# Patient Record
Sex: FEMALE | Race: WHITE | NOT HISPANIC OR LATINO | Employment: FULL TIME | URBAN - METROPOLITAN AREA
[De-identification: names, ages, dates, MRNs, and addresses within clinical notes are randomized per-mention and may not be internally consistent; named-entity substitution may affect disease eponyms.]

---

## 2017-01-02 ENCOUNTER — OFFICE VISIT (OUTPATIENT)
Dept: URGENT CARE | Facility: CLINIC | Age: 32
End: 2017-01-02
Payer: COMMERCIAL

## 2017-01-02 VITALS
BODY MASS INDEX: 24.18 KG/M2 | TEMPERATURE: 97 F | DIASTOLIC BLOOD PRESSURE: 80 MMHG | HEART RATE: 82 BPM | WEIGHT: 128 LBS | OXYGEN SATURATION: 100 % | SYSTOLIC BLOOD PRESSURE: 110 MMHG

## 2017-01-02 DIAGNOSIS — T36.95XA ANTIBIOTIC-INDUCED YEAST INFECTION: ICD-10-CM

## 2017-01-02 DIAGNOSIS — N39.0 URINARY TRACT INFECTION, SITE UNSPECIFIED: Primary | ICD-10-CM

## 2017-01-02 DIAGNOSIS — B37.9 ANTIBIOTIC-INDUCED YEAST INFECTION: ICD-10-CM

## 2017-01-02 DIAGNOSIS — R30.0 DYSURIA: ICD-10-CM

## 2017-01-02 LAB
BILIRUB SERPL-MCNC: NEGATIVE MG/DL
BLOOD URINE, POC: NEGATIVE
COLOR, POC UA: YELLOW
GLUCOSE UR QL STRIP: NORMAL
KETONES UR QL STRIP: NEGATIVE
LEUKOCYTE ESTERASE URINE, POC: ABNORMAL
NITRITE, POC UA: NEGATIVE
PH, POC UA: 7
PROTEIN, POC: ABNORMAL
SPECIFIC GRAVITY, POC UA: 1.01
UROBILINOGEN, POC UA: NORMAL

## 2017-01-02 PROCEDURE — 1159F MED LIST DOCD IN RCRD: CPT | Mod: S$GLB,,, | Performed by: PHYSICIAN ASSISTANT

## 2017-01-02 PROCEDURE — 81002 URINALYSIS NONAUTO W/O SCOPE: CPT | Mod: S$GLB,,, | Performed by: PHYSICIAN ASSISTANT

## 2017-01-02 PROCEDURE — 99999 PR PBB SHADOW E&M-EST. PATIENT-LVL III: CPT | Mod: PBBFAC,,, | Performed by: PHYSICIAN ASSISTANT

## 2017-01-02 PROCEDURE — 99213 OFFICE O/P EST LOW 20 MIN: CPT | Mod: 25,S$GLB,, | Performed by: PHYSICIAN ASSISTANT

## 2017-01-02 RX ORDER — FLUCONAZOLE 150 MG/1
150 TABLET ORAL ONCE
Qty: 2 TABLET | Refills: 0 | Status: SHIPPED | OUTPATIENT
Start: 2017-01-02 | End: 2017-01-02

## 2017-01-02 RX ORDER — PHENAZOPYRIDINE HYDROCHLORIDE 200 MG/1
200 TABLET, FILM COATED ORAL 3 TIMES DAILY PRN
Qty: 6 TABLET | Refills: 0 | Status: SHIPPED | OUTPATIENT
Start: 2017-01-02 | End: 2017-01-31

## 2017-01-02 RX ORDER — NITROFURANTOIN 25; 75 MG/1; MG/1
100 CAPSULE ORAL 2 TIMES DAILY
Qty: 10 CAPSULE | Refills: 0 | Status: SHIPPED | OUTPATIENT
Start: 2017-01-02 | End: 2017-01-07

## 2017-01-02 NOTE — PATIENT INSTRUCTIONS
"  Bladder Infection, Female (Adult)    Urine is normally free from bacteria. But bacteria can get into the urinary tract from the skin around the rectum, or it can travel in the blood from elsewhere in the body. Once it is in your urinary tract, it can cause infection in the urethra (urethritis), the bladder (cystitis), or the kidneys (pyelonephritis).  The most common place for an infection is in the bladder. This is called a bladder infection. This is one of the most common infections in women. Most bladder infections are easily treated. They are not serious unless the infection spreads up to the kidney.  The phrases "bladder infection", "UTI," and "cystitis," are often used to describe the same thing, but they are not always the same. Cystitis is an inflammation of the bladder. The most common cause of cystitis is an infection.  Symptoms  The infection causes inflammation in the urethra and bladder, which causes many of the symptoms. The most common symptoms of a bladder infection are:  · Pain or burning when urinating  · Having to urinate more often than usual  · Urgent need to urinate  · Only a small amount of urine comes out  · Blood in urine  · Abdominal discomfort, usually in the lower abdomen, above the pubic bone  · Cloudy, strong, or bad smelling urine  · Urinary retention, being unable to urinate  · Unable to hold urine in (urinary incontinence)  · Fever  · Loss of appetite  · Confusion (in older adults)  Causes  Bladder infections are not contagious. You can't get one from someone else, from a toilet seat, or from sharing a bath.  The most common cause of bladder infections is bacteria from the bowels. The bacteria get onto the skin around the opening of the urethra. From there, it can get into the urine and travel up to the bladder, causing inflammation and infection. This usually happens because of:  · Wiping improperly after urinating--always wipe from front to back.  · Bowel " incontinence  · Pregnancy  · Procedures such as having a catheter inserted  · Older age  · Not emptying your bladder (stagnated urine gives bacteria a chance to grow)  · Dehydration  · Constipation  · Sex  · Use of a diaphragm for birth control   Treatment  Bladder infections are diagnosed by a urine test. They are treated with antibiotics and usually clear up quickly without complications. Treatment helps prevent a more serious kidney infection.  Medicines  Medicines can help in the treatment of a bladder infection:  · Take antibiotics until they are used up, even if you feel better. It is important to finish them to make sure the infection has cleared.  · You can use acetaminophen or ibuprofen for pain, fever, or discomfort, unless another medicine was prescribed. You can also alternate them, or use both together. They work differently and are a different class of medicines, so taking them together is not an overdose. If you have chronic liver or kidney disease, talk with your healthcare provider before using these medicines. Also talk with your provider if you've ever had a stomach ulcer or gastrointestinal bleeding, or are taking blood-thinner medicines.  · If you are given phenazopydridine to reduce burning with urination, it will cause your urine to become a bright orange color. This can stain clothing.  Care and prevention  These self-care steps can help prevent future infections:  · Drink plenty of fluids to prevent dehydration and flush out of the bladder. Do this unless you must restrict fluids for other health reasons, or your doctor told you not to.  · Proper cleaning after going to the bathroom is important. Wipe from front to back after using the toilet to prevent the spread of bacteria.  · Urinate more often. Don't try to hold urine in for a long time.  · Wear loose-fitting clothes and cotton underwear. Avoid tight-fitting pans.  · Improve your diet and prevent constipation. Eat more fresh fruit and  vegetables, and fiber, and less junk and fatty foods.  · Avoid sex until your symptoms are gone.  · Avoid caffeine, alcohol, and spicy foods. These can irritate the bladder.  · Urinate right after intercourse to flush out the bladder.  · If you use birth control pills and have frequent bladder infections, discuss it with your doctor.  Follow-up care  Call your healthcare provider if all symptoms are not gone after 3 days of treatment. This is especially important if you have repeat infections.  If a culture was done, you will be told if your treatment needs to be changed. If directed, you can call to find out the results.  If X-rays were done, you will be told if the results will affect your treatment.  Call 911  Call emergency services if any of the following occur:  · Trouble breathing  · Difficulty arousing or confusion  · Fainting or loss of consciousness  · Rapid heart rate  When to seek medical advice  Call your healthcare provider right away if any of these occur:  · Fever of 100.4ºF (38.0ºC) or higher, or as directed  · Symptoms are not better by the third day of treatment  · Back or belly (abdominal) pain that gets worse  · Repeated vomiting, or unable to keep medicine down  · Weakness or dizziness  · Vaginal discharge  · Pain, redness, or swelling in the outer vaginal area (labia)  © 5377-0825 The "[employer]. [address] [place] [address], [place] [id number]. All rights reserved. This information is not intended as a substitute for professional medical care. Always follow your healthcare professional's instructions.    Increase fluid intake.  Drink at least 64 ounces of water each day.  Do not hold urine in the bladder.  Empty bladder with first urge to urinate.  Good perineal hygiene.  Avoid wiping from back to front.  Use back up method of birth control while on antibiotic therapy and at least 1 week after completion.  Complete all antibiotics to help prevent recurrence of infection and resistant  bacteria.  Follow up with PCP if no improvement in 3-5 days.

## 2017-01-02 NOTE — PROGRESS NOTES
Subjective:       Patient ID: Julienne Rosa is a 31 y.o. female.    Chief Complaint: Urinary Retention    Urinary Tract Infection    This is a new problem. The current episode started yesterday. The problem has been gradually worsening. There has been no fever. There is no history of pyelonephritis. Associated symptoms include flank pain (mild, R low back), frequency and urgency. Pertinent negatives include no chills, hematuria, nausea, vomiting, bubble bath use or rash. She has tried increased fluids (cranberry juice, ibuprofen) for the symptoms. There is no history of diabetes mellitus, kidney stones or recurrent UTIs. 5yrs since last UTI     Review of Systems   Constitutional: Negative for chills, diaphoresis and fever.   Gastrointestinal: Negative for nausea and vomiting.   Genitourinary: Positive for dysuria, flank pain (mild, R low back), frequency, pelvic pain and urgency. Negative for hematuria and vaginal discharge.   Musculoskeletal: Negative for back pain.   Skin: Negative for rash.   Neurological: Negative for dizziness and headaches.       Objective:      Physical Exam   Constitutional: Vital signs are normal. She appears well-developed and well-nourished.   HENT:   Head: Normocephalic and atraumatic.   Cardiovascular: Normal rate, regular rhythm and normal heart sounds.    Pulmonary/Chest: Effort normal and breath sounds normal. She has no wheezes. She has no rhonchi. She has no rales.   Abdominal: Soft. Bowel sounds are normal. There is no tenderness. There is no CVA tenderness.   Skin: Skin is warm and dry.   Psychiatric: She has a normal mood and affect. Her speech is normal and behavior is normal. Thought content normal.       Assessment:       1. Urinary tract infection, site unspecified    2. Dysuria    3. Antibiotic-induced yeast infection        Plan:   Urinary tract infection, site unspecified  -     POCT urine dipstick without microscope    Dysuria    Antibiotic-induced yeast  infection      Rx: NITROFURANTOIN MONOHYD/M-CRYST 100 mg Oral BID   PHENAZOPYRIDINE  mg Oral TID PRN   FLUCONAZOLE 150 mg Oral Once, Repeat in 3 days if symptoms still persist.     Patient handout on UTI give.  Printed and reviewed AVS.    Patient requests prescription for yeast infection and she frequently gets this after UTI.     Patient instruction given in office:  Increase fluid intake.  Drink at least 64 ounces of water each day.  Do not hold urine in the bladder.  Empty bladder with first urge to urinate.  Good perineal hygiene.  Avoid wiping from back to front.  Use back up method of birth control while on antibiotic therapy and at least 1 week after completion.  Complete all antibiotics to help prevent recurrence of infection and resistant bacteria.  Follow up with PCP if no improvement in 3-5 days.

## 2017-01-02 NOTE — MR AVS SNAPSHOT
[facility] - Urgent [facility]  [address] [place] [address]  Phone: [phone]  Fax: [phone]                  Julienne Rosa   2017 9:00 AM   Office Visit    Description:  Female : 1985   Provider:  Lisa Hernández PA-C   Department:  [facility] - Urgent Care           Reason for Visit     Urinary Retention           Diagnoses this Visit        Comments    Urinary tract infection, site unspecified    -  Primary            To Do List           Goals (5 Years of Data)     None      Follow-Up and Disposition     Return if symptoms worsen or fail to improve.      [facility]     [facility] -  Assistance  Registered nurses in the [facility] provide clinical advisement, health education, appointment booking, and other advisory services.  Call for this free service at [phone].             Medications           Message regarding Medications     Verify the changes and/or additions to your medication regime listed below are the same as discussed with your clinician today.  If any of these changes or additions are incorrect, please notify your healthcare provider.             Verify that the below list of medications is an accurate representation of the medications you are currently taking.  If none reported, the list may be blank. If incorrect, please contact your healthcare provider. Carry this list with you in case of emergency.           Current Medications     escitalopram oxalate (LEXAPRO) 10 MG tablet Take 5 mg by mouth once daily.     fluticasone (FLONASE) 50 mcg/actuation nasal spray 1 spray by Each Nare route once daily.    norethindrone-ethinyl estradiol (JUNEL 1/20, 21,) 1-20 mg-mcg per tablet Take 1 tablet by mouth once daily.    PRENATAL VIT/IRON FUMARATE/FA (RIGHT STEP PRENATAL VITAMINS ORAL) Take by mouth.    triamcinolone acetonide 0.1% (KENALOG) 0.1 % cream Apply topically 2 (two) times daily.           Clinical Reference  "Information           Vital Signs - Last Recorded  Most recent update: 1/2/2017  8:54 AM by Rene Valenzuela MA    BP Pulse Temp    110/80 (BP Location: Right arm, Patient Position: Sitting, BP Method: Manual) 82 97.1 °F (36.2 °C) (Tympanic)    Wt SpO2 BMI    58 kg (127 lb 15.6 oz) 100% 24.18 kg/m2      Blood Pressure          Most Recent Value    BP  110/80      Allergies as of 1/2/2017     Ceclor [Cefaclor]    Augmentin [Amoxicillin-pot Clavulanate]    Sulfa (Sulfonamide Antibiotics)      Immunizations Administered on Date of Encounter - 1/2/2017     None      Orders Placed During Today's Visit      Normal Orders This Visit    POCT urine dipstick without microscope       Instructions      Bladder Infection, Female (Adult)    Urine is normally free from bacteria. But bacteria can get into the urinary tract from the skin around the rectum, or it can travel in the blood from elsewhere in the body. Once it is in your urinary tract, it can cause infection in the urethra (urethritis), the bladder (cystitis), or the kidneys (pyelonephritis).  The most common place for an infection is in the bladder. This is called a bladder infection. This is one of the most common infections in women. Most bladder infections are easily treated. They are not serious unless the infection spreads up to the kidney.  The phrases "bladder infection", "UTI," and "cystitis," are often used to describe the same thing, but they are not always the same. Cystitis is an inflammation of the bladder. The most common cause of cystitis is an infection.  Symptoms  The infection causes inflammation in the urethra and bladder, which causes many of the symptoms. The most common symptoms of a bladder infection are:  · Pain or burning when urinating  · Having to urinate more often than usual  · Urgent need to urinate  · Only a small amount of urine comes out  · Blood in urine  · Abdominal discomfort, usually in the lower abdomen, above the pubic " bone  · Cloudy, strong, or bad smelling urine  · Urinary retention, being unable to urinate  · Unable to hold urine in (urinary incontinence)  · Fever  · Loss of appetite  · Confusion (in older adults)  Causes  Bladder infections are not contagious. You can't get one from someone else, from a toilet seat, or from sharing a bath.  The most common cause of bladder infections is bacteria from the bowels. The bacteria get onto the skin around the opening of the urethra. From there, it can get into the urine and travel up to the bladder, causing inflammation and infection. This usually happens because of:  · Wiping improperly after urinating--always wipe from front to back.  · Bowel incontinence  · Pregnancy  · Procedures such as having a catheter inserted  · Older age  · Not emptying your bladder (stagnated urine gives bacteria a chance to grow)  · Dehydration  · Constipation  · Sex  · Use of a diaphragm for birth control   Treatment  Bladder infections are diagnosed by a urine test. They are treated with antibiotics and usually clear up quickly without complications. Treatment helps prevent a more serious kidney infection.  Medicines  Medicines can help in the treatment of a bladder infection:  · Take antibiotics until they are used up, even if you feel better. It is important to finish them to make sure the infection has cleared.  · You can use acetaminophen or ibuprofen for pain, fever, or discomfort, unless another medicine was prescribed. You can also alternate them, or use both together. They work differently and are a different class of medicines, so taking them together is not an overdose. If you have chronic liver or kidney disease, talk with your healthcare provider before using these medicines. Also talk with your provider if you've ever had a stomach ulcer or gastrointestinal bleeding, or are taking blood-thinner medicines.  · If you are given phenazopydridine to reduce burning with urination, it will cause  your urine to become a bright orange color. This can stain clothing.  Care and prevention  These self-care steps can help prevent future infections:  · Drink plenty of fluids to prevent dehydration and flush out of the bladder. Do this unless you must restrict fluids for other health reasons, or your doctor told you not to.  · Proper cleaning after going to the bathroom is important. Wipe from front to back after using the toilet to prevent the spread of bacteria.  · Urinate more often. Don't try to hold urine in for a long time.  · Wear loose-fitting clothes and cotton underwear. Avoid tight-fitting pans.  · Improve your diet and prevent constipation. Eat more fresh fruit and vegetables, and fiber, and less junk and fatty foods.  · Avoid sex until your symptoms are gone.  · Avoid caffeine, alcohol, and spicy foods. These can irritate the bladder.  · Urinate right after intercourse to flush out the bladder.  · If you use birth control pills and have frequent bladder infections, discuss it with your doctor.  Follow-up care  Call your healthcare provider if all symptoms are not gone after 3 days of treatment. This is especially important if you have repeat infections.  If a culture was done, you will be told if your treatment needs to be changed. If directed, you can call to find out the results.  If X-rays were done, you will be told if the results will affect your treatment.  Call 911  Call emergency services if any of the following occur:  · Trouble breathing  · Difficulty arousing or confusion  · Fainting or loss of consciousness  · Rapid heart rate  When to seek medical advice  Call your healthcare provider right away if any of these occur:  · Fever of 100.4ºF (38.0ºC) or higher, or as directed  · Symptoms are not better by the third day of treatment  · Back or belly (abdominal) pain that gets worse  · Repeated vomiting, or unable to keep medicine down  · Weakness or dizziness  · Vaginal discharge  · Pain,  redness, or swelling in the outer vaginal area (labia)  © 1775-4251 [employer]. [address] [place] [address], [place] [address]. All rights reserved. This information is not intended as a substitute for professional medical care. Always follow your healthcare professional's instructions.    Increase fluid intake.  Drink at least 64 ounces of water each day.  Do not hold urine in the bladder.  Empty bladder with first urge to urinate.  Good perineal hygiene.  Avoid wiping from back to front.  Use back up method of birth control while on antibiotic therapy and at least 1 week after completion.  Complete all antibiotics to help prevent recurrence of infection and resistant bacteria.  Follow up with PCP if no improvement in 3-5 days.

## 2017-01-10 ENCOUNTER — PATIENT MESSAGE (OUTPATIENT)
Dept: URGENT CARE | Facility: CLINIC | Age: 32
End: 2017-01-10

## 2017-01-10 ENCOUNTER — PATIENT MESSAGE (OUTPATIENT)
Dept: OBSTETRICS AND GYNECOLOGY | Facility: CLINIC | Age: 32
End: 2017-01-10

## 2017-01-10 DIAGNOSIS — R30.0 DYSURIA: Primary | ICD-10-CM

## 2017-01-11 ENCOUNTER — LAB VISIT (OUTPATIENT)
Dept: LAB | Facility: HOSPITAL | Age: 32
End: 2017-01-11
Attending: OBSTETRICS & GYNECOLOGY
Payer: COMMERCIAL

## 2017-01-11 DIAGNOSIS — R30.0 DYSURIA: ICD-10-CM

## 2017-01-11 LAB
BILIRUB UR QL STRIP: NEGATIVE
CLARITY UR: CLEAR
COLOR UR: YELLOW
GLUCOSE UR QL STRIP: NEGATIVE
HGB UR QL STRIP: NEGATIVE
KETONES UR QL STRIP: ABNORMAL
LEUKOCYTE ESTERASE UR QL STRIP: NEGATIVE
NITRITE UR QL STRIP: NEGATIVE
PH UR STRIP: 6 [PH] (ref 5–8)
PROT UR QL STRIP: NEGATIVE
SP GR UR STRIP: 1.02 (ref 1–1.03)
URN SPEC COLLECT METH UR: ABNORMAL

## 2017-01-11 PROCEDURE — 81003 URINALYSIS AUTO W/O SCOPE: CPT | Mod: PO

## 2017-01-11 PROCEDURE — 87086 URINE CULTURE/COLONY COUNT: CPT

## 2017-01-12 ENCOUNTER — PATIENT MESSAGE (OUTPATIENT)
Dept: OBSTETRICS AND GYNECOLOGY | Facility: CLINIC | Age: 32
End: 2017-01-12

## 2017-01-12 LAB — BACTERIA UR CULT: NO GROWTH

## 2017-01-31 ENCOUNTER — OFFICE VISIT (OUTPATIENT)
Dept: INTERNAL MEDICINE | Facility: CLINIC | Age: 32
End: 2017-01-31
Payer: COMMERCIAL

## 2017-01-31 ENCOUNTER — LAB VISIT (OUTPATIENT)
Dept: LAB | Facility: HOSPITAL | Age: 32
End: 2017-01-31
Attending: FAMILY MEDICINE
Payer: COMMERCIAL

## 2017-01-31 VITALS
OXYGEN SATURATION: 98 % | TEMPERATURE: 97 F | WEIGHT: 127.19 LBS | HEIGHT: 61 IN | HEART RATE: 76 BPM | DIASTOLIC BLOOD PRESSURE: 78 MMHG | BODY MASS INDEX: 24.01 KG/M2 | SYSTOLIC BLOOD PRESSURE: 106 MMHG

## 2017-01-31 DIAGNOSIS — Z00.00 HEALTHCARE MAINTENANCE: ICD-10-CM

## 2017-01-31 DIAGNOSIS — F41.9 ANXIETY: Primary | ICD-10-CM

## 2017-01-31 LAB
25(OH)D3+25(OH)D2 SERPL-MCNC: 41 NG/ML
ALBUMIN SERPL BCP-MCNC: 3.7 G/DL
ALP SERPL-CCNC: 65 U/L
ALT SERPL W/O P-5'-P-CCNC: 12 U/L
ANION GAP SERPL CALC-SCNC: 7 MMOL/L
AST SERPL-CCNC: 14 U/L
BASOPHILS # BLD AUTO: 0.05 K/UL
BASOPHILS NFR BLD: 0.8 %
BILIRUB SERPL-MCNC: 0.7 MG/DL
BUN SERPL-MCNC: 12 MG/DL
CALCIUM SERPL-MCNC: 8.4 MG/DL
CHLORIDE SERPL-SCNC: 104 MMOL/L
CHOLEST/HDLC SERPL: 3.5 {RATIO}
CO2 SERPL-SCNC: 26 MMOL/L
CREAT SERPL-MCNC: 0.7 MG/DL
DIFFERENTIAL METHOD: NORMAL
EOSINOPHIL # BLD AUTO: 0.2 K/UL
EOSINOPHIL NFR BLD: 2.6 %
ERYTHROCYTE [DISTWIDTH] IN BLOOD BY AUTOMATED COUNT: 12.9 %
EST. GFR  (AFRICAN AMERICAN): >60 ML/MIN/1.73 M^2
EST. GFR  (NON AFRICAN AMERICAN): >60 ML/MIN/1.73 M^2
GLUCOSE SERPL-MCNC: 81 MG/DL
HCT VFR BLD AUTO: 43.4 %
HDL/CHOLESTEROL RATIO: 28.2 %
HDLC SERPL-MCNC: 163 MG/DL
HDLC SERPL-MCNC: 46 MG/DL
HGB BLD-MCNC: 14.2 G/DL
LDLC SERPL CALC-MCNC: 97.6 MG/DL
LYMPHOCYTES # BLD AUTO: 2.2 K/UL
LYMPHOCYTES NFR BLD: 33.6 %
MCH RBC QN AUTO: 29.5 PG
MCHC RBC AUTO-ENTMCNC: 32.7 %
MCV RBC AUTO: 90 FL
MONOCYTES # BLD AUTO: 0.5 K/UL
MONOCYTES NFR BLD: 7.2 %
NEUTROPHILS # BLD AUTO: 3.6 K/UL
NEUTROPHILS NFR BLD: 55.5 %
NONHDLC SERPL-MCNC: 117 MG/DL
PLATELET # BLD AUTO: 316 K/UL
PMV BLD AUTO: 12 FL
POTASSIUM SERPL-SCNC: 4 MMOL/L
PROT SERPL-MCNC: 6.6 G/DL
RBC # BLD AUTO: 4.82 M/UL
SODIUM SERPL-SCNC: 137 MMOL/L
TRIGL SERPL-MCNC: 97 MG/DL
TSH SERPL DL<=0.005 MIU/L-ACNC: 1.55 UIU/ML
WBC # BLD AUTO: 6.51 K/UL

## 2017-01-31 PROCEDURE — 84443 ASSAY THYROID STIM HORMONE: CPT

## 2017-01-31 PROCEDURE — 99213 OFFICE O/P EST LOW 20 MIN: CPT | Mod: S$GLB,,, | Performed by: PHYSICIAN ASSISTANT

## 2017-01-31 PROCEDURE — 1159F MED LIST DOCD IN RCRD: CPT | Mod: S$GLB,,, | Performed by: PHYSICIAN ASSISTANT

## 2017-01-31 PROCEDURE — 80053 COMPREHEN METABOLIC PANEL: CPT

## 2017-01-31 PROCEDURE — 99999 PR PBB SHADOW E&M-EST. PATIENT-LVL III: CPT | Mod: PBBFAC,,, | Performed by: PHYSICIAN ASSISTANT

## 2017-01-31 PROCEDURE — 82306 VITAMIN D 25 HYDROXY: CPT

## 2017-01-31 PROCEDURE — 36415 COLL VENOUS BLD VENIPUNCTURE: CPT | Mod: PO

## 2017-01-31 PROCEDURE — 80061 LIPID PANEL: CPT

## 2017-01-31 PROCEDURE — 85025 COMPLETE CBC W/AUTO DIFF WBC: CPT

## 2017-01-31 RX ORDER — ESCITALOPRAM OXALATE 5 MG/1
5 TABLET ORAL DAILY
Qty: 30 TABLET | Refills: 0 | Status: SHIPPED | OUTPATIENT
Start: 2017-01-31 | End: 2017-12-07

## 2017-01-31 NOTE — MR AVS SNAPSHOT
[facility]  [address]  [place] [address]  Phone: [phone]  Fax: [phone]                  Julienne Rosa   2017 9:00 AM   Office Visit    Description:  Female : 1985   Provider:  Lia Sutton PA-C   Department:  [facility] - Internal Medicine           Reason for Visit     Annual Exam           Diagnoses this Visit        Comments    Anxiety    -  Primary     Healthcare maintenance                To Do List           Future Appointments        Provider Department Dept Phone    2017 12:45 PM LABORATORY, [facility] [phone]      Goals (5 Years of Data)     None      Follow-Up and Disposition     Return if symptoms worsen or fail to improve.       These Medications        Disp Refills Start End    escitalopram oxalate (LEXAPRO) 5 MG Tab 30 tablet 0 2017 3/2/2017    Take 1 tablet (5 mg total) by mouth once daily. - Oral    Pharmacy: [id number] - [place] - [address].  #: [phone]         [facility]     [facility] Nurse Care [facility] -  Assistance  Registered nurses in the [facility] provide clinical advisement, health education, appointment booking, and other advisory services.  Call for this free service at [phone].             Medications           Message regarding Medications     Verify the changes and/or additions to your medication regime listed below are the same as discussed with your clinician today.  If any of these changes or additions are incorrect, please notify your healthcare provider.        START taking these NEW medications        Refills    escitalopram oxalate (LEXAPRO) 5 MG Tab 0    Sig: Take 1 tablet (5 mg total) by mouth once daily.    Class: Normal    Route: Oral      STOP taking these medications     phenazopyridine (PYRIDIUM) 200 MG tablet Take 1 tablet (200 mg total) by mouth 3 (three) times daily as needed.    PRENATAL  "VIT/IRON FUMARATE/FA (RIGHT STEP PRENATAL VITAMINS ORAL) Take by mouth.           Verify that the below list of medications is an accurate representation of the medications you are currently taking.  If none reported, the list may be blank. If incorrect, please contact your healthcare provider. Carry this list with you in case of emergency.           Current Medications     fluticasone (FLONASE) 50 mcg/actuation nasal spray 1 spray by Each Nare route once daily.    norethindrone-ethinyl estradiol (JUNEL 1/20, 21,) 1-20 mg-mcg per tablet Take 1 tablet by mouth once daily.    escitalopram oxalate (LEXAPRO) 5 MG Tab Take 1 tablet (5 mg total) by mouth once daily.    triamcinolone acetonide 0.1% (KENALOG) 0.1 % cream Apply topically 2 (two) times daily.           Clinical Reference Information           Vital Signs - Last Recorded  Most recent update: 1/31/2017  9:02 AM by Padmini Renee LPN    BP Pulse Temp Ht Wt    106/78 (BP Location: Right arm, Patient Position: Sitting, BP Method: Manual) 76 96.9 °F (36.1 °C) (Tympanic) 5' 1" (1.549 m) 57.7 kg (127 lb 3.3 oz)    LMP SpO2 Breastfeeding? BMI    01/24/2017 98% No 24.04 kg/m2      Blood Pressure          Most Recent Value    BP  106/78      Allergies as of 1/31/2017     Ceclor [Cefaclor]    Augmentin [Amoxicillin-pot Clavulanate]    Sulfa (Sulfonamide Antibiotics)      Immunizations Administered on Date of Encounter - 1/31/2017     None      Orders Placed During Today's Visit     Future Labs/Procedures Expected by Expires    CBC auto differential  1/31/2017 1/31/2018    Lipid panel  1/31/2017 1/31/2018    Vitamin D  1/31/2017 1/31/2018    Comprehensive metabolic panel  As directed 1/31/2018    TSH  As directed 4/1/2018      "

## 2017-01-31 NOTE — PROGRESS NOTES
Subjective:      Patient ID: Julienne Rosa is a 31 y.o. female.    Chief Complaint: Annual Exam    Interested in getting off lexapro.  Anxiety improved. Tried to discontinue, but withdrawal symptoms were bad (headache, nausea, cintron). Currently taking half of a 10mg tablet.   Anxiety   Presents for follow-up visit. Patient reports no chest pain, compulsions, confusion, decreased concentration, depressed mood, dizziness, dry mouth, excessive worry, feeling of choking, hyperventilation, impotence, insomnia, irritability, malaise, muscle tension, nausea, nervous/anxious behavior, obsessions, palpitations, panic, restlessness, shortness of breath or suicidal ideas. Primary symptoms comment: resolved. Symptoms occur rarely. The severity of symptoms is mild. The quality of sleep is good. Nighttime awakenings: none.       No other issues to discuss today.  Exercises regularly. Diet is healthy.    Health Maintenance Due   Topic Date Due    Lipid Panel  1985    Influenza Vaccine  08/01/2016       Past Medical History   Diagnosis Date    Abnormal Pap smear of vagina      x 2-had 2 normal colposcopies//paps have been normal since    Mental disorder      depression and anxiety       Review of Systems   Constitutional: Negative for activity change, appetite change, chills, diaphoresis, fatigue, fever, irritability and unexpected weight change.   HENT: Negative.  Negative for congestion, hearing loss, postnasal drip, rhinorrhea, sore throat, trouble swallowing and voice change.    Eyes: Negative.  Negative for visual disturbance.   Respiratory: Negative.  Negative for cough, choking, chest tightness and shortness of breath.    Cardiovascular: Negative for chest pain, palpitations and leg swelling.   Gastrointestinal: Negative for abdominal distention, abdominal pain, blood in stool, constipation, diarrhea, nausea and vomiting.   Endocrine: Negative for cold intolerance, heat intolerance, polydipsia and  "polyuria.   Genitourinary: Negative.  Negative for difficulty urinating, frequency and impotence.   Musculoskeletal: Negative for arthralgias, back pain, gait problem, joint swelling and myalgias.   Skin: Negative for color change, pallor, rash and wound.   Neurological: Negative for dizziness, tremors, weakness, light-headedness, numbness and headaches.   Hematological: Negative for adenopathy.   Psychiatric/Behavioral: Negative for behavioral problems, confusion, decreased concentration, self-injury, sleep disturbance and suicidal ideas. The patient is not nervous/anxious and does not have insomnia.      Objective:     Visit Vitals    /78 (BP Location: Right arm, Patient Position: Sitting, BP Method: Manual)    Pulse 76    Temp 96.9 °F (36.1 °C) (Tympanic)    Ht 5' 1" (1.549 m)    Wt 57.7 kg (127 lb 3.3 oz)    LMP 01/24/2017    SpO2 98%    Breastfeeding No    BMI 24.04 kg/m2       Physical Exam   Constitutional: She is oriented to person, place, and time. She appears well-developed and well-nourished. She is cooperative.   HENT:   Head: Normocephalic and atraumatic.   Right Ear: External ear normal.   Left Ear: External ear normal.   Nose: Nose normal.   Mouth/Throat: Oropharynx is clear and moist.   Eyes: Conjunctivae and EOM are normal. Pupils are equal, round, and reactive to light. Right eye exhibits no discharge. Left eye exhibits no discharge. No scleral icterus.   Neck: Normal range of motion. Neck supple. Normal carotid pulses and no JVD present. Carotid bruit is not present. No edema present. No thyromegaly present.   Cardiovascular: Normal rate, regular rhythm, normal heart sounds and intact distal pulses.  Exam reveals no gallop and no friction rub.    No murmur heard.  Pulmonary/Chest: Effort normal and breath sounds normal. No accessory muscle usage. No respiratory distress. She has no wheezes. She has no rales. She exhibits no tenderness.   Abdominal: Soft. Bowel sounds are normal. She " exhibits no distension and no mass. There is no tenderness. There is no rebound and no guarding.   Musculoskeletal: Normal range of motion. She exhibits no edema, tenderness or deformity.   Lymphadenopathy:     She has no cervical adenopathy.   Neurological: She is alert and oriented to person, place, and time. She has normal reflexes. She displays normal reflexes. No cranial nerve deficit or sensory deficit. She exhibits normal muscle tone. Coordination normal.   Skin: Skin is warm, dry and intact. No rash noted. No cyanosis or erythema. No pallor. Nails show no clubbing.   Psychiatric: She has a normal mood and affect. Her behavior is normal. Judgment and thought content normal.   Nursing note and vitals reviewed.      Assessment:     1. Anxiety    2. Healthcare maintenance      Plan:   Anxiety  -     escitalopram oxalate (LEXAPRO) 5 MG Tab; Take 1 tablet (5 mg total) by mouth once daily.  Dispense: 30 tablet; Refill: 0  -take 1/2 tablet daily for 2 weeks, then take 1/2 tablet every other day for 1 week, then take 1/2 tablet twice weekly for 1 week. Contact office if having any issues with this.     Healthcare maintenance  -     CBC auto differential; Future; Expected date: 1/31/17  -     Comprehensive metabolic panel; Future  -     Lipid panel; Future; Expected date: 1/31/17  -     TSH; Future  -     Vitamin D; Future; Expected date: 1/31/17  -continue healthy lifestyle.    Return if symptoms worsen or fail to improve.

## 2017-03-20 ENCOUNTER — OFFICE VISIT (OUTPATIENT)
Dept: INTERNAL MEDICINE | Facility: CLINIC | Age: 32
End: 2017-03-20
Payer: COMMERCIAL

## 2017-03-20 ENCOUNTER — LAB VISIT (OUTPATIENT)
Dept: LAB | Facility: HOSPITAL | Age: 32
End: 2017-03-20
Attending: INTERNAL MEDICINE
Payer: COMMERCIAL

## 2017-03-20 VITALS
SYSTOLIC BLOOD PRESSURE: 106 MMHG | HEART RATE: 99 BPM | TEMPERATURE: 98 F | HEIGHT: 61 IN | DIASTOLIC BLOOD PRESSURE: 64 MMHG | BODY MASS INDEX: 24.44 KG/M2 | OXYGEN SATURATION: 97 % | WEIGHT: 129.44 LBS

## 2017-03-20 DIAGNOSIS — J02.9 ACUTE PHARYNGITIS, UNSPECIFIED ETIOLOGY: Primary | ICD-10-CM

## 2017-03-20 DIAGNOSIS — J02.9 ACUTE PHARYNGITIS, UNSPECIFIED ETIOLOGY: ICD-10-CM

## 2017-03-20 LAB
DEPRECATED S PYO AG THROAT QL EIA: NEGATIVE
HETEROPH AB SERPL QL IA: NEGATIVE

## 2017-03-20 PROCEDURE — 1160F RVW MEDS BY RX/DR IN RCRD: CPT | Mod: S$GLB,,, | Performed by: PHYSICIAN ASSISTANT

## 2017-03-20 PROCEDURE — 99213 OFFICE O/P EST LOW 20 MIN: CPT | Mod: 25,S$GLB,, | Performed by: PHYSICIAN ASSISTANT

## 2017-03-20 PROCEDURE — 99999 PR PBB SHADOW E&M-EST. PATIENT-LVL III: CPT | Mod: PBBFAC,,, | Performed by: PHYSICIAN ASSISTANT

## 2017-03-20 PROCEDURE — 87081 CULTURE SCREEN ONLY: CPT

## 2017-03-20 PROCEDURE — 86308 HETEROPHILE ANTIBODY SCREEN: CPT | Mod: PO

## 2017-03-20 PROCEDURE — 87880 STREP A ASSAY W/OPTIC: CPT | Mod: PO

## 2017-03-20 PROCEDURE — 36415 COLL VENOUS BLD VENIPUNCTURE: CPT | Mod: PO

## 2017-03-20 PROCEDURE — 96372 THER/PROPH/DIAG INJ SC/IM: CPT | Mod: S$GLB,,, | Performed by: INTERNAL MEDICINE

## 2017-03-20 RX ORDER — METHYLPREDNISOLONE ACETATE 40 MG/ML
60 INJECTION, SUSPENSION INTRA-ARTICULAR; INTRALESIONAL; INTRAMUSCULAR; SOFT TISSUE
Status: COMPLETED | OUTPATIENT
Start: 2017-03-20 | End: 2017-03-20

## 2017-03-20 RX ADMIN — METHYLPREDNISOLONE ACETATE 60 MG: 40 INJECTION, SUSPENSION INTRA-ARTICULAR; INTRALESIONAL; INTRAMUSCULAR; SOFT TISSUE at 10:03

## 2017-03-20 NOTE — PROGRESS NOTES
Subjective:       Patient ID: Julienne Rosa is a 31 y.o. female.    Chief Complaint: Sore Throat    HPI Comments: 31 year old female c/o mild myalgias, B ear pain, sore throat, nasal congestion, and clear rhinorrhea X 1-2 days. She reports no cough, fever, chills, CP, SOB, N/V, rash, or other medical complaints. She has taken OTC cold medication and Advil with temporary improvement of sxs.    Past Medical History:  No date: Abnormal Pap smear of vagina      Comment: x 2-had 2 normal colposcopies//paps have been                normal since  No date: Mental disorder      Comment: depression and anxiety        Sore Throat    Associated symptoms include congestion and ear pain. Pertinent negatives include no coughing, headaches, shortness of breath, trouble swallowing or vomiting.     Review of Systems   Constitutional: Negative for chills and fever.   HENT: Positive for congestion, ear pain, rhinorrhea and sore throat. Negative for trouble swallowing.    Respiratory: Negative for cough and shortness of breath.    Cardiovascular: Negative for chest pain, palpitations and leg swelling.   Gastrointestinal: Negative for nausea and vomiting.   Skin: Negative for rash.   Neurological: Negative for dizziness, weakness, numbness and headaches.   Psychiatric/Behavioral: Negative for confusion.       Objective:      Physical Exam   Constitutional: She is oriented to person, place, and time. She appears well-developed and well-nourished. No distress.   HENT:   Head: Normocephalic and atraumatic.   Right Ear: Tympanic membrane and ear canal normal.   Left Ear: Tympanic membrane and ear canal normal.   Mouth/Throat: Uvula is midline. No oropharyngeal exudate.   Moderate erythema and minimal swelling to soft palate; no airway compromise   Eyes: EOM are normal. No scleral icterus.   Neck: Neck supple.   Cardiovascular: Normal rate and regular rhythm.    Pulmonary/Chest: Effort normal and breath sounds normal. No respiratory  distress. She has no decreased breath sounds. She has no wheezes. She has no rhonchi. She has no rales.   Musculoskeletal: Normal range of motion. She exhibits no edema.   Lymphadenopathy:     She has no cervical adenopathy.   Neurological: She is alert and oriented to person, place, and time. No cranial nerve deficit.   Skin: Skin is warm and dry. No rash noted.   Psychiatric: She has a normal mood and affect. Her speech is normal and behavior is normal. Thought content normal.       Assessment:       1. Acute pharyngitis, unspecified etiology        Plan:         1. Depo-Medrol 60mg IM today. Rapid strep test and monospot today with review following.  2. Symptomatic care. Hydration and rest. Monitor for new or worsening symptoms. Further recommendations to follow review of pending results.  3. F/u with ENT if sxs persist. ER if sxs worsen.

## 2017-03-20 NOTE — MR AVS SNAPSHOT
[facility]  [address] [facility] [address] [place] [address]  Phone: [phone]  Fax: [phone]                  Julienne Rosa   3/20/2017 9:20 AM   Office Visit    Description:  Female : 1985   Provider:  ROMERO Salinas   Department:  [facility] - [facility]           Diagnoses this Visit        Comments    Acute pharyngitis, unspecified etiology    -  Primary            To Do List           Future Appointments        Provider Department Dept Phone    3/20/2017 1:50 PM LABORATORY, [facility] [phone]      Goals (5 Years of Data)     None      [facility]     [facility] On Call Nurse Care Line -  Assistance  Registered nurses in the [facility] Center provide clinical advisement, health education, appointment booking, and other advisory services.  Call for this free service at [phone].             Medications           Message regarding Medications     Verify the changes and/or additions to your medication regime listed below are the same as discussed with your clinician today.  If any of these changes or additions are incorrect, please notify your healthcare provider.        These medications were administered today        Dose Freq    methylPREDNISolone acetate injection 60 mg 60 mg Clinic/HOD 1 time    Sig: Inject 1.5 mLs (60 mg total) into the muscle one time.    Class: Normal    Route: Intramuscular           Verify that the below list of medications is an accurate representation of the medications you are currently taking.  If none reported, the list may be blank. If incorrect, please contact your healthcare provider. Carry this list with you in case of emergency.           Current Medications     fluticasone (FLONASE) 50 mcg/actuation nasal spray 1 spray by Each Nare route once daily.    norethindrone-ethinyl estradiol (JUNEL 1/20, ,) 1-20 mg-mcg per tablet Take 1 tablet by mouth once daily.    escitalopram oxalate (LEXAPRO)  "5 MG Tab Take 1 tablet (5 mg total) by mouth once daily.    triamcinolone acetonide 0.1% (KENALOG) 0.1 % cream Apply topically 2 (two) times daily.           Clinical Reference Information           Your Vitals Were     BP Pulse Temp Height    106/64 (BP Location: Right arm, Patient Position: Sitting, BP Method: Manual) 99 97.6 °F (36.4 °C) (Tympanic) 5' 1" (1.549 m)    Weight Last Period SpO2 BMI    58.7 kg (129 lb 6.6 oz) 03/06/2017 97% 24.45 kg/m2      Blood Pressure          Most Recent Value    BP  106/64      Allergies as of 3/20/2017     Ceclor [Cefaclor]    Augmentin [Amoxicillin-pot Clavulanate]    Sulfa (Sulfonamide Antibiotics)      Immunizations Administered on Date of Encounter - 3/20/2017     None      Orders Placed During Today's Visit      Normal Orders This Visit    Throat Screen, Rapid     Future Labs/Procedures Expected by Expires    HETEROPHILE AB SCREEN  3/20/2017 5/19/2018      Language Assistance Services     ATTENTION: Language assistance services are available, free of charge. Please call [phone].      ATENCIÓN: Si habla reece, tiene a nunez disposición servicios gratuitos de asistencia lingüística. Llame al [phone].     CHANTE Ý: N?u b?n nói Ti?ng Vi?t, có các d?ch v? h? tr? ngôn ng? mi?n phí dành cho b?n. G?i s? [phone].         [facility] complies with applicable Federal civil rights laws and does not discriminate on the basis of race, color, national origin, age, disability, or sex.        "

## 2017-03-21 ENCOUNTER — PATIENT MESSAGE (OUTPATIENT)
Dept: INTERNAL MEDICINE | Facility: CLINIC | Age: 32
End: 2017-03-21

## 2017-03-21 RX ORDER — AZITHROMYCIN 250 MG/1
TABLET, FILM COATED ORAL
Qty: 6 TABLET | Refills: 0 | Status: SHIPPED | OUTPATIENT
Start: 2017-03-21 | End: 2017-03-26

## 2017-03-23 LAB — BACTERIA THROAT CULT: NORMAL

## 2017-05-04 ENCOUNTER — PATIENT MESSAGE (OUTPATIENT)
Dept: INTERNAL MEDICINE | Facility: CLINIC | Age: 32
End: 2017-05-04

## 2017-11-06 DIAGNOSIS — Z30.011 ENCOUNTER FOR INITIAL PRESCRIPTION OF CONTRACEPTIVE PILLS: ICD-10-CM

## 2017-11-06 RX ORDER — NORETHINDRONE ACETATE AND ETHINYL ESTRADIOL .02; 1 MG/1; MG/1
1 TABLET ORAL DAILY
Qty: 21 TABLET | Refills: 2 | Status: SHIPPED | OUTPATIENT
Start: 2017-11-06 | End: 2017-12-19 | Stop reason: SDUPTHER

## 2017-11-23 ENCOUNTER — PATIENT MESSAGE (OUTPATIENT)
Dept: OBSTETRICS AND GYNECOLOGY | Facility: CLINIC | Age: 32
End: 2017-11-23

## 2017-12-07 ENCOUNTER — OFFICE VISIT (OUTPATIENT)
Dept: OBSTETRICS AND GYNECOLOGY | Facility: CLINIC | Age: 32
End: 2017-12-07
Payer: COMMERCIAL

## 2017-12-07 VITALS
SYSTOLIC BLOOD PRESSURE: 116 MMHG | HEIGHT: 61 IN | BODY MASS INDEX: 24.77 KG/M2 | WEIGHT: 131.19 LBS | DIASTOLIC BLOOD PRESSURE: 76 MMHG

## 2017-12-07 DIAGNOSIS — N76.3 CHRONIC VULVITIS: Primary | ICD-10-CM

## 2017-12-07 PROCEDURE — 87529 HSV DNA AMP PROBE: CPT

## 2017-12-07 PROCEDURE — 99999 PR PBB SHADOW E&M-EST. PATIENT-LVL II: CPT | Mod: PBBFAC,,, | Performed by: OBSTETRICS & GYNECOLOGY

## 2017-12-07 PROCEDURE — 87210 SMEAR WET MOUNT SALINE/INK: CPT | Mod: QW,S$GLB,, | Performed by: OBSTETRICS & GYNECOLOGY

## 2017-12-07 PROCEDURE — 99213 OFFICE O/P EST LOW 20 MIN: CPT | Mod: 25,S$GLB,, | Performed by: OBSTETRICS & GYNECOLOGY

## 2017-12-07 RX ORDER — FLUCONAZOLE 150 MG/1
150 TABLET ORAL
Qty: 2 TABLET | Refills: 0 | Status: SHIPPED | OUTPATIENT
Start: 2017-12-07 | End: 2017-12-11

## 2017-12-07 RX ORDER — HYDROXYZINE PAMOATE 25 MG/1
25 CAPSULE ORAL EVERY 6 HOURS PRN
Qty: 30 CAPSULE | Refills: 0 | Status: SHIPPED | OUTPATIENT
Start: 2017-12-07 | End: 2018-02-28

## 2017-12-07 RX ORDER — CLOBETASOL PROPIONATE 0.5 MG/G
OINTMENT TOPICAL 2 TIMES DAILY
Qty: 60 G | Refills: 0 | Status: SHIPPED | OUTPATIENT
Start: 2017-12-07 | End: 2018-02-28

## 2017-12-07 NOTE — PROGRESS NOTES
Subjective:       Patient ID: Julienne Rosa is a 32 y.o. female.    Chief Complaint:  Vaginal Itching      History of Present Illness  HPI  Presents with vulvar itching.  Symptoms have been coming and going x several months, but never seem to completely resolve.  Kenalog cream slightly helps.  No vaginal discharge, vaginal itching, or vaginal odor.  She is mutually monogomous with her .  Patient washes the genital area with water only, and uses unscented laundry detergent.  Sex is very irritating to the vulvar skin.    GYN & OB History  Patient's last menstrual period was 2017.   Date of Last Pap: 2016    OB History    Para Term  AB Living   1 1 1     1   SAB TAB Ectopic Multiple Live Births         0 1      # Outcome Date GA Lbr Will/2nd Weight Sex Delivery Anes PTL Lv   1 Term 11/24/15 39w3d / 01:59 3.29 kg (7 lb 4.1 oz) M Vag-Spont EPI N PEYTON      Complications: Shoulder Dystocia          Review of Systems  Review of Systems   Constitutional: Negative for fatigue, fever and unexpected weight change.   Gastrointestinal: Negative for abdominal pain, constipation, diarrhea, nausea and vomiting.   Genitourinary: Positive for genital sores (see HPI). Negative for dysuria, frequency, urgency, vaginal bleeding, vaginal discharge, vaginal pain, postcoital bleeding and vaginal odor.           Objective:    Physical Exam:   Constitutional: She is oriented to person, place, and time. She appears well-developed and well-nourished. No distress.             Abdominal: Soft. She exhibits no distension and no mass. There is no tenderness. There is no rebound and no guarding. Hernia confirmed negative in the right inguinal area and confirmed negative in the left inguinal area.     Genitourinary: Vagina normal. Pelvic exam was performed with patient supine. There is rash on the right labia. There is no tenderness, lesion or injury on the right labia. There is rash on the left labia. There is  no tenderness, lesion or injury on the left labia. Uterus is not deviated, not enlarged, not fixed, not tender and not experiencing uterine prolapse. Right adnexum displays no mass, no tenderness and no fullness. Left adnexum displays no mass, no tenderness and no fullness. No erythema, tenderness, bleeding, rectocele, cystocele or unspecified prolapse of vaginal walls in the vagina. No foreign body in the vagina. No signs of injury around the vagina. No vaginal discharge found. Cervix exhibits no motion tenderness, no discharge and no friability.   Genitourinary Comments: Erythema with mild swelling of the bilateral labia majora, minora, and perineal body.  Has superficial maceration versus exoriation of the skin of the right labia minora.  Swab for HSV taken of this area, but findings not typical for HSV        Uterus Size: 6 cm       Neurological: She is alert and oriented to person, place, and time.     Psychiatric: She has a normal mood and affect.        Wet prep: normal vaginal squamous epithelial cells    Assessment:        1. Chronic vulvitis                Plan:      Julienne was seen today for vaginal itching.    Diagnoses and all orders for this visit:    Chronic vulvitis  -     fluconazole (DIFLUCAN) 150 MG Tab; Take 1 tablet (150 mg total) by mouth every 72 hours.  -     clobetasol 0.05% (TEMOVATE) 0.05 % Oint; Apply topically 2 (two) times daily.  -     hydrOXYzine pamoate (VISTARIL) 25 MG Cap; Take 1 capsule (25 mg total) by mouth every 6 (six) hours as needed (itching).  -     HSV by Rapid PCR, Non-Blood [facility]; Vagina    Keep appt with me in 2 weeks for annual. Will f/u symptoms and results at that visit.

## 2017-12-09 LAB
HSV1 DNA SPEC QL NAA+PROBE: NEGATIVE
HSV2 DNA SPEC QL NAA+PROBE: NEGATIVE
SPECIMEN SOURCE: NORMAL

## 2017-12-19 ENCOUNTER — OFFICE VISIT (OUTPATIENT)
Dept: OBSTETRICS AND GYNECOLOGY | Facility: CLINIC | Age: 32
End: 2017-12-19
Payer: COMMERCIAL

## 2017-12-19 VITALS
BODY MASS INDEX: 23.98 KG/M2 | HEIGHT: 61 IN | SYSTOLIC BLOOD PRESSURE: 106 MMHG | DIASTOLIC BLOOD PRESSURE: 76 MMHG | WEIGHT: 127 LBS

## 2017-12-19 DIAGNOSIS — N76.3 CHRONIC VULVITIS: ICD-10-CM

## 2017-12-19 DIAGNOSIS — Z30.011 ENCOUNTER FOR INITIAL PRESCRIPTION OF CONTRACEPTIVE PILLS: ICD-10-CM

## 2017-12-19 DIAGNOSIS — Z01.419 WELL WOMAN EXAM WITH ROUTINE GYNECOLOGICAL EXAM: Primary | ICD-10-CM

## 2017-12-19 PROCEDURE — 99395 PREV VISIT EST AGE 18-39: CPT | Mod: S$GLB,,, | Performed by: OBSTETRICS & GYNECOLOGY

## 2017-12-19 PROCEDURE — 99999 PR PBB SHADOW E&M-EST. PATIENT-LVL II: CPT | Mod: PBBFAC,,, | Performed by: OBSTETRICS & GYNECOLOGY

## 2017-12-19 RX ORDER — NORETHINDRONE ACETATE AND ETHINYL ESTRADIOL .02; 1 MG/1; MG/1
1 TABLET ORAL DAILY
Qty: 84 TABLET | Refills: 3 | Status: SHIPPED | OUTPATIENT
Start: 2017-12-19 | End: 2018-04-20 | Stop reason: SDUPTHER

## 2017-12-19 NOTE — PROGRESS NOTES
Subjective:       Patient ID: Julienne Rosa is a 32 y.o. female.    Chief Complaint:  Gynecologic Exam      History of Present Illness  HPI  Presents for a well-woman exam.  Pt saw me last week for chronic vulvar itching.  Symptoms completely resolved with diflucan and topical clobetasol ointment.  HSV PCR was negative.  Pt needs OCP refill, but wants to consider other contraception options because of low libido on the pill.   Last pap: 2015: normal    GYN & OB History  Patient's last menstrual period was 2017.   Date of Last Pap: 2016    OB History    Para Term  AB Living   1 1 1     1   SAB TAB Ectopic Multiple Live Births         0 1      # Outcome Date GA Lbr Will/2nd Weight Sex Delivery Anes PTL Lv   1 Term 11/24/15 39w3d / 01:59 3.29 kg (7 lb 4.1 oz) M Vag-Spont EPI N PEYTON      Complications: Shoulder Dystocia          Review of Systems  Review of Systems   Constitutional: Negative for activity change, fatigue, fever and unexpected weight change.   Gastrointestinal: Negative for abdominal pain, bloating, constipation, diarrhea, nausea and vomiting.   Endocrine: Negative for hair loss and hot flashes.   Genitourinary: Positive for decreased libido. Negative for dyspareunia, dysuria, frequency, genital sores, hematuria, menorrhagia, menstrual problem, pelvic pain, urgency, vaginal bleeding, vaginal discharge, vaginal pain, dysmenorrhea, postcoital bleeding and vaginal odor.   Skin:  Negative for rash and hair changes.   Hematological: Negative for adenopathy.   Breast: Negative for breast mass, breast pain, nipple discharge and skin changes          Objective:    Physical Exam:   Constitutional: She is oriented to person, place, and time. She appears well-developed and well-nourished. No distress.      Neck: Neck supple. No thyromegaly present.     Pulmonary/Chest: Right breast exhibits no inverted nipple, no mass, no nipple discharge, no skin change, no tenderness, no bleeding  and no swelling. Left breast exhibits inverted nipple (has been inverted her entire adult life). Left breast exhibits no mass, no nipple discharge, no skin change, no tenderness, no bleeding and no swelling. Breasts are symmetrical.        Abdominal: Soft. She exhibits no distension and no mass. There is no tenderness. There is no rebound and no guarding.     Genitourinary: Pelvic exam was performed with patient supine. There is no rash, tenderness, lesion or injury on the right labia. There is no rash, tenderness, lesion or injury on the left labia. Uterus is not deviated, not enlarged, not fixed, not tender, not hosting fibroids and not experiencing uterine prolapse. Cervix is normal. Right adnexum displays no mass, no tenderness and no fullness. Left adnexum displays no mass, no tenderness and no fullness. No erythema, tenderness, bleeding, rectocele or cystocele in the vagina. No foreign body in the vagina. No signs of injury around the vagina. No vaginal discharge found. Cervix exhibits no motion tenderness, no discharge and no friability.        Uterus Size: 6 cm      Lymphadenopathy:        Right: No inguinal adenopathy present.        Left: No inguinal adenopathy present.    Neurological: She is alert and oriented to person, place, and time.     Psychiatric: She has a normal mood and affect.          Assessment:        1. Well woman exam with routine gynecological exam    2. Chronic vulvitis    3. Encounter for initial prescription of contraceptive pills                Plan:      Julienne was seen today for gynecologic exam.    Diagnoses and all orders for this visit:    Well woman exam with routine gynecological exam    Chronic vulvitis    Encounter for initial prescription of contraceptive pills  -     norethindrone-ethinyl estradiol (MICROGESTIN 1/20) 1-20 mg-mcg per tablet; Take 1 tablet by mouth once daily.    Vulvitis has completely resolved.  Okay to wean and stop clobetasol ointment.  Discussed  contraception options with patient including pills, patch, ring, Depo Provera, Implanon, Mirena, and Paraguard.  Pt strongly considering tanika IUD.  Pamphlet given and order form signed.  Wants to talk to her  about it, and will let us know if she wants us to order it.  RTC 1 year or sooner prn.

## 2017-12-20 ENCOUNTER — OFFICE VISIT (OUTPATIENT)
Dept: INTERNAL MEDICINE | Facility: CLINIC | Age: 32
End: 2017-12-20
Payer: COMMERCIAL

## 2017-12-20 VITALS
DIASTOLIC BLOOD PRESSURE: 80 MMHG | OXYGEN SATURATION: 99 % | TEMPERATURE: 97 F | SYSTOLIC BLOOD PRESSURE: 112 MMHG | BODY MASS INDEX: 24.39 KG/M2 | HEART RATE: 96 BPM | WEIGHT: 129.19 LBS | HEIGHT: 61 IN

## 2017-12-20 DIAGNOSIS — J01.90 ACUTE SINUSITIS, RECURRENCE NOT SPECIFIED, UNSPECIFIED LOCATION: Primary | ICD-10-CM

## 2017-12-20 PROCEDURE — 96372 THER/PROPH/DIAG INJ SC/IM: CPT | Mod: S$GLB,,, | Performed by: FAMILY MEDICINE

## 2017-12-20 PROCEDURE — 99999 PR PBB SHADOW E&M-EST. PATIENT-LVL IV: CPT | Mod: PBBFAC,,, | Performed by: PHYSICIAN ASSISTANT

## 2017-12-20 PROCEDURE — 99213 OFFICE O/P EST LOW 20 MIN: CPT | Mod: 25,S$GLB,, | Performed by: PHYSICIAN ASSISTANT

## 2017-12-20 RX ORDER — DOXYCYCLINE 100 MG/1
100 CAPSULE ORAL 2 TIMES DAILY
Qty: 20 CAPSULE | Refills: 0 | Status: SHIPPED | OUTPATIENT
Start: 2017-12-20 | End: 2017-12-30

## 2017-12-20 RX ORDER — BETAMETHASONE SODIUM PHOSPHATE AND BETAMETHASONE ACETATE 3; 3 MG/ML; MG/ML
6 INJECTION, SUSPENSION INTRA-ARTICULAR; INTRALESIONAL; INTRAMUSCULAR; SOFT TISSUE
Status: COMPLETED | OUTPATIENT
Start: 2017-12-20 | End: 2017-12-20

## 2017-12-20 RX ADMIN — BETAMETHASONE SODIUM PHOSPHATE AND BETAMETHASONE ACETATE 6 MG: 3; 3 INJECTION, SUSPENSION INTRA-ARTICULAR; INTRALESIONAL; INTRAMUSCULAR; SOFT TISSUE at 08:12

## 2017-12-20 NOTE — PROGRESS NOTES
Subjective:      Patient ID: Julienne Rosa is a 32 y.o. female.    Chief Complaint: Sinus Problem (nasal congestion/pain ) and Cough    Sinus Problem   This is a new problem. The current episode started in the past 7 days. The problem has been gradually worsening since onset. There has been no fever. Associated symptoms include congestion, coughing, sneezing and a sore throat. Pertinent negatives include no chills, diaphoresis, ear pain, headaches, hoarse voice, neck pain, shortness of breath, sinus pressure or swollen glands. Treatments tried: delsym, tylenol, aleve. The treatment provided mild relief.       Review of Systems   Constitutional: Negative for activity change, appetite change, chills, diaphoresis, fatigue, fever and unexpected weight change.   HENT: Positive for congestion, postnasal drip, rhinorrhea, sneezing and sore throat. Negative for ear pain, hearing loss, hoarse voice, sinus pressure, trouble swallowing and voice change.    Eyes: Negative.  Negative for visual disturbance.   Respiratory: Positive for cough. Negative for choking, chest tightness, shortness of breath and wheezing.    Cardiovascular: Negative for chest pain, palpitations and leg swelling.   Gastrointestinal: Negative for abdominal distention, abdominal pain, blood in stool, constipation, diarrhea, nausea and vomiting.   Endocrine: Negative for cold intolerance, heat intolerance, polydipsia and polyuria.   Genitourinary: Negative.  Negative for difficulty urinating and frequency.   Musculoskeletal: Negative for arthralgias, back pain, gait problem, joint swelling, myalgias and neck pain.   Skin: Negative for color change, pallor, rash and wound.   Neurological: Negative for dizziness, tremors, weakness, light-headedness, numbness and headaches.   Hematological: Negative for adenopathy.   Psychiatric/Behavioral: Negative for behavioral problems, confusion, self-injury, sleep disturbance and suicidal ideas. The patient is  "not nervous/anxious.      Objective:   /80 (BP Location: Right arm, Patient Position: Sitting)   Pulse 96   Temp 97.3 °F (36.3 °C) (Tympanic)   Ht 5' 1" (1.549 m)   Wt 58.6 kg (129 lb 3 oz)   LMP 12/01/2017 (Exact Date)   SpO2 99%   BMI 24.41 kg/m²     Physical Exam   Constitutional: She is oriented to person, place, and time. She appears well-developed and well-nourished.   HENT:   Head: Normocephalic and atraumatic.   Right Ear: Hearing, tympanic membrane, external ear and ear canal normal. No tenderness.   Left Ear: Hearing, tympanic membrane, external ear and ear canal normal. No tenderness.   Nose: Mucosal edema and rhinorrhea present. No sinus tenderness. Right sinus exhibits maxillary sinus tenderness and frontal sinus tenderness. Left sinus exhibits maxillary sinus tenderness and frontal sinus tenderness.   Mouth/Throat: Uvula is midline and mucous membranes are normal. No oral lesions. Normal dentition. No dental abscesses, uvula swelling or dental caries. Oropharyngeal exudate and posterior oropharyngeal erythema present. No posterior oropharyngeal edema or tonsillar abscesses. No tonsillar exudate.   Eyes: Conjunctivae and EOM are normal. Pupils are equal, round, and reactive to light. Right eye exhibits no discharge. Left eye exhibits no discharge.   Neck: Normal range of motion. Neck supple.   Cardiovascular: Normal rate, regular rhythm and normal heart sounds.  Exam reveals no gallop and no friction rub.    No murmur heard.  Pulmonary/Chest: Effort normal and breath sounds normal. No respiratory distress. She has no wheezes. She has no rales.   Lymphadenopathy:     She has no cervical adenopathy.   Neurological: She is alert and oriented to person, place, and time.   Skin: Skin is warm. No rash noted. No erythema. No pallor.   Psychiatric: She has a normal mood and affect. Her behavior is normal. Judgment and thought content normal.   Nursing note and vitals reviewed.      Assessment: "     1. Acute sinusitis, recurrence not specified, unspecified location      Plan:   Acute sinusitis, recurrence not specified, unspecified location  -     betamethasone acetate-betamethasone sodium phosphate injection 6 mg; Inject 1 mL (6 mg total) into the muscle one time.  -     doxycycline (MONODOX) 100 MG capsule; Take 1 capsule (100 mg total) by mouth 2 (two) times daily. Do not take with milk or yogurt  Dispense: 20 capsule; Refill: 0    -cepacol max sore throat/cough drops. Educational handout on over-the-counter medications and at-home conservative care, pertinent to the patients diagnosis today, was handed to the patient and discussed in detail.    Return if symptoms worsen or fail to improve.

## 2017-12-20 NOTE — PATIENT INSTRUCTIONS
-Over-the-counter supportive tx for colds and cough:   -start flonase nasal spray (fluticasone) 1 spray each nostril once/day. Continue use for 2-3 weeks.   -Try OTC saline nasal spray a few times a day or nedi-pot using warm filtered water    - refrain from smoking, drink plenty of fluids, hot tea with honey, rest, take medications as prescribed, and use a humidifier or steam in the bathroom.   -Shower in the morning and evening to wash away any allergens and help reduce the production of mucus.   - Zinc lozenge, Emergen-C, or Airborne,  to boost immune system.     -No more than 10 in 24 hours.  -Cepacol sore throat/ cough drops  -Take tylenol or Advil for fever, sore throat, and muscle aches.  -warm salt water gargles or Listerine gargles for sore throat frequently throughout the day.  - Try OTC Mucinex (guafenesin) for cough with thick mucous.   -DM is for dextromethorphan. This is a cough suppressant.   -Phenylephrine/pseudophedrine or anything labeled with a D after it is for congestion.   Avoid decongestants if diagnosed with hypertension or arrhythmias. To avoid  rebound congestion, refrain from taking decongestant for longer than 5 day.    -For prevention,extremely important to quit smoking, get annual influenza vaccines, reduce exposure to air pollution, and to frequently wash hands to avoid spread of infection.

## 2017-12-26 ENCOUNTER — PATIENT MESSAGE (OUTPATIENT)
Dept: INTERNAL MEDICINE | Facility: CLINIC | Age: 32
End: 2017-12-26

## 2018-02-28 ENCOUNTER — OFFICE VISIT (OUTPATIENT)
Dept: INTERNAL MEDICINE | Facility: CLINIC | Age: 33
End: 2018-02-28
Payer: COMMERCIAL

## 2018-02-28 VITALS
OXYGEN SATURATION: 100 % | WEIGHT: 130.75 LBS | DIASTOLIC BLOOD PRESSURE: 82 MMHG | SYSTOLIC BLOOD PRESSURE: 118 MMHG | BODY MASS INDEX: 24.69 KG/M2 | TEMPERATURE: 98 F | HEIGHT: 61 IN | HEART RATE: 70 BPM

## 2018-02-28 DIAGNOSIS — H92.09 OTALGIA, UNSPECIFIED LATERALITY: Primary | ICD-10-CM

## 2018-02-28 DIAGNOSIS — J02.8 ACUTE PHARYNGITIS DUE TO OTHER SPECIFIED ORGANISMS: ICD-10-CM

## 2018-02-28 DIAGNOSIS — H65.191 OTHER ACUTE NONSUPPURATIVE OTITIS MEDIA OF RIGHT EAR, RECURRENCE NOT SPECIFIED: ICD-10-CM

## 2018-02-28 LAB — DEPRECATED S PYO AG THROAT QL EIA: NEGATIVE

## 2018-02-28 PROCEDURE — 3074F SYST BP LT 130 MM HG: CPT | Mod: S$GLB,,, | Performed by: NURSE PRACTITIONER

## 2018-02-28 PROCEDURE — 87880 STREP A ASSAY W/OPTIC: CPT | Mod: PO

## 2018-02-28 PROCEDURE — 99999 PR PBB SHADOW E&M-EST. PATIENT-LVL IV: CPT | Mod: PBBFAC,,, | Performed by: NURSE PRACTITIONER

## 2018-02-28 PROCEDURE — 87081 CULTURE SCREEN ONLY: CPT

## 2018-02-28 PROCEDURE — 3079F DIAST BP 80-89 MM HG: CPT | Mod: S$GLB,,, | Performed by: NURSE PRACTITIONER

## 2018-02-28 PROCEDURE — 99214 OFFICE O/P EST MOD 30 MIN: CPT | Mod: S$GLB,,, | Performed by: NURSE PRACTITIONER

## 2018-02-28 RX ORDER — AZITHROMYCIN 250 MG/1
250 TABLET, FILM COATED ORAL DAILY
Qty: 6 TABLET | Refills: 0 | Status: SHIPPED | OUTPATIENT
Start: 2018-02-28 | End: 2018-03-05

## 2018-03-01 NOTE — PROGRESS NOTES
"CC:   Chief Complaint   Patient presents with    Otalgia     left    Sore Throat    Nasal Congestion    Cough     HPI: This is a new problem.   Julienne Rosa is a 32 y.o. female with a complaint of URI.  The current episode started in the past 7 days.   The problem has been gradually worsening.   Associated symptoms included nasal congestion, rhinorrhea, sore throat, cough, ear pain     Pertinent negatives include chest pain, hemoptysis, dyspnea, wheezing   Treatments tried: OTC medications has been used and this has provided no relief.     Review of patient's allergies indicates:   Allergen Reactions    Ceclor [cefaclor] Other (See Comments)     unknown    Augmentin [amoxicillin-pot clavulanate] Hives    Sulfa (sulfonamide antibiotics) Rash       Outpatient Medications Prior to Visit   Medication Sig Dispense Refill    norethindrone-ethinyl estradiol (MICROGESTIN 1/20) 1-20 mg-mcg per tablet Take 1 tablet by mouth once daily. 84 tablet 3    diphenhyd-PE-acetaminophen (DELSYM COUGH-COLD NIGHTTIME) 12.5-5-325 mg/10 mL Liqd Take 25 mLs by mouth every evening.      gfhqyozye-IB-vaubftwm-guaifen (DELSYM COUGH-COLD DAYTIME) 10- mg/20 mL Liqd Take 25 mLs by mouth every 4 (four) hours as needed.      clobetasol 0.05% (TEMOVATE) 0.05 % Oint Apply topically 2 (two) times daily. 60 g 0    hydrOXYzine pamoate (VISTARIL) 25 MG Cap Take 1 capsule (25 mg total) by mouth every 6 (six) hours as needed (itching). 30 capsule 0    triamcinolone acetonide 0.1% (KENALOG) 0.1 % cream Apply topically 2 (two) times daily. 80 g 0     No facility-administered medications prior to visit.         Physical Exam   /82 (BP Location: Left arm, Patient Position: Sitting, BP Method: Medium (Manual))   Pulse 70   Temp 97.6 °F (36.4 °C) (Tympanic)   Ht 5' 1" (1.549 m)   Wt 59.3 kg (130 lb 11.7 oz)   LMP 02/20/2018 (Exact Date)   SpO2 100%   BMI 24.70 kg/m²   Constitutional: The patient appears well-developed and " well-nourished.   Head: Normocephalic and atraumatic.   Right Ear: Ear canal normal. No drainage, swelling or tenderness. Tympanic membrane is dull.   Left Ear: Ear canal normal. No drainage, swelling or tenderness. Tympanic membrane is erythematous and not bulging.   Nose: Mucosal edema and rhinorrhea present.   Mouth/Throat: Uvula is midline. Posterior oropharyngeal erythema present. No oropharyngeal exudate.        THE MUCOSA IS BOGGY AND ERYTHEMATOUS.     Cardiovascular: Normal rate, regular rhythm, S1 normal, S2 normal and normal heart sounds.  Exam reveals no gallop, no S3, no S4 and no friction rub.    No murmur heard.  Pulmonary/Chest: Effort normal and breath sounds normal. No stridor. Not tachypneic. No respiratory distress. The patient has no wheezes. The patient has no rhonchi. The patient has no rales.   Skin: The patient is not diaphoretic.     Encounter Diagnoses   Name Primary?    Otalgia, unspecified laterality Yes    Other acute nonsuppurative otitis media of right ear, recurrence not specified     Acute pharyngitis due to other specified organisms        PLAN:    Julienne was seen today for otalgia, sore throat, nasal congestion and cough.    Diagnoses and all orders for this visit:    Otalgia, unspecified laterality    Other acute nonsuppurative otitis media of right ear, recurrence not specified  -     azithromycin (ZITHROMAX Z-SELVIN) 250 MG tablet; Take 1 tablet (250 mg total) by mouth once daily. Take two today then one daily    Acute pharyngitis due to other specified organisms  -     Throat Screen, Rapid; Future  -     Throat Screen, Rapid    Other orders  -     Strep A culture, throat        Medications Ordered This Encounter      azithromycin (ZITHROMAX Z-SELVIN) 250 MG tablet          Sig: Take 1 tablet (250 mg total) by mouth once daily. Take two today then one daily          Dispense:  6 tablet          Refill:  0  Orders Placed This Encounter   Procedures    Throat Screen, Rapid      Standing Status:   Future     Number of Occurrences:   1     Standing Expiration Date:   4/29/2019    Strep A culture, throat     RTC if symptoms are worsening or changing significantly or if not improved by the end of therapy.

## 2018-03-02 ENCOUNTER — PATIENT MESSAGE (OUTPATIENT)
Dept: INTERNAL MEDICINE | Facility: CLINIC | Age: 33
End: 2018-03-02

## 2018-03-03 LAB — BACTERIA THROAT CULT: NORMAL

## 2018-04-12 ENCOUNTER — PATIENT MESSAGE (OUTPATIENT)
Dept: OBSTETRICS AND GYNECOLOGY | Facility: CLINIC | Age: 33
End: 2018-04-12

## 2018-04-12 DIAGNOSIS — Z30.011 ENCOUNTER FOR INITIAL PRESCRIPTION OF CONTRACEPTIVE PILLS: ICD-10-CM

## 2018-04-20 RX ORDER — NORETHINDRONE ACETATE AND ETHINYL ESTRADIOL .02; 1 MG/1; MG/1
1 TABLET ORAL DAILY
Qty: 84 TABLET | Refills: 3 | Status: SHIPPED | OUTPATIENT
Start: 2018-04-20 | End: 2018-12-19 | Stop reason: SDUPTHER

## 2018-04-20 NOTE — TELEPHONE ENCOUNTER
"Pt need birth control sent to [facility] again because her insurance changed. Pt stated nothing else needs to be updated. Please advise.     pt last pap was 12/29/15.      Pharmacy     [facility] - [place], [place] - [address] [place] [address]     "norethindrone-ethinyl estradiol (MICROGESTIN 1/20) 1-20 mg-mcg per tablet [38397]"   "

## 2018-11-08 ENCOUNTER — OFFICE VISIT (OUTPATIENT)
Dept: INTERNAL MEDICINE | Facility: CLINIC | Age: 33
End: 2018-11-08
Payer: COMMERCIAL

## 2018-11-08 VITALS
HEIGHT: 61 IN | HEART RATE: 96 BPM | OXYGEN SATURATION: 98 % | TEMPERATURE: 98 F | BODY MASS INDEX: 24.31 KG/M2 | DIASTOLIC BLOOD PRESSURE: 78 MMHG | WEIGHT: 128.75 LBS | SYSTOLIC BLOOD PRESSURE: 112 MMHG

## 2018-11-08 DIAGNOSIS — S13.9XXA SPRAIN, NECK, INITIAL ENCOUNTER: Primary | ICD-10-CM
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
## 2018-11-08 PROCEDURE — 3008F BODY MASS INDEX DOCD: CPT | Mod: CPTII,S$GLB,, | Performed by: FAMILY MEDICINE

## 2018-11-08 PROCEDURE — 99213 OFFICE O/P EST LOW 20 MIN: CPT | Mod: S$GLB,,, | Performed by: FAMILY MEDICINE

## 2018-11-08 PROCEDURE — 3078F DIAST BP <80 MM HG: CPT | Mod: CPTII,S$GLB,, | Performed by: FAMILY MEDICINE

## 2018-11-08 PROCEDURE — 99999 PR PBB SHADOW E&M-EST. PATIENT-LVL IV: CPT | Mod: PBBFAC,,, | Performed by: FAMILY MEDICINE

## 2018-11-08 PROCEDURE — 3074F SYST BP LT 130 MM HG: CPT | Mod: CPTII,S$GLB,, | Performed by: FAMILY MEDICINE

## 2018-11-08 RX ORDER — BACLOFEN 10 MG/1
TABLET ORAL
Refills: 0 | COMMUNITY
Start: 2018-10-04 | End: 2018-11-08

## 2018-11-08 NOTE — PATIENT INSTRUCTIONS
PATIENT:   Julienne Rosa   YOB: 1985   DATE OF VISIT:  11/8/18    TO WHOM IT MAY CONCERN:     Julienne was treated in my office 11/8/18 for a work-related injury that occurred on October 3, 2018. She is being referred for physical therapy for treatment of this problem. She is not being treated with prescription medications for this problem.    She is able to return to work without restrictions and without delay.    She is to return for re-evaluation in 1 month.    Please extend to Julienne all due courtesy and consideration and excuse her absence.    Sincerely,     SAMANTHA Dillard MD  [facility]  [address] [facility] [place] [address] [phone]

## 2018-11-08 NOTE — ASSESSMENT & PLAN NOTE
This problem is NEW TO ME.  Date of injury reported as October 3, 2018. ONSET of symptoms was within 24 hours thereafter. Mechanism of injury is trip and fall backwards. She did not hit her head. QUALITY of symptoms described as aching discomfort and muscle tension. LOCATION reported as posterior neck bilaterally, slightly extending into shoulders. SEVERITY of symptoms described as MILD to MODERATE. EXACERBATING FACTORS include prolonged or repeated activities that require forward bending. She reports that on October 4, she was evaluated at occupational medicine clinic and had cervical spine x-rays, which were reported as normal. She says she was treated with muscle relaxant, which she found ineffective and did not continue. She says that she has been doing home physical therapy, but her symptoms have persisted. She says that she returned to occupational medicine clinic a couple of weeks thereafter, and they referred her for physical therapy, but she says that this treatment was not approved by her workers compensation insurance until earlier this week. She is requesting another referral for physical therapy from me.

## 2018-11-08 NOTE — PROGRESS NOTES
CHIEF COMPLAINT  Neck Pain (10/3/2018)      HISTORY OF PRESENT ILLNESS    Problem List Items Addressed This Visit        Orthopedic    Work related injury    Relevant Orders    Ambulatory Referral to Physical/Occupational Therapy       Other    Sprain, neck, initial encounter - Primary    Current Assessment & Plan     This problem is NEW TO ME.  Date of injury reported as October 3, 2018. ONSET of symptoms was within 24 hours thereafter. Mechanism of injury is trip and fall backwards. She did not hit her head. QUALITY of symptoms described as aching discomfort and muscle tension. LOCATION reported as posterior neck bilaterally, slightly extending into shoulders. SEVERITY of symptoms described as MILD to MODERATE. EXACERBATING FACTORS include prolonged or repeated activities that require forward bending. She reports that on October 4, she was evaluated at occupational medicine clinic and had cervical spine x-rays, which were reported as normal. She says she was treated with muscle relaxant, which she found ineffective and did not continue. She says that she has been doing home physical therapy, but her symptoms have persisted. She says that she returned to occupational medicine clinic a couple of weeks thereafter, and they referred her for physical therapy, but she says that this treatment was not approved by her workers compensation insurance until earlier this week. She is requesting another referral for physical therapy from me.         Relevant Orders    Ambulatory Referral to Physical/Occupational Therapy          PAST MEDICAL HISTORY, FAMILY HISTORY and SOCIAL HISTORY, CURRENT MEDICATION LIST, and ALLERGY LIST reviewed by me (SAMANTHA Dillard MD) and are updated consistent with the patient's report.    REVIEW OF SYSTEMS  CONSTITUTIONAL: No fever reported.  CARDIOVASCULAR: No chest pain reported.  PULMONARY: No trouble breathing reported.     PHYSICAL EXAM  Vitals:    11/08/18 0759   BP: 112/78   BP Location:  "Right arm   Patient Position: Sitting   BP Method: Medium (Manual)   Pulse: 96   Temp: 98.1 °F (36.7 °C)   TempSrc: Tympanic   SpO2: 98%   Weight: 58.4 kg (128 lb 12 oz)   Height: 5' 1" (1.549 m)     CONSTITUTIONAL: Vital signs noted. No apparent distress. Does not appear acutely ill or septic. Appears adequately hydrated.  HEENT: External ENT grossly unremarkable. Hearing grossly intact. Oropharynx moist.  PULM: Lungs clear. Breathing unlabored.  HEART: Auscultation reveals regular rate and rhythm without murmur, gallop or rub.  DERM: Skin warm and moist with normal turgor.  NEURO: There are no gross focal motor deficits or gross deficits of cranial nerves III-XII.  PSYCHIATRIC: Alert and oriented x 3. Mood is grossly neutral. Affect appropriate. Judgment and insight not grossly compromised.  MUSCULOSKELETAL: Grossly normal stance and gait. Cervical spine and shoulders are normal to inspection and palpation. Cervical spine demonstrates full range of motion, although she describes discomfort with maximum flexion, extension, and rotation bilaterally. Spurling's test is negative.    ASSESSMENT and PLAN  Sprain, neck, initial encounter  -     Ambulatory Referral to Physical/Occupational Therapy    Work related injury  -     Ambulatory Referral to Physical/Occupational Therapy        PRESCRIPTION MEDICATION MANAGEMENT     Medications Discontinued During This Encounter   Medication Reason    baclofen (LIORESAL) 10 MG tablet Patient no longer taking    diphenhyd-PE-acetaminophen (DELSYM COUGH-COLD NIGHTTIME) 12.5-5-325 mg/10 mL Liqd Patient no longer taking    xfqmzszfm-VR-hyratxmp-guaifen (DELSYM COUGH-COLD DAYTIME) 10- mg/20 mL Liqd Patient no longer taking       Follow-up in about 1 month (around 12/8/2018) for re-evaluate problem(s) discussed today.    Patient Instructions           PATIENT:   Julienne Rosa   YOB: 1985   DATE OF VISIT:  11/8/18    TO WHOM IT MAY CONCERN:     Julienne was " "treated in my office 11/8/18 for a work-related injury that occurred on October 3, 2018. She is being referred for physical therapy for treatment of this problem. She is not being treated with prescription medications for this problem.    She is able to return to work without restrictions and without delay.    She is to return for re-evaluation in 1 month.    Please extend to Julienne all due courtesy and consideration and excuse her absence.    Sincerely,     SAMANTHA Dillard MD  [facility] [address] [place] [id number] [phone]            ABOUT THIS DOCUMENTATION:  · The order of the conditions listed in the HPI is one of convenience and does not necessarily reflect the chronology of the appointment, nor the relative importance of a condition.  · Documentation entered by me for this encounter was done in part using speech-recognition technology. Although I have made an effort to ensure accuracy, "sound like" errors may exist and should be interpreted in context.                        -SAMANTHA Dillard MD     "

## 2018-11-09 ENCOUNTER — TELEPHONE (OUTPATIENT)
Dept: INTERNAL MEDICINE | Facility: CLINIC | Age: 33
End: 2018-11-09

## 2018-11-09 NOTE — TELEPHONE ENCOUNTER
----- Message from Kaitlin Suero sent at 11/9/2018  8:57 AM CST -----  Work status summary needed on this patient in order to send off PT request

## 2018-11-12 ENCOUNTER — PATIENT MESSAGE (OUTPATIENT)
Dept: OBSTETRICS AND GYNECOLOGY | Facility: CLINIC | Age: 33
End: 2018-11-12

## 2018-11-12 ENCOUNTER — PATIENT MESSAGE (OUTPATIENT)
Dept: INTERNAL MEDICINE | Facility: CLINIC | Age: 33
End: 2018-11-12

## 2018-11-14 ENCOUNTER — OFFICE VISIT (OUTPATIENT)
Dept: INTERNAL MEDICINE | Facility: CLINIC | Age: 33
End: 2018-11-14
Payer: COMMERCIAL

## 2018-11-14 VITALS
BODY MASS INDEX: 24.52 KG/M2 | DIASTOLIC BLOOD PRESSURE: 74 MMHG | WEIGHT: 129.88 LBS | TEMPERATURE: 97 F | HEART RATE: 86 BPM | SYSTOLIC BLOOD PRESSURE: 110 MMHG | HEIGHT: 61 IN | OXYGEN SATURATION: 98 %

## 2018-11-14 DIAGNOSIS — J02.9 SORE THROAT: Primary | ICD-10-CM

## 2018-11-14 DIAGNOSIS — J06.9 VIRAL URI: ICD-10-CM

## 2018-11-14 LAB — DEPRECATED S PYO AG THROAT QL EIA: NEGATIVE

## 2018-11-14 PROCEDURE — 3074F SYST BP LT 130 MM HG: CPT | Mod: CPTII,S$GLB,, | Performed by: FAMILY MEDICINE

## 2018-11-14 PROCEDURE — 99999 PR PBB SHADOW E&M-EST. PATIENT-LVL III: CPT | Mod: PBBFAC,,, | Performed by: FAMILY MEDICINE

## 2018-11-14 PROCEDURE — 3008F BODY MASS INDEX DOCD: CPT | Mod: CPTII,S$GLB,, | Performed by: FAMILY MEDICINE

## 2018-11-14 PROCEDURE — 87880 STREP A ASSAY W/OPTIC: CPT

## 2018-11-14 PROCEDURE — 87081 CULTURE SCREEN ONLY: CPT

## 2018-11-14 PROCEDURE — 99213 OFFICE O/P EST LOW 20 MIN: CPT | Mod: S$GLB,,, | Performed by: FAMILY MEDICINE

## 2018-11-14 PROCEDURE — 3078F DIAST BP <80 MM HG: CPT | Mod: CPTII,S$GLB,, | Performed by: FAMILY MEDICINE

## 2018-11-14 NOTE — PATIENT INSTRUCTIONS

## 2018-11-16 LAB — BACTERIA THROAT CULT: NORMAL

## 2018-11-21 NOTE — PROGRESS NOTES
Subjective:       Patient ID: Julienne Rosa is a 33 y.o. female.    Chief Complaint: Sore Throat    PCP: Dr. Dillard      Sore Throat    This is a new problem. The current episode started in the past 7 days. The problem has been gradually worsening. Neither side of throat is experiencing more pain than the other. There has been no fever. The fever has been present for less than 1 day. The pain is at a severity of 7/10. The pain is moderate. Associated symptoms include congestion, a hoarse voice, a plugged ear sensation, swollen glands and trouble swallowing. Pertinent negatives include no abdominal pain, coughing, diarrhea, drooling, ear discharge, ear pain, headaches, neck pain, shortness of breath, stridor or vomiting. She has had exposure to strep. She has had no exposure to mono. She has tried NSAIDs, acetaminophen, cool liquids and gargles for the symptoms. The treatment provided mild relief.     Review of Systems   HENT: Positive for congestion, hoarse voice, sore throat and trouble swallowing. Negative for drooling, ear discharge and ear pain.    Respiratory: Negative for cough, shortness of breath and stridor.    Gastrointestinal: Negative for abdominal pain, diarrhea and vomiting.   Musculoskeletal: Negative for neck pain.   Neurological: Negative for headaches.       Objective:      Physical Exam   Constitutional: She is oriented to person, place, and time. She appears well-developed and well-nourished. No distress.   HENT:   Head: Normocephalic and atraumatic.   Right Ear: Tympanic membrane and ear canal normal.   Left Ear: Tympanic membrane and ear canal normal.   Nose: Mucosal edema and rhinorrhea present.   Mouth/Throat: Oropharynx is clear and moist and mucous membranes are normal.   Post nasal drip noted   Eyes: Conjunctivae and EOM are normal. Pupils are equal, round, and reactive to light. Right eye exhibits no discharge. Left eye exhibits no discharge.   Neck: Normal range of motion.  Neck supple.   Cardiovascular: Normal rate and regular rhythm. Exam reveals no gallop and no friction rub.   No murmur heard.  Pulmonary/Chest: Effort normal and breath sounds normal. No respiratory distress. She has no wheezes. She has no rales.   Lymphadenopathy:     She has no cervical adenopathy.   Neurological: She is alert and oriented to person, place, and time.   Skin: Skin is warm and dry. No rash noted.   Vitals reviewed.      Assessment:       1. Sore throat    2. Viral URI        Plan:     Problem List Items Addressed This Visit     None      Visit Diagnoses     Sore throat    -  Primary    Relevant Orders    THROAT SCREEN, RAPID (Completed)    Viral URI              Rapid strep negative, culture pending. Advised rest/fluids. Follow up if worse/persistent.

## 2018-12-10 ENCOUNTER — OFFICE VISIT (OUTPATIENT)
Dept: INTERNAL MEDICINE | Facility: CLINIC | Age: 33
End: 2018-12-10
Payer: COMMERCIAL

## 2018-12-10 VITALS
OXYGEN SATURATION: 99 % | SYSTOLIC BLOOD PRESSURE: 116 MMHG | HEART RATE: 75 BPM | HEIGHT: 61 IN | TEMPERATURE: 98 F | WEIGHT: 134.5 LBS | BODY MASS INDEX: 25.39 KG/M2 | DIASTOLIC BLOOD PRESSURE: 78 MMHG

## 2018-12-10 DIAGNOSIS — H92.02 ACUTE OTALGIA, LEFT: Primary | ICD-10-CM

## 2018-12-10 PROCEDURE — 3074F SYST BP LT 130 MM HG: CPT | Mod: CPTII,S$GLB,, | Performed by: FAMILY MEDICINE

## 2018-12-10 PROCEDURE — 3008F PR BODY MASS INDEX (BMI) DOCUMENTED: ICD-10-PCS | Mod: CPTII,S$GLB,, | Performed by: FAMILY MEDICINE

## 2018-12-10 PROCEDURE — 3078F PR MOST RECENT DIASTOLIC BLOOD PRESSURE < 80 MM HG: ICD-10-PCS | Mod: CPTII,S$GLB,, | Performed by: FAMILY MEDICINE

## 2018-12-10 PROCEDURE — 99214 PR OFFICE/OUTPT VISIT, EST, LEVL IV, 30-39 MIN: ICD-10-PCS | Mod: S$GLB,,, | Performed by: FAMILY MEDICINE

## 2018-12-10 PROCEDURE — 3008F BODY MASS INDEX DOCD: CPT | Mod: CPTII,S$GLB,, | Performed by: FAMILY MEDICINE

## 2018-12-10 PROCEDURE — 99999 PR PBB SHADOW E&M-EST. PATIENT-LVL III: ICD-10-PCS | Mod: PBBFAC,,, | Performed by: FAMILY MEDICINE

## 2018-12-10 PROCEDURE — 3074F PR MOST RECENT SYSTOLIC BLOOD PRESSURE < 130 MM HG: ICD-10-PCS | Mod: CPTII,S$GLB,, | Performed by: FAMILY MEDICINE

## 2018-12-10 PROCEDURE — 99999 PR PBB SHADOW E&M-EST. PATIENT-LVL III: CPT | Mod: PBBFAC,,, | Performed by: FAMILY MEDICINE

## 2018-12-10 PROCEDURE — 99214 OFFICE O/P EST MOD 30 MIN: CPT | Mod: S$GLB,,, | Performed by: FAMILY MEDICINE

## 2018-12-10 PROCEDURE — 3078F DIAST BP <80 MM HG: CPT | Mod: CPTII,S$GLB,, | Performed by: FAMILY MEDICINE

## 2018-12-10 NOTE — PROGRESS NOTES
"          CHIEF COMPLAINT  Otalgia      HISTORY OF PRESENT ILLNESS    PROBLEM/CONDITION: NEW PROBLEM is ear pain. QUALITY described as aching discomfort. LOCATION is left ear, extending to the retroauricular area. SEVERITY described as MODERATE. ONSET reported as a little more than a week ago. TIMING described as constant, not getting worse. She reports no ASSOCIATED otorrhea, change in hearing, or fever. Her ENT exam is unremarkable, except for MILD tenderness on palpation of the left retroauricular area, extending to the mastoid process. Air conduction > Bone conduction bilaterally. There is no hyperthermia, erythema, or induration. I explained to her that I was uncertain of the cause of her pain. We discussed possibility of mastoiditis. However, she is allergic to the main antibiotics that one would use to treat this. She agreed to accept my referral to ENT for further evaluation and treatment.    Problem List Items Addressed This Visit        ENT    Acute otalgia, left - Primary    Overview     AWAITING FURTHER EVALUATION as of 12/10/2018          Relevant Orders    Ambulatory referral to ENT          PAST MEDICAL HISTORY, FAMILY HISTORY and SOCIAL HISTORY, CURRENT MEDICATION LIST, and ALLERGY LIST reviewed by me (SAMANTHA Dillard MD) and are updated consistent with the patient's report.    REVIEW OF SYSTEMS  CONSTITUTIONAL: No fever reported.  CARDIOVASCULAR: No chest pain reported.  PULMONARY: No trouble breathing reported.     PHYSICAL EXAM  Vitals:    12/10/18 1600   BP: 116/78   BP Location: Right arm   Patient Position: Sitting   BP Method: Medium (Manual)   Pulse: 75   Temp: 97.6 °F (36.4 °C)   TempSrc: Oral   SpO2: 99%   Weight: 61 kg (134 lb 7.7 oz)   Height: 5' 1" (1.549 m)     CONSTITUTIONAL: Vital signs noted. No apparent distress. Does not appear acutely ill or septic. Appears adequately hydrated.  EYES: Pupils equal and reactive. Extraocular movements intact. Sclerae anicteric. Lids and " "conjunctiva unremarkable.  ENT: Her ENT exam is unremarkable, except for MILD tenderness on palpation of the left retroauricular area, extending to the mastoid process. Air conduction > Bone conduction bilaterally. There is no hyperthermia, erythema, or induration. External ENT otherwise unremarkable. Ear canals and visualized tympanic membranes are unremarkable. Hearing grossly intact. Nasal mucosa pink. Oropharynx moist without lesion, inflammation or exudate. Posterior oropharynx is symmetric.  NECK: Neck supple without meningismus. Trachea midline. No significant cervical lymphadenopathy.  PULM: Lungs clear. Breathing unlabored.  HEART: Auscultation reveals regular rate and rhythm without murmur, gallop or rub. No carotid bruit.  GI: Abdomen soft and nontender. Bowel sounds present.  DERM: Skin warm and moist with normal turgor.  NEURO: Strength is reasonably symmetric without gross focal motor deficits or gross deficits of cranial nerves III-XII.  PSYCH: Alert and oriented x 3. Mood is grossly euthymic. Affect appropriate. Judgment and insight not grossly compromised.  MSK: Grossly normal stance and gait.     ASSESSMENT and PLAN  Acute otalgia, left  -     Ambulatory referral to ENT        PRESCRIPTION MEDICATION MANAGEMENT     There are no discontinued medications.    Follow-up if symptoms worsen or fail to improve.    There are no Patient Instructions on file for this visit.    ABOUT THIS DOCUMENTATION:  · The order of the conditions listed in the HPI is one of convenience and does not necessarily reflect the chronology of the appointment, nor the relative importance of a condition.  · Documentation entered by me for this encounter was done in part using speech-recognition technology. Although I have made an effort to ensure accuracy, "sound like" errors may exist and should be interpreted in context.                        -SAMANTHA Dillard MD     "

## 2018-12-12 ENCOUNTER — OFFICE VISIT (OUTPATIENT)
Dept: OTOLARYNGOLOGY | Facility: CLINIC | Age: 33
End: 2018-12-12
Payer: COMMERCIAL

## 2018-12-12 ENCOUNTER — CLINICAL SUPPORT (OUTPATIENT)
Dept: AUDIOLOGY | Facility: CLINIC | Age: 33
End: 2018-12-12
Payer: COMMERCIAL

## 2018-12-12 VITALS
WEIGHT: 133.63 LBS | DIASTOLIC BLOOD PRESSURE: 67 MMHG | SYSTOLIC BLOOD PRESSURE: 103 MMHG | TEMPERATURE: 99 F | BODY MASS INDEX: 25.24 KG/M2 | HEART RATE: 70 BPM

## 2018-12-12 DIAGNOSIS — M26.622 ARTHRALGIA OF LEFT TEMPOROMANDIBULAR JOINT: ICD-10-CM

## 2018-12-12 DIAGNOSIS — H92.02 OTALGIA, LEFT: Primary | ICD-10-CM

## 2018-12-12 DIAGNOSIS — H92.02 ACUTE OTALGIA, LEFT: Primary | ICD-10-CM

## 2018-12-12 PROCEDURE — 92567 TYMPANOMETRY: CPT | Mod: S$GLB,,, | Performed by: AUDIOLOGIST-HEARING AID FITTER

## 2018-12-12 PROCEDURE — 99999 PR PBB SHADOW E&M-EST. PATIENT-LVL III: CPT | Mod: PBBFAC,,, | Performed by: PHYSICIAN ASSISTANT

## 2018-12-12 PROCEDURE — 92557 COMPREHENSIVE HEARING TEST: CPT | Mod: S$GLB,,, | Performed by: AUDIOLOGIST-HEARING AID FITTER

## 2018-12-12 PROCEDURE — 99243 OFF/OP CNSLTJ NEW/EST LOW 30: CPT | Mod: S$GLB,,, | Performed by: PHYSICIAN ASSISTANT

## 2018-12-12 NOTE — LETTER
December 12, 2018      SAMANTHA Dillard MD  [address] [facility] [address]           [facility]  [address] [facility] [address]  Phone: [phone]  Fax: [phone]          Patient: Julienne Rosa   MR Number: [id number]   YOB: 1985   Date of Visit: 12/12/2018       Dear Dr. SAMANTHA Dillard:    Thank you for referring Julienne Rosa to me for evaluation. Attached you will find relevant portions of my assessment and plan of care.    If you have questions, please do not hesitate to call me. I look forward to following Julienne Rosa along with you.    Sincerely,    Day Renee PA-C    Enclosure  CC:  No Recipients    If you would like to receive this communication electronically, please contact [email or web address] or [phone] to request more information on [employer] access.    For providers and/or their staff who would like to refer a patient to [facility], please contact us through our one-stop-shop provider referral line, [facility] , at [phone].    If you feel you have received this communication in error or would no longer like to receive these types of communications, please e-mail [email or web address]

## 2018-12-12 NOTE — PROGRESS NOTES
Referring Provider:Day Renee PA-C    Julienne Rosa was seen 12/12/2018 for an audiological evaluation.  Patient complains of otalgia of the left mastoid area for the last week. Denies tinnitus, hearing loss, noise exposure, and vertigo.    Otoscopy was unremarkable for the left ear and revealed slight cerumen in the right external auditory canal. Results reveal normal hearing in each ear from 250-8000 Hz.   Speech Reception Thresholds were  0 dBHL for the right ear and 10 dBHL for the left ear.   Word recognition scores were excellent for the right ear and excellent for the left ear.   Tympanograms were Type A, normal for the right ear and Type A, normal for the left ear.    Patient was counseled on the above findings.    Recommendations:  1. ENT  2. Hearing protection in noise.

## 2018-12-12 NOTE — PROGRESS NOTES
Subjective:       Patient ID: Julienne Rosa is a 33 y.o. female.    Chief Complaint: Otalgia (possible infection)    Patient is a very pleasant 33 year old female here to see me today for the first time in consultation at the request of Dr. Dillard for evaluation of left otalgia.  She says it started last week and has varied in intensity; usually a deep ache in and behind the ear.  Denies hearing loss; denies tinnitus; denies dizziness.  Denies any ear drainage.  She's had tenderness/dicomfort if lying on that ear.  She's been tender behind the LEFT ear as well but denies any erythema or edema.  Denies fever; denies cough; denies nasal drainage.  This AM she woke with mild hoarseness and scratchy throat on the left.  She also has stuffy nose today.  She had some pressure in that ear and feels like she needs to pop it.  She's had previous otologic surgery - tubes as a child.  She denies any history of significant loud noise exposure. She denies issues with dizziness.  She does have hx of TMJ 3 years ago and says her dentist recommended a splint but she thought her symptoms improved.  Denies recent dental work.  She gets some relief on left otalgia with Ibuprofen.          Review of Systems   Constitutional: Negative for activity change, appetite change and fever.   HENT: Positive for dental problem (grinds/clenches), ear pain (AS), postnasal drip, sore throat (today) and voice change (today). Negative for congestion, ear discharge, hearing loss, nosebleeds, rhinorrhea, sinus pressure, sinus pain, sneezing, tinnitus and trouble swallowing.    Eyes: Negative for discharge.   Respiratory: Negative for cough and shortness of breath.    Cardiovascular: Negative for chest pain and palpitations.   Gastrointestinal: Negative for diarrhea, nausea and vomiting.   Musculoskeletal: Negative for gait problem.   Allergic/Immunologic: Negative for food allergies.   Neurological: Negative for dizziness, light-headedness  and headaches.   Hematological: Negative for adenopathy.   Psychiatric/Behavioral: Negative for confusion.       Objective:      Physical Exam   Constitutional: She is oriented to person, place, and time. She appears well-developed and well-nourished. She is cooperative. No distress.   HENT:   Head: Normocephalic and atraumatic.   Right Ear: Hearing, tympanic membrane, external ear and ear canal normal. No drainage, swelling or tenderness. Tympanic membrane is not erythematous and not retracted. No middle ear effusion.   Left Ear: Hearing, tympanic membrane, external ear and ear canal normal. No drainage, swelling or tenderness. Tympanic membrane is not erythematous and not retracted.  No middle ear effusion.   Nose: Nose normal. No mucosal edema, rhinorrhea, nasal deformity or septal deviation. No epistaxis. Right sinus exhibits no maxillary sinus tenderness and no frontal sinus tenderness. Left sinus exhibits no maxillary sinus tenderness and no frontal sinus tenderness.   Mouth/Throat: Uvula is midline, oropharynx is clear and moist and mucous membranes are normal. Mucous membranes are not pale and not dry. No trismus in the jaw. Normal dentition. No uvula swelling. No oropharyngeal exudate, posterior oropharyngeal edema or posterior oropharyngeal erythema. Tonsils are 0 on the right. Tonsils are 0 on the left.   No erythema or edema over mastoids; mildly tender to palpation left pre- and post-auricular area   Eyes: Conjunctivae, EOM and lids are normal. Pupils are equal, round, and reactive to light. Right eye exhibits no chemosis. Left eye exhibits no chemosis. Right conjunctiva is not injected. Left conjunctiva is not injected. No scleral icterus. Right eye exhibits normal extraocular motion and no nystagmus. Left eye exhibits normal extraocular motion and no nystagmus.   Neck: Trachea normal and phonation normal. No tracheal tenderness present. No tracheal deviation present. No thyroid mass and no thyromegaly  present.   Cardiovascular: Intact distal pulses.   Pulmonary/Chest: Effort normal. No stridor. No respiratory distress.   Abdominal: She exhibits no distension.   Lymphadenopathy:        Head (right side): No submental, no submandibular, no preauricular and no posterior auricular adenopathy present.        Head (left side): No submental, no submandibular, no preauricular and no posterior auricular adenopathy present.     She has no cervical adenopathy.   Neurological: She is alert and oriented to person, place, and time. No cranial nerve deficit.   Skin: Skin is warm and dry. No rash noted. No erythema.   Psychiatric: She has a normal mood and affect. Her behavior is normal.         AUDIOGRAM today:          Assessment:       1. Acute otalgia, left    2. Arthralgia of left temporomandibular joint        Plan:         Reassured patient today that her ear exam is normal.  I see no middle ear effusions or signs of otitis externa.  She clinically has no signs of mastoiditis (no erythema, edema or fluctuance over the mastoid).  I think her otalgia is most likely referred from her jaw.  She does have slight negative pressure in her left ear compared to the right but her hearing sensitivity is normal AU.  We had a long discussion regarding the anatomy and function of the eustachian tube.  We discussed that the eustachian tube acts as a pump to keep the appropriate amount of pressure behind the ear drum.  I recommend OTC nasal steroid spray to be used on a daily basis, and we discussed that it will take 2-3 weeks of daily use to achieve maximal effectiveness.      We had a long discussion regarding the underlying pathology of temporomandibular joint dysfunction (TMD) as the cause of ear pain.  We further discussed conservative measures to treat TMD including avoiding gum and other foods that require lots of chewing, warm compresses, and scheduled antinflammatories.  If the pain persists, the patient will then schedule an  appointment with a dentist for further evaluation.    RTC only as needed.    Thanks for the referral Dr. Dillard.  Report returned via Epic.

## 2018-12-19 ENCOUNTER — OFFICE VISIT (OUTPATIENT)
Dept: OBSTETRICS AND GYNECOLOGY | Facility: CLINIC | Age: 33
End: 2018-12-19
Payer: COMMERCIAL

## 2018-12-19 VITALS
DIASTOLIC BLOOD PRESSURE: 78 MMHG | HEIGHT: 61 IN | BODY MASS INDEX: 25.34 KG/M2 | WEIGHT: 134.25 LBS | SYSTOLIC BLOOD PRESSURE: 110 MMHG

## 2018-12-19 DIAGNOSIS — Z01.419 WELL WOMAN EXAM WITH ROUTINE GYNECOLOGICAL EXAM: Primary | ICD-10-CM

## 2018-12-19 DIAGNOSIS — Z30.011 ENCOUNTER FOR INITIAL PRESCRIPTION OF CONTRACEPTIVE PILLS: ICD-10-CM

## 2018-12-19 DIAGNOSIS — Z12.4 CERVICAL CANCER SCREENING: ICD-10-CM

## 2018-12-19 PROCEDURE — 3074F SYST BP LT 130 MM HG: CPT | Mod: CPTII,S$GLB,, | Performed by: OBSTETRICS & GYNECOLOGY

## 2018-12-19 PROCEDURE — 99999 PR PBB SHADOW E&M-EST. PATIENT-LVL III: CPT | Mod: PBBFAC,,, | Performed by: OBSTETRICS & GYNECOLOGY

## 2018-12-19 PROCEDURE — 99395 PREV VISIT EST AGE 18-39: CPT | Mod: S$GLB,,, | Performed by: OBSTETRICS & GYNECOLOGY

## 2018-12-19 PROCEDURE — 87624 HPV HI-RISK TYP POOLED RSLT: CPT

## 2018-12-19 PROCEDURE — 3078F DIAST BP <80 MM HG: CPT | Mod: CPTII,S$GLB,, | Performed by: OBSTETRICS & GYNECOLOGY

## 2018-12-19 PROCEDURE — 88175 CYTOPATH C/V AUTO FLUID REDO: CPT

## 2018-12-19 RX ORDER — NORETHINDRONE ACETATE AND ETHINYL ESTRADIOL .02; 1 MG/1; MG/1
1 TABLET ORAL DAILY
Qty: 84 TABLET | Refills: 3 | Status: SHIPPED | OUTPATIENT
Start: 2018-12-19 | End: 2018-12-19 | Stop reason: SDUPTHER

## 2018-12-19 RX ORDER — MULTIVITAMIN
1 TABLET ORAL DAILY
COMMUNITY
End: 2019-10-01

## 2018-12-19 RX ORDER — NORETHINDRONE ACETATE AND ETHINYL ESTRADIOL .02; 1 MG/1; MG/1
1 TABLET ORAL DAILY
Qty: 84 TABLET | Refills: 3 | Status: SHIPPED | OUTPATIENT
Start: 2018-12-19 | End: 2019-10-01

## 2018-12-19 NOTE — PROGRESS NOTES
Subjective:       Patient ID: Julienne Rosa is a 33 y.o. female.    Chief Complaint:  Well Woman      History of Present Illness  HPI  Presents for well-woman exam.  No complaints other than decreased libido.  Is mutually monogamous with her .  Uses microgestin OCP's for contraception, and needs refill.  Pt and her  are contemplating conceiving again, but the patient has a lot of anxiety about another delivery.  Her first delivery was complicated by a long second stage, baby was OP, epidural did not work, and the patient had a postpartum hemorrhage.  Advised her that while she is still at risk of this happening again, her overall risk is lower, especially if she labors spontaneously.  Advised that each pregnancy and birthing experience is unique, but oftentimes, first deliveries tend to be the hardest for the mother.  Discussed option of primary .  States she will discuss it with her , but for now, wants OCP refills.   Last pap: 12/29/15: normal    GYN & OB History  Patient's last menstrual period was 2018.   Date of Last Pap: 2016    OB History    Para Term  AB Living   1 1 1     1   SAB TAB Ectopic Multiple Live Births         0 1      # Outcome Date GA Lbr Will/2nd Weight Sex Delivery Anes PTL Lv   1 Term 11/24/15 39w3d / 01:59 3.29 kg (7 lb 4.1 oz) M Vag-Spont EPI N PEYTON      Complications: Shoulder Dystocia          Review of Systems  Review of Systems   Constitutional: Negative for activity change, fatigue, fever and unexpected weight change.   Gastrointestinal: Negative for abdominal pain, bloating, constipation, diarrhea, nausea and vomiting.   Endocrine: Negative for hair loss and hot flashes.   Genitourinary: Positive for decreased libido. Negative for dyspareunia, dysuria, frequency, genital sores, hematuria, menorrhagia, menstrual problem, pelvic pain, urgency, vaginal bleeding, vaginal discharge, vaginal pain, dysmenorrhea, postcoital bleeding  and vaginal odor.   Hematological: Negative for adenopathy.   Breast: Negative for mass, mastodynia, nipple discharge and skin changes          Objective:    Physical Exam:   Constitutional: She is oriented to person, place, and time. She appears well-developed and well-nourished. No distress.      Neck: Neck supple. No thyromegaly present.     Pulmonary/Chest: Right breast exhibits no inverted nipple, no mass, no nipple discharge, no skin change, no tenderness, no bleeding and no swelling. Left breast exhibits no inverted nipple, no mass, no nipple discharge, no skin change, no tenderness, no bleeding and no swelling. Breasts are symmetrical.        Abdominal: Soft. She exhibits no distension and no mass. There is no tenderness. There is no rebound and no guarding.     Genitourinary: Pelvic exam was performed with patient supine. There is no rash, tenderness, lesion or injury on the right labia. There is no rash, tenderness, lesion or injury on the left labia. Uterus is not deviated, not enlarged, not fixed, not tender, not hosting fibroids and not experiencing uterine prolapse. Cervix is normal. Right adnexum displays no mass, no tenderness and no fullness. Left adnexum displays no mass, no tenderness and no fullness. No erythema, tenderness, bleeding, rectocele or cystocele in the vagina. No foreign body in the vagina. No signs of injury around the vagina. No vaginal discharge found. Cervix exhibits no motion tenderness, no discharge and no friability.        Uterus Size: 6 cm      Lymphadenopathy:        Right: No inguinal adenopathy present.        Left: No inguinal adenopathy present.    Neurological: She is alert and oriented to person, place, and time.     Psychiatric: She has a normal mood and affect.          Assessment:        1. Well woman exam with routine gynecological exam    2. Cervical cancer screening    3. Encounter for initial prescription of contraceptive pills                Plan:      Julienne  was seen today for well woman.    Diagnoses and all orders for this visit:    Well woman exam with routine gynecological exam    Cervical cancer screening  -     Liquid-based pap smear, screening  -     HPV High Risk Genotypes, PCR    Encounter for initial prescription of contraceptive pills  -     Discontinue: norethindrone-ethinyl estradiol (MICROGESTIN 1/20) 1-20 mg-mcg per tablet; Take 1 tablet by mouth once daily.  -     norethindrone-ethinyl estradiol (MICROGESTIN 1/20) 1-20 mg-mcg per tablet; Take 1 tablet by mouth once daily.    Discussed pap with HPV co-test.  Explained decreased libido could be an effect from her OCP's, but she does not want to stop them at this time.  RTC 1 year or sooner prn.

## 2018-12-26 LAB
HPV HR 12 DNA CVX QL NAA+PROBE: NEGATIVE
HPV16 AG SPEC QL: NEGATIVE
HPV18 DNA SPEC QL NAA+PROBE: NEGATIVE

## 2019-01-22 ENCOUNTER — CLINICAL SUPPORT (OUTPATIENT)
Dept: REHABILITATION | Facility: HOSPITAL | Age: 34
End: 2019-01-22
Payer: COMMERCIAL

## 2019-01-22 DIAGNOSIS — S13.9XXA SPRAIN, NECK, INITIAL ENCOUNTER: Primary | ICD-10-CM

## 2019-01-22 PROCEDURE — 97161 PT EVAL LOW COMPLEX 20 MIN: CPT

## 2019-01-22 PROCEDURE — 97110 THERAPEUTIC EXERCISES: CPT

## 2019-01-22 NOTE — PROGRESS NOTES
PHYSICAL THERAPY INITIAL OUTPATIENT EVALUATION    Referring Provider:  Dr. SAMANTHA Dillard    Diagnosis:       ICD-10-CM ICD-9-CM    1. Sprain, neck, initial encounter S13.9XXA 847.0           Orders:  Evaluate and Treat.    Date of Initial Evaluation: 1/22/19    Visit # 1    BACKGROUND: 33 year old s/p a fall in October 2018. She strained her neck during the fall and at one point could not move her neck. Since the fall, she has been able to move her neck muscles better with little discomfort. She is interested in exercises that will decrease stiffness and strengthen the muscles that were affected when she fell.    OBJECTIVE    Posture/Structure Examination: good posture    C Spine AROM:   % limited Pain   FB 0 n   RSB 0 n   LSB Slightly limited (less than 10%) y   RR 0 n   LR Less than 10% y   BB 0 n     UE ROM: ROM and strength WNLs    Palpation: tenderness along the right upper trapezius muscle.    Strength: WNL  Muscle (Myotome) Right Left   Shoulder Flex     Shoulder Abduction     Elbow Flexors (C5)     Wrist Extensors (C6)     Elbow Extensors (C7)     Finger Flexors (C8)     Hand Intrinsics (T1)       Neuro/Sensation:   No problems identified    Special Test: n/a    Function:Patient reports 12% disability based on score of the Neck Disability Index questionnaire.    ASSESSMENT:  The patient is a 33 y.o. year old female referred to physical therapy with complaints of right sided muscle tightness since a fall last October where she strained her neck.  Patient presents with the following objective findings: slightly impaired C/S left lateral flexion and left rotation due to muscle tightness.  These impairments are limiting patient's ability to  her toddler at times, especially at the end of the day. She reports no pain and stiffness most of the day but as the day goes on, the stiffness/discomfort increases.  Patient's prognosis is good for established goals.  Patient is motivated to perform exercises  independently at home to decrease stiffness and improve muscular strength.    Short Term Goals:    1. Pt will be I with HEP. - goal met    TREATMENT PROVIDED:  -Manual Therapy:  -----  -Modalities: patient verbalized understanding that heat on the stiff muscles can be beneficial along with massage to increase circulation  -Evaluation complete. Patient agreed with having a home exercise program to perform at home. She is motivated to continue the exercises provided.  -Education on proper posture, body mechanics and condition. Patient provided with a home written program to include:  -Cervical/trap stretch 3 ways, isometric cervical exercises (all planes), c/s retraction, scapular retraction, child's pose and downward dog for T/S stretching.    PLAN:  eval only. Patient will perform exercise program independently    Thank you for this referral.    Irina Butcher, PT

## 2019-02-02 ENCOUNTER — OFFICE VISIT (OUTPATIENT)
Dept: INTERNAL MEDICINE | Facility: CLINIC | Age: 34
End: 2019-02-02
Payer: COMMERCIAL

## 2019-02-02 VITALS
WEIGHT: 132.06 LBS | TEMPERATURE: 97 F | HEIGHT: 61 IN | HEART RATE: 82 BPM | DIASTOLIC BLOOD PRESSURE: 84 MMHG | SYSTOLIC BLOOD PRESSURE: 118 MMHG | OXYGEN SATURATION: 99 % | BODY MASS INDEX: 24.93 KG/M2

## 2019-02-02 DIAGNOSIS — H92.02 ACUTE OTALGIA, LEFT: ICD-10-CM

## 2019-02-02 DIAGNOSIS — J30.1 SEASONAL ALLERGIC RHINITIS DUE TO POLLEN: Chronic | ICD-10-CM

## 2019-02-02 DIAGNOSIS — J06.9 VIRAL URI WITH COUGH: Primary | ICD-10-CM

## 2019-02-02 PROCEDURE — 99214 OFFICE O/P EST MOD 30 MIN: CPT | Mod: S$GLB,,, | Performed by: FAMILY MEDICINE

## 2019-02-02 PROCEDURE — 3079F DIAST BP 80-89 MM HG: CPT | Mod: CPTII,S$GLB,, | Performed by: FAMILY MEDICINE

## 2019-02-02 PROCEDURE — 3074F PR MOST RECENT SYSTOLIC BLOOD PRESSURE < 130 MM HG: ICD-10-PCS | Mod: CPTII,S$GLB,, | Performed by: FAMILY MEDICINE

## 2019-02-02 PROCEDURE — 99999 PR PBB SHADOW E&M-EST. PATIENT-LVL III: ICD-10-PCS | Mod: PBBFAC,,, | Performed by: FAMILY MEDICINE

## 2019-02-02 PROCEDURE — 3008F BODY MASS INDEX DOCD: CPT | Mod: CPTII,S$GLB,, | Performed by: FAMILY MEDICINE

## 2019-02-02 PROCEDURE — 3074F SYST BP LT 130 MM HG: CPT | Mod: CPTII,S$GLB,, | Performed by: FAMILY MEDICINE

## 2019-02-02 PROCEDURE — 3079F PR MOST RECENT DIASTOLIC BLOOD PRESSURE 80-89 MM HG: ICD-10-PCS | Mod: CPTII,S$GLB,, | Performed by: FAMILY MEDICINE

## 2019-02-02 PROCEDURE — 99999 PR PBB SHADOW E&M-EST. PATIENT-LVL III: CPT | Mod: PBBFAC,,, | Performed by: FAMILY MEDICINE

## 2019-02-02 PROCEDURE — 3008F PR BODY MASS INDEX (BMI) DOCUMENTED: ICD-10-PCS | Mod: CPTII,S$GLB,, | Performed by: FAMILY MEDICINE

## 2019-02-02 PROCEDURE — 99214 PR OFFICE/OUTPT VISIT, EST, LEVL IV, 30-39 MIN: ICD-10-PCS | Mod: S$GLB,,, | Performed by: FAMILY MEDICINE

## 2019-02-02 RX ORDER — FLUTICASONE PROPIONATE 50 MCG
2 SPRAY, SUSPENSION (ML) NASAL DAILY
Qty: 3 BOTTLE | Refills: 3 | Status: SHIPPED | OUTPATIENT
Start: 2019-02-02 | End: 2019-02-02 | Stop reason: SDUPTHER

## 2019-02-02 RX ORDER — PREDNISONE 5 MG/1
TABLET ORAL
Qty: 30 TABLET | Refills: 0 | Status: SHIPPED | OUTPATIENT
Start: 2019-02-02 | End: 2019-04-09 | Stop reason: ALTCHOICE

## 2019-02-02 RX ORDER — FLUTICASONE PROPIONATE 50 MCG
2 SPRAY, SUSPENSION (ML) NASAL DAILY
Qty: 1 BOTTLE | Refills: 11 | Status: SHIPPED | OUTPATIENT
Start: 2019-02-02 | End: 2020-02-02

## 2019-02-02 NOTE — ASSESSMENT & PLAN NOTE
She treats with over-the-counter fluticasone nasal spray, but she says she has not been taking consistently.  I instructed her on proper use and encouraged her to take the medication consistently every day during her allergy seasons.

## 2019-02-02 NOTE — PROGRESS NOTES
CHIEF COMPLAINT  Cough and Nasal Congestion      HISTORY OF PRESENT ILLNESS  New problem is upper respiratory illness symptoms.  Quality described as nonproductive cough.  Associated symptoms include nasal congestion and coryza and postnasal drip.  Onset reported as over the last several days.  Timing described as worse at night and gradually worsening overall.  Cannot identify exacerbating or alleviating factors.  Severity of symptoms described as moderate to moderately severe at worst. I educated her on the apparently viral nature of this acute illness, how the management was largely supportive and symptom focused. We discussed the risks and benefits of treatment options, and she feels that the severity of her symptoms is sufficient to warrant the treatment prescribed. I discouraged the use of over-the-counter cough and cold medications. I told her to expect gradual improvement and resolution of symptoms over the next week or two, and I instructed her to let me know if her illness failed to follow this expected course.     Problem List Items Addressed This Visit        ENT    RESOLVED: Acute otalgia, left    Current Assessment & Plan     This condition appears to be RESOLVED. She reports no residual symptoms or impairment.             Other    Seasonal allergic rhinitis due to pollen (Chronic)    Current Assessment & Plan     She treats with over-the-counter fluticasone nasal spray, but she says she has not been taking consistently.  I instructed her on proper use and encouraged her to take the medication consistently every day during her allergy seasons.           Other Visit Diagnoses     Viral URI with cough    -  Primary    Relevant Medications    predniSONE (DELTASONE) 5 MG tablet    fluticasone (FLONASE) 50 mcg/actuation nasal spray            REVIEW OF SYSTEMS  CONSTITUTIONAL: No objective fever reported.  PULMONARY: No hemoptysis or difficulty breathing reported.  CARDIOVASCULAR: No chest pain or orthopnea  "reported.     PHYSICAL EXAM  Vitals:    02/02/19 0935   BP: 118/84   BP Location: Right arm   Patient Position: Sitting   BP Method: Medium (Manual)   Pulse: 82   Temp: 96.6 °F (35.9 °C)   TempSrc: Tympanic   SpO2: 99%   Weight: 59.9 kg (132 lb 0.9 oz)   Height: 5' 1" (1.549 m)     CONSTITUTIONAL: Vital signs noted. No apparent distress. Does not appear acutely ill or septic. Appears adequately hydrated.  EYES: Pupils equal and reactive. Extraocular movements intact. Sclerae anicteric. Lids and conjunctiva unremarkable.  ENT: External ENT grossly unremarkable. Ear canals and visualized tympanic membranes are unremarkable. Hearing grossly intact. Nasal mucosa pink. Oropharynx moist without lesion, inflammation or exudate. Posterior oropharynx is symmetric.  NECK: Neck supple without meningismus. Trachea midline. No significant cervical lymphadenopathy.  PULM: Lungs clear. Breathing unlabored.  HEART: Auscultation reveals regular rate and rhythm.  DERM: Skin warm and moist with normal turgor.     Follow-up if symptoms worsen or fail to improve.    Documentation entered by me for this encounter may have been done in part using speech-recognition technology. Although I have made an effort to ensure accuracy, "sound like" errors may exist and should be interpreted in context. -SAMANTHA Dillard MD      There are no Patient Instructions on file for this visit.   "

## 2019-04-07 ENCOUNTER — PATIENT MESSAGE (OUTPATIENT)
Dept: OBSTETRICS AND GYNECOLOGY | Facility: CLINIC | Age: 34
End: 2019-04-07

## 2019-04-09 ENCOUNTER — OFFICE VISIT (OUTPATIENT)
Dept: OBSTETRICS AND GYNECOLOGY | Facility: CLINIC | Age: 34
End: 2019-04-09
Payer: COMMERCIAL

## 2019-04-09 VITALS
SYSTOLIC BLOOD PRESSURE: 110 MMHG | HEIGHT: 61 IN | DIASTOLIC BLOOD PRESSURE: 70 MMHG | WEIGHT: 132.06 LBS | BODY MASS INDEX: 24.93 KG/M2

## 2019-04-09 DIAGNOSIS — B37.31 CANDIDA VAGINITIS: Primary | ICD-10-CM

## 2019-04-09 PROBLEM — J30.1 SEASONAL ALLERGIC RHINITIS DUE TO POLLEN: Chronic | Status: RESOLVED | Noted: 2019-02-02 | Resolved: 2019-04-09

## 2019-04-09 PROBLEM — S13.9XXA SPRAIN, NECK, INITIAL ENCOUNTER: Status: RESOLVED | Noted: 2018-11-08 | Resolved: 2019-04-09

## 2019-04-09 PROCEDURE — 99999 PR PBB SHADOW E&M-EST. PATIENT-LVL III: CPT | Mod: PBBFAC,,, | Performed by: NURSE PRACTITIONER

## 2019-04-09 PROCEDURE — 3008F BODY MASS INDEX DOCD: CPT | Mod: CPTII,S$GLB,, | Performed by: NURSE PRACTITIONER

## 2019-04-09 PROCEDURE — 87480 CANDIDA DNA DIR PROBE: CPT

## 2019-04-09 PROCEDURE — 99999 PR PBB SHADOW E&M-EST. PATIENT-LVL III: ICD-10-PCS | Mod: PBBFAC,,, | Performed by: NURSE PRACTITIONER

## 2019-04-09 PROCEDURE — 3078F DIAST BP <80 MM HG: CPT | Mod: CPTII,S$GLB,, | Performed by: NURSE PRACTITIONER

## 2019-04-09 PROCEDURE — 3074F PR MOST RECENT SYSTOLIC BLOOD PRESSURE < 130 MM HG: ICD-10-PCS | Mod: CPTII,S$GLB,, | Performed by: NURSE PRACTITIONER

## 2019-04-09 PROCEDURE — 3078F PR MOST RECENT DIASTOLIC BLOOD PRESSURE < 80 MM HG: ICD-10-PCS | Mod: CPTII,S$GLB,, | Performed by: NURSE PRACTITIONER

## 2019-04-09 PROCEDURE — 3008F PR BODY MASS INDEX (BMI) DOCUMENTED: ICD-10-PCS | Mod: CPTII,S$GLB,, | Performed by: NURSE PRACTITIONER

## 2019-04-09 PROCEDURE — 3074F SYST BP LT 130 MM HG: CPT | Mod: CPTII,S$GLB,, | Performed by: NURSE PRACTITIONER

## 2019-04-09 PROCEDURE — 99213 OFFICE O/P EST LOW 20 MIN: CPT | Mod: S$GLB,,, | Performed by: NURSE PRACTITIONER

## 2019-04-09 PROCEDURE — 87510 GARDNER VAG DNA DIR PROBE: CPT

## 2019-04-09 PROCEDURE — 99213 PR OFFICE/OUTPT VISIT, EST, LEVL III, 20-29 MIN: ICD-10-PCS | Mod: S$GLB,,, | Performed by: NURSE PRACTITIONER

## 2019-04-09 RX ORDER — TRIAMCINOLONE ACETONIDE 1 MG/G
CREAM TOPICAL 2 TIMES DAILY
Qty: 15 G | Refills: 0 | Status: SHIPPED | OUTPATIENT
Start: 2019-04-09 | End: 2019-10-01

## 2019-04-09 RX ORDER — FLUCONAZOLE 150 MG/1
150 TABLET ORAL DAILY
Qty: 2 TABLET | Refills: 0 | Status: SHIPPED | OUTPATIENT
Start: 2019-04-09 | End: 2019-04-11

## 2019-04-09 NOTE — PROGRESS NOTES
CC: Vaginal itching    Julienne Rosa is a 33 y.o. female  presents for vaginal itching for 4 days.  LMP: Patient's last menstrual period was 2019..  No pelvic pain. OTC meds did not resolve her issues.      Past Medical History:   Diagnosis Date    Abnormal Pap smear of vagina     x 2-had 2 normal colposcopies//paps have been normal since    Mental disorder     depression and anxiety    Seasonal allergic rhinitis due to pollen 2019     Past Surgical History:   Procedure Laterality Date    MOUTH SURGERY      tonsilectomy       Social History     Socioeconomic History    Marital status:      Spouse name: Not on file    Number of children: Not on file    Years of education: Not on file    Highest education level: Not on file   Occupational History    Not on file   Social Needs    Financial resource strain: Not on file    Food insecurity:     Worry: Not on file     Inability: Not on file    Transportation needs:     Medical: Not on file     Non-medical: Not on file   Tobacco Use    Smoking status: Never Smoker    Smokeless tobacco: Never Used   Substance and Sexual Activity    Alcohol use: Yes     Frequency: 2-4 times a month     Drinks per session: 1 or 2     Binge frequency: Never     Comment: socially    Drug use: No    Sexual activity: Yes     Partners: Male     Birth control/protection: OCP   Lifestyle    Physical activity:     Days per week: Not on file     Minutes per session: Not on file    Stress: Not on file   Relationships    Social connections:     Talks on phone: Not on file     Gets together: Not on file     Attends [employer] service: Not on file     Active member of club or organization: Not on file     Attends meetings of clubs or organizations: Not on file     Relationship status: Not on file   Other Topics Concern    Not on file   Social History Narrative    Not on file     Family History   Problem Relation Age of Onset    No Known Problems  "Paternal Grandfather     No Known Problems Paternal Grandmother     No Known Problems Maternal Grandmother     No Known Problems Maternal Grandfather     No Known Problems Father     No Known Problems Mother     No Known Problems Brother     No Known Problems Sister     Breast cancer Neg Hx     Colon cancer Neg Hx     Ovarian cancer Neg Hx      OB History        1    Para   1    Term   1            AB        Living   1       SAB        TAB        Ectopic        Multiple   0    Live Births   1                 /70   Ht 5' 1" (1.549 m)   Wt 59.9 kg (132 lb 0.9 oz)   LMP 2019   BMI 24.95 kg/m²       ROS:  GENERAL: Denies weight gain or weight loss. Feeling well overall.    URINARY: No frequency, dysuria, hematuria, or burning on urination.  REPRODUCTIVE: See HPI.       PHYSICAL EXAM:  APPEARANCE: Well nourished, well developed, in no acute distress.  AFFECT: WNL, alert and oriented x 3  PELVIC: External genitalia erythema consistent with yeast .. Vagina scant white discharge.    1. Candida vaginitis  Vaginosis Screen by DNA Probe     PLAN:  Wet prep: yeast  Diflucan and Kenalog cream             "

## 2019-04-17 LAB
BACTERIAL VAGINOSIS DNA: NEGATIVE
CANDIDA GLABRATA DNA: NEGATIVE
CANDIDA KRUSEI DNA: NEGATIVE
CANDIDA RRNA VAG QL PROBE: NEGATIVE
T VAGINALIS RRNA GENITAL QL PROBE: NEGATIVE

## 2019-09-10 DIAGNOSIS — Z32.01 POSITIVE PREGNANCY TEST: Primary | ICD-10-CM

## 2019-10-01 ENCOUNTER — LAB VISIT (OUTPATIENT)
Dept: LAB | Facility: HOSPITAL | Age: 34
End: 2019-10-01
Attending: OBSTETRICS & GYNECOLOGY
Payer: COMMERCIAL

## 2019-10-01 ENCOUNTER — PROCEDURE VISIT (OUTPATIENT)
Dept: OBSTETRICS AND GYNECOLOGY | Facility: CLINIC | Age: 34
End: 2019-10-01
Payer: COMMERCIAL

## 2019-10-01 ENCOUNTER — OFFICE VISIT (OUTPATIENT)
Dept: OBSTETRICS AND GYNECOLOGY | Facility: CLINIC | Age: 34
End: 2019-10-01
Payer: COMMERCIAL

## 2019-10-01 VITALS
WEIGHT: 133.19 LBS | SYSTOLIC BLOOD PRESSURE: 118 MMHG | DIASTOLIC BLOOD PRESSURE: 64 MMHG | BODY MASS INDEX: 25.14 KG/M2 | HEIGHT: 61 IN

## 2019-10-01 DIAGNOSIS — Z32.01 PREGNANCY EXAMINATION OR TEST, POSITIVE RESULT: ICD-10-CM

## 2019-10-01 DIAGNOSIS — Z32.01 POSITIVE PREGNANCY TEST: ICD-10-CM

## 2019-10-01 DIAGNOSIS — Z32.01 PREGNANCY EXAMINATION OR TEST, POSITIVE RESULT: Primary | ICD-10-CM

## 2019-10-01 DIAGNOSIS — Z87.59 HISTORY OF POSTPARTUM HEMORRHAGE: ICD-10-CM

## 2019-10-01 DIAGNOSIS — Z87.59 HISTORY OF SHOULDER DYSTOCIA IN PRIOR PREGNANCY: ICD-10-CM
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
## 2019-10-01 LAB
BASOPHILS # BLD AUTO: 0.06 K/UL (ref 0–0.2)
BASOPHILS NFR BLD: 0.6 % (ref 0–1.9)
DIFFERENTIAL METHOD: ABNORMAL
EOSINOPHIL # BLD AUTO: 0.1 K/UL (ref 0–0.5)
EOSINOPHIL NFR BLD: 0.7 % (ref 0–8)
ERYTHROCYTE [DISTWIDTH] IN BLOOD BY AUTOMATED COUNT: 12.5 % (ref 11.5–14.5)
HCT VFR BLD AUTO: 42.9 % (ref 37–48.5)
HGB BLD-MCNC: 13.6 G/DL (ref 12–16)
IMM GRANULOCYTES # BLD AUTO: 0.03 K/UL (ref 0–0.04)
IMM GRANULOCYTES NFR BLD AUTO: 0.3 % (ref 0–0.5)
LYMPHOCYTES # BLD AUTO: 1.9 K/UL (ref 1–4.8)
LYMPHOCYTES NFR BLD: 19 % (ref 18–48)
MCH RBC QN AUTO: 29.8 PG (ref 27–31)
MCHC RBC AUTO-ENTMCNC: 31.7 G/DL (ref 32–36)
MCV RBC AUTO: 94 FL (ref 82–98)
MONOCYTES # BLD AUTO: 0.7 K/UL (ref 0.3–1)
MONOCYTES NFR BLD: 6.7 % (ref 4–15)
NEUTROPHILS # BLD AUTO: 7.4 K/UL (ref 1.8–7.7)
NEUTROPHILS NFR BLD: 72.7 % (ref 38–73)
NRBC BLD-RTO: 0 /100 WBC
PLATELET # BLD AUTO: 329 K/UL (ref 150–350)
PMV BLD AUTO: 11.5 FL (ref 9.2–12.9)
RBC # BLD AUTO: 4.57 M/UL (ref 4–5.4)
WBC # BLD AUTO: 10.13 K/UL (ref 3.9–12.7)

## 2019-10-01 PROCEDURE — 99213 PR OFFICE/OUTPT VISIT, EST, LEVL III, 20-29 MIN: ICD-10-PCS | Mod: S$GLB,,, | Performed by: OBSTETRICS & GYNECOLOGY

## 2019-10-01 PROCEDURE — 3078F PR MOST RECENT DIASTOLIC BLOOD PRESSURE < 80 MM HG: ICD-10-PCS | Mod: CPTII,S$GLB,, | Performed by: OBSTETRICS & GYNECOLOGY

## 2019-10-01 PROCEDURE — 86592 SYPHILIS TEST NON-TREP QUAL: CPT

## 2019-10-01 PROCEDURE — 87491 CHLMYD TRACH DNA AMP PROBE: CPT

## 2019-10-01 PROCEDURE — 3074F PR MOST RECENT SYSTOLIC BLOOD PRESSURE < 130 MM HG: ICD-10-PCS | Mod: CPTII,S$GLB,, | Performed by: OBSTETRICS & GYNECOLOGY

## 2019-10-01 PROCEDURE — 99213 OFFICE O/P EST LOW 20 MIN: CPT | Mod: S$GLB,,, | Performed by: OBSTETRICS & GYNECOLOGY

## 2019-10-01 PROCEDURE — 3008F BODY MASS INDEX DOCD: CPT | Mod: CPTII,S$GLB,, | Performed by: OBSTETRICS & GYNECOLOGY

## 2019-10-01 PROCEDURE — 86703 HIV-1/HIV-2 1 RESULT ANTBDY: CPT

## 2019-10-01 PROCEDURE — 36415 COLL VENOUS BLD VENIPUNCTURE: CPT

## 2019-10-01 PROCEDURE — 87086 URINE CULTURE/COLONY COUNT: CPT

## 2019-10-01 PROCEDURE — 76801 OB US < 14 WKS SINGLE FETUS: CPT | Mod: S$GLB,,, | Performed by: OBSTETRICS & GYNECOLOGY

## 2019-10-01 PROCEDURE — 81001 URINALYSIS AUTO W/SCOPE: CPT

## 2019-10-01 PROCEDURE — 3074F SYST BP LT 130 MM HG: CPT | Mod: CPTII,S$GLB,, | Performed by: OBSTETRICS & GYNECOLOGY

## 2019-10-01 PROCEDURE — 99999 PR PBB SHADOW E&M-EST. PATIENT-LVL III: CPT | Mod: PBBFAC,,, | Performed by: OBSTETRICS & GYNECOLOGY

## 2019-10-01 PROCEDURE — 85025 COMPLETE CBC W/AUTO DIFF WBC: CPT

## 2019-10-01 PROCEDURE — 86901 BLOOD TYPING SEROLOGIC RH(D): CPT

## 2019-10-01 PROCEDURE — 86762 RUBELLA ANTIBODY: CPT

## 2019-10-01 PROCEDURE — 87340 HEPATITIS B SURFACE AG IA: CPT

## 2019-10-01 PROCEDURE — 3078F DIAST BP <80 MM HG: CPT | Mod: CPTII,S$GLB,, | Performed by: OBSTETRICS & GYNECOLOGY

## 2019-10-01 PROCEDURE — 99999 PR PBB SHADOW E&M-EST. PATIENT-LVL III: ICD-10-PCS | Mod: PBBFAC,,, | Performed by: OBSTETRICS & GYNECOLOGY

## 2019-10-01 PROCEDURE — 3008F PR BODY MASS INDEX (BMI) DOCUMENTED: ICD-10-PCS | Mod: CPTII,S$GLB,, | Performed by: OBSTETRICS & GYNECOLOGY

## 2019-10-01 PROCEDURE — 76801 PR US, OB <14WKS, TRANSABD, SINGLE GESTATION: ICD-10-PCS | Mod: S$GLB,,, | Performed by: OBSTETRICS & GYNECOLOGY

## 2019-10-01 NOTE — PROGRESS NOTES
Subjective:       Patient ID: Julinene Rosa is a 34 y.o. female.    Chief Complaint:  Possible Pregnancy      History of Present Illness  HPI  Presents for pregnancy risk assessment.  LMP 19.  She denies any vaginal bleeding or pelvic pain.  This is her second pregnancy.  She and her  are excited.  In , she had a term  with me.  Her delivery was complicated by a prolonged second stage, 2 maneuver shoulder dystocia, and postpartum hemorrhage due to uterine atony.  She and the baby ultimately did fine.  No brachial plexus injury, and she did not require PRBC's.  She is very nervous about another vaginal delivery, and has considered a  instead.  She and her  are still contemplating it.  They are certain this will be their last child.    GYN & OB History  Patient's last menstrual period was 2019 (exact date).   Date of Last Pap: 2018    OB History    Para Term  AB Living   1 1 1     1   SAB TAB Ectopic Multiple Live Births         0 1      # Outcome Date GA Lbr Will/2nd Weight Sex Delivery Anes PTL Lv   1 Term 11/24/15 39w3d / 01:59 3.29 kg (7 lb 4.1 oz) M Vag-Spont EPI N PEYTON      Complications: Shoulder Dystocia       Review of Systems  Review of Systems   Constitutional: Negative for activity change, fatigue, fever and unexpected weight change.   Gastrointestinal: Positive for nausea. Negative for abdominal pain, bloating, constipation, diarrhea and vomiting.   Endocrine: Negative for hair loss and hot flashes.   Genitourinary: Positive for menstrual problem (amenorrhea). Negative for dysmenorrhea, dyspareunia, dysuria, frequency, genital sores, hematuria, menorrhagia, pelvic pain, urgency, vaginal bleeding, vaginal discharge, vaginal pain, postcoital bleeding and vaginal odor.   Integumentary:  Negative for rash, hair changes, breast mass, nipple discharge and breast skin changes.   Hematological: Negative for adenopathy.   Breast: Negative for mass,  mastodynia, nipple discharge and skin changes          Objective:    Physical Exam:   Constitutional: She is oriented to person, place, and time. She appears well-developed and well-nourished. No distress.                           Neurological: She is alert and oriented to person, place, and time.    Skin: No erythema. No pallor.    Psychiatric: She has a normal mood and affect. Her behavior is normal. Judgment and thought content normal.        Ultrasound today: viable IUP, CRL 8w4d,  bpm  Assessment:        1. Pregnancy examination or test, positive result    2. History of postpartum hemorrhage    3. History of shoulder dystocia in prior pregnancy                Plan:      Julienne was seen today for possible pregnancy.    Diagnoses and all orders for this visit:    Pregnancy examination or test, positive result  -     C. trachomatis/N. gonorrhoeae by AMP DNA  -     CBC auto differential; Future  -     Hepatitis B surface antigen; Future  -     HIV 1/2 Ag/Ab (4th Gen); Future  -     RPR; Future  -     Rubella antibody, IgG; Future  -     Type & Screen - Ob Profile; Future  -     Urinalysis  -     Urine culture    History of postpartum hemorrhage    History of shoulder dystocia in prior pregnancy    The patient was counseled on common complaints of pregnancy.  She was given bleeding and pain precautions.  I oriented the patient to the collaborative CNM/physician practice, and all questions were answered.    Discussed flu vaccine.  Wants more information on it.  Not sure she will get it.  Discussed vaginal delivery versus .  She is undecided at this time, and we will readdress this later in the pregnancy.  RTC 3-4 weeks for initial OB visit.  Sign up for Connected MOM at that visit.

## 2019-10-01 NOTE — PATIENT INSTRUCTIONS
Pregnancy    Your exam today shows that you are pregnant.  Pregnancy symptoms  During pregnancy your bodys hormones change. This causes physical and emotional changes. This is normal. Knowing what to expect is important for your piece of mind and so you know when to seek help for a problem. Here are some of the most common symptoms:  · Morning sickness or nausea. This can happen any time of the day or night.  · Tender, swollen breasts  · Need to urinate frequently  · Tiredness or fatigue  · Dizziness  · Indigestion or heartburn  · Food cravings or turn-offs  · Constipation  · Emotional changes. This can range from anxiety to excitement to depression.  General care for a healthy pregnancy  Here are things you can do to help make sure your baby is born healthy:  · Rest when you feel tired. This is especially true in the later months of pregnancy.  · Drink more fluids. Your body needs more fluids than you may be used to. Drink 8 to10 glasses of juice, milk, or water every day.  · Eat well-balanced meals. Eat at regular times to give your body enough protein. You can expect to gain about 30 pounds during the pregnancy. Dont try to diet or lose weight while you are pregnant.  · Take a prenatal vitamin every day. This helps you meet the extra nutritional needs of pregnancy.  · Dont take any other medicine during your pregnancy unless your healthcare provider tells you to. This includes prescription medicines and those you buy over the counter. Many medicines can harm the growing baby.  · If you have nausea or vomiting, dont eat greasy or fried foods. Eat several smaller meals throughout the day rather than 3 large meals.  · If you smoke, you must stop. The nicotine you breathe in goes right to the baby.  · Stay away from alcohol, even in moderate amounts. Daily drinking will harm your baby and can cause permanent brain damage.  · Dont use recreational drugs, especially cocaine, crack, and heroin. These will harm  your baby. Also avoid marijuana.  · If you were using recreational drugs or prescribed medicine when you found out that you were pregnant, talk with your healthcare provider about possible effects on your growing baby.  · If you have medical problems that you need to take medicine for, talk with your healthcare provider.  Follow-up care  Call your healthcare provider to arrange for prenatal care. Prenatal care is important. You can see your family provider, a pregnancy specialist (obstetrician), or a primary care clinic.  When to seek medical advice  Call your healthcare provider right away if any of these occur:  · Vaginal bleeding  · Pain in your belly (abdomen) or back that is moderate or severe  · Lots of vomiting, or you cant keep any fluids down for 6 hours  · Burning feeling when you urinate  · Headache, dizziness, or rapid weight gain  · Fever  · Vision changes or blurred vision  Date Last Reviewed: 10/1/2016  © 2093-1060 [employer]. [address] [place] [address], [place] [address]. All rights reserved. This information is not intended as a substitute for professional medical care. Always follow your healthcare professional's instructions.

## 2019-10-02 LAB
ABO + RH BLD: NORMAL
BACTERIA #/AREA URNS AUTO: NORMAL /HPF
BACTERIA UR CULT: NO GROWTH
BILIRUB UR QL STRIP: NEGATIVE
BLD GP AB SCN CELLS X3 SERPL QL: NORMAL
C TRACH DNA SPEC QL NAA+PROBE: NOT DETECTED
CLARITY UR REFRACT.AUTO: CLEAR
COLOR UR AUTO: ABNORMAL
GLUCOSE UR QL STRIP: NEGATIVE
HBV SURFACE AG SERPL QL IA: NEGATIVE
HGB UR QL STRIP: NEGATIVE
HIV 1+2 AB+HIV1 P24 AG SERPL QL IA: NEGATIVE
KETONES UR QL STRIP: NEGATIVE
LEUKOCYTE ESTERASE UR QL STRIP: ABNORMAL
MICROSCOPIC COMMENT: NORMAL
N GONORRHOEA DNA SPEC QL NAA+PROBE: NOT DETECTED
NITRITE UR QL STRIP: NEGATIVE
PH UR STRIP: 7 [PH] (ref 5–8)
PROT UR QL STRIP: NEGATIVE
RBC #/AREA URNS AUTO: 1 /HPF (ref 0–4)
RPR SER QL: NORMAL
RUBV IGG SER-ACNC: 17.6 IU/ML
RUBV IGG SER-IMP: REACTIVE
SP GR UR STRIP: 1.01 (ref 1–1.03)
SQUAMOUS #/AREA URNS AUTO: 2 /HPF
URN SPEC COLLECT METH UR: ABNORMAL
WBC #/AREA URNS AUTO: 1 /HPF (ref 0–5)

## 2019-10-23 ENCOUNTER — PATIENT MESSAGE (OUTPATIENT)
Dept: ADMINISTRATIVE | Facility: OTHER | Age: 34
End: 2019-10-23

## 2019-10-23 ENCOUNTER — INITIAL PRENATAL (OUTPATIENT)
Dept: OBSTETRICS AND GYNECOLOGY | Facility: CLINIC | Age: 34
End: 2019-10-23
Payer: COMMERCIAL

## 2019-10-23 VITALS
WEIGHT: 136.44 LBS | DIASTOLIC BLOOD PRESSURE: 62 MMHG | BODY MASS INDEX: 25.78 KG/M2 | SYSTOLIC BLOOD PRESSURE: 106 MMHG

## 2019-10-23 DIAGNOSIS — Z87.59 HISTORY OF POSTPARTUM HEMORRHAGE: ICD-10-CM

## 2019-10-23 DIAGNOSIS — Z3A.11 11 WEEKS GESTATION OF PREGNANCY: Primary | ICD-10-CM

## 2019-10-23 DIAGNOSIS — Z87.59 HISTORY OF SHOULDER DYSTOCIA IN PRIOR PREGNANCY: ICD-10-CM

## 2019-10-23 PROBLEM — Z34.90 PREGNANCY: Status: ACTIVE | Noted: 2019-10-01

## 2019-10-23 PROCEDURE — 0500F INITIAL PRENATAL CARE VISIT: CPT | Mod: S$GLB,,, | Performed by: OBSTETRICS & GYNECOLOGY

## 2019-10-23 PROCEDURE — 99999 PR PBB SHADOW E&M-EST. PATIENT-LVL II: CPT | Mod: PBBFAC,,, | Performed by: OBSTETRICS & GYNECOLOGY

## 2019-10-23 PROCEDURE — 0500F PR INITIAL PRENATAL CARE VISIT: ICD-10-PCS | Mod: S$GLB,,, | Performed by: OBSTETRICS & GYNECOLOGY

## 2019-10-23 PROCEDURE — 99999 PR PBB SHADOW E&M-EST. PATIENT-LVL II: ICD-10-PCS | Mod: PBBFAC,,, | Performed by: OBSTETRICS & GYNECOLOGY

## 2019-10-23 NOTE — PATIENT INSTRUCTIONS
Comfort Tips During Pregnancy  Talk with your healthcare provider before using pain-relieving medicine at any time during your pregnancy.    First trimester tips  Nausea  · Get up slowly. Eat a few unsalted crackers before you get out of bed.  · Avoid smells that bother you.  · Eat small bland low fat, light high-protein meals at frequent intervals.  · Sip on water, weak tea, or clear soft drinks, like ginger ale. Eat ice chips.  Fatigue  · Take catnaps when you can.  · Get regular exercise.  · Accept help from others.  · Practice good sleep habits, like going to bed and getting up at the same time each day. Use your bed only for sleep and sex.  Mood swings  · Talk about your feelings with others, including other mothers.  · Limit sugar, chocolate, and caffeine.  · Eat a healthy diet. Dont skip meals.  · Get regular exercise.  Headaches  · Get fresh air and exercise.  · Relax and get enough rest.  · Check with your healthcare provider before taking any pain medicines.  Second trimester tips  Here are some suggestions to help you cope:  · To limit ankle swelling, sit with your feet raised or wear support hose.  · If you have pain in your groin and stomach (round ligament pain), avoid sudden twisting movements.  · For leg cramps, flexing your foot often brings immediate relief. You also may try massaging your calf in long, downward strokes, or stretching your legs before going to bed. Get enough exercise and wear shoes with flexible soles.  Third trimester tips  Reducing heartburn  · Eat small, light meals throughout the day rather than 3 large ones.  · Sleep with your upper body raised 6 inches. Dont lie down until 2 hours after you eat.  Treating constipation  · Eat foods high in fiber (whole-grain foods, fresh fruit and vegetables).  · Drink plenty of water.  · Get regular exercise.  · Discuss other medicines (like docusate and psyllium) with your healthcare provider.  Taking care of your breasts  · Avoid using  harsh soaps or alcohol, which can cause excessive dryness.  · Wear nursing bras. They provide more support than regular bras and can be used after pregnancy if you breastfeed.  Getting a good nights sleep  · Take a warm shower before bed.  · Sleep on a firm mattress.  · Lie on your side with 1 leg crossed over the other.  · Use pillows to support arms, legs, and belly.  Date Last Reviewed: 8/16/2015  © 1393-8309 [employer]. [address] [place] [address], [place] [address]. All rights reserved. This information is not intended as a substitute for professional medical care. Always follow your healthcare professional's instructions.

## 2019-10-23 NOTE — PROGRESS NOTES
"Presents for initial OB visit.  Saw me 3-4 weeks ago for pregnancy risk assessment.  To recap: "LMP 19.  She denies any vaginal bleeding or pelvic pain.  This is her second pregnancy.  She and her  are excited.  In , she had a term  with me.  Her delivery was complicated by a prolonged second stage, 2 maneuver shoulder dystocia, and postpartum hemorrhage due to uterine atony.  She and the baby ultimately did fine.  No brachial plexus injury, and she did not require PRBC's.  She is very nervous about another vaginal delivery, and has considered a  instead.  She and her  are still contemplating it.  They are certain this will be their last child."    Prenatal labs reviewed and normal.  Pregnancy dated by ultrasound 10/1/19 at 8w4d for JORGE of 2020.  Genetic testing options discussed, and she declines any genetic testing.  CDC and ACOG info on flu vaccine discussed.  Declines the flu vaccine.  Signed patient up for Connected MOM.  Bleeding and pain precautions reviewed.  Will discuss mode of delivery further along in gestation.  RTC 4 weeks for routine OB visit.  "

## 2019-11-20 ENCOUNTER — TELEPHONE (OUTPATIENT)
Dept: OBSTETRICS AND GYNECOLOGY | Facility: CLINIC | Age: 34
End: 2019-11-20

## 2019-11-22 ENCOUNTER — ROUTINE PRENATAL (OUTPATIENT)
Dept: OBSTETRICS AND GYNECOLOGY | Facility: CLINIC | Age: 34
End: 2019-11-22
Payer: COMMERCIAL

## 2019-11-22 ENCOUNTER — PATIENT MESSAGE (OUTPATIENT)
Dept: OBSTETRICS AND GYNECOLOGY | Facility: CLINIC | Age: 34
End: 2019-11-22

## 2019-11-22 VITALS
DIASTOLIC BLOOD PRESSURE: 76 MMHG | SYSTOLIC BLOOD PRESSURE: 118 MMHG | WEIGHT: 136.69 LBS | BODY MASS INDEX: 25.83 KG/M2

## 2019-11-22 DIAGNOSIS — Z87.59 HISTORY OF SHOULDER DYSTOCIA IN PRIOR PREGNANCY: ICD-10-CM

## 2019-11-22 DIAGNOSIS — Z36.3 ANTENATAL SCREENING FOR MALFORMATION USING ULTRASONICS: ICD-10-CM

## 2019-11-22 DIAGNOSIS — Z3A.16 16 WEEKS GESTATION OF PREGNANCY: Primary | ICD-10-CM

## 2019-11-22 DIAGNOSIS — Z87.59 HISTORY OF POSTPARTUM HEMORRHAGE: ICD-10-CM

## 2019-11-22 PROCEDURE — 99999 PR PBB SHADOW E&M-EST. PATIENT-LVL II: CPT | Mod: PBBFAC,,, | Performed by: OBSTETRICS & GYNECOLOGY

## 2019-11-22 PROCEDURE — 0502F SUBSEQUENT PRENATAL CARE: CPT | Mod: CPTII,S$GLB,, | Performed by: OBSTETRICS & GYNECOLOGY

## 2019-11-22 PROCEDURE — 0502F PR SUBSEQUENT PRENATAL CARE: ICD-10-PCS | Mod: CPTII,S$GLB,, | Performed by: OBSTETRICS & GYNECOLOGY

## 2019-11-22 PROCEDURE — 99999 PR PBB SHADOW E&M-EST. PATIENT-LVL II: ICD-10-PCS | Mod: PBBFAC,,, | Performed by: OBSTETRICS & GYNECOLOGY

## 2019-11-22 NOTE — PROGRESS NOTES
"Pt is doing well.  No complaints.  She has experienced pelvic and vaginal pressure after intercourse, but no bleeding or cramping.  Reassured her this can be normal in pregnancy.  Is travelling to [place] tomorrow for Thanksgiving.  Advised seatbelt use, and to stop every 2-3 hours to ambulate.  Declines Quad screen.  Tried to get Connected MOM supplies last visit, but they were out of scales.  Will try to  her supplies today.  Patient viewed [employer] video, Fall in Love and was provided with [facility] handouts: Benefits of Breastfeeding, "Exclusive Breastfeeding," and Skin to Skin with Your Baby. Discussed benefits of skin to skin, the magic first hour, delaying routine procedures, benefits of breastfeeding, and the importance of the first early feeding, and the importance of exclusive breastfeeding. Encouraged patient to attend [facility]s Prenatal Breastfeeding Class and to download the [employer] mobile rosa if she has not already done so.  Patient verbalizes understanding.    RTC 4weeks for routine OB visit with anatomy ultrasound.  "

## 2019-11-29 ENCOUNTER — PATIENT MESSAGE (OUTPATIENT)
Dept: OBSTETRICS AND GYNECOLOGY | Facility: CLINIC | Age: 34
End: 2019-11-29

## 2019-12-20 ENCOUNTER — PROCEDURE VISIT (OUTPATIENT)
Dept: OBSTETRICS AND GYNECOLOGY | Facility: CLINIC | Age: 34
End: 2019-12-20
Payer: COMMERCIAL

## 2019-12-20 ENCOUNTER — ROUTINE PRENATAL (OUTPATIENT)
Dept: OBSTETRICS AND GYNECOLOGY | Facility: CLINIC | Age: 34
End: 2019-12-20
Payer: COMMERCIAL

## 2019-12-20 ENCOUNTER — PATIENT MESSAGE (OUTPATIENT)
Dept: OBSTETRICS AND GYNECOLOGY | Facility: CLINIC | Age: 34
End: 2019-12-20

## 2019-12-20 VITALS
WEIGHT: 139.75 LBS | DIASTOLIC BLOOD PRESSURE: 62 MMHG | BODY MASS INDEX: 26.41 KG/M2 | SYSTOLIC BLOOD PRESSURE: 132 MMHG

## 2019-12-20 DIAGNOSIS — Z87.59 HISTORY OF POSTPARTUM HEMORRHAGE: ICD-10-CM

## 2019-12-20 DIAGNOSIS — Z36.3 ANTENATAL SCREENING FOR MALFORMATION USING ULTRASONICS: ICD-10-CM

## 2019-12-20 DIAGNOSIS — Z87.59 HISTORY OF SHOULDER DYSTOCIA IN PRIOR PREGNANCY: ICD-10-CM

## 2019-12-20 DIAGNOSIS — Z3A.20 20 WEEKS GESTATION OF PREGNANCY: Primary | ICD-10-CM

## 2019-12-20 PROCEDURE — 76805 PR US, OB 14+WKS, TRANSABD, SINGLE GESTATION: ICD-10-PCS | Mod: S$GLB,,, | Performed by: OBSTETRICS & GYNECOLOGY

## 2019-12-20 PROCEDURE — 99999 PR PBB SHADOW E&M-EST. PATIENT-LVL II: CPT | Mod: PBBFAC,,, | Performed by: OBSTETRICS & GYNECOLOGY

## 2019-12-20 PROCEDURE — 76805 OB US >/= 14 WKS SNGL FETUS: CPT | Mod: S$GLB,,, | Performed by: OBSTETRICS & GYNECOLOGY

## 2019-12-20 PROCEDURE — 0502F SUBSEQUENT PRENATAL CARE: CPT | Mod: CPTII,S$GLB,, | Performed by: OBSTETRICS & GYNECOLOGY

## 2019-12-20 PROCEDURE — 0502F PR SUBSEQUENT PRENATAL CARE: ICD-10-PCS | Mod: CPTII,S$GLB,, | Performed by: OBSTETRICS & GYNECOLOGY

## 2019-12-20 PROCEDURE — 99999 PR PBB SHADOW E&M-EST. PATIENT-LVL II: ICD-10-PCS | Mod: PBBFAC,,, | Performed by: OBSTETRICS & GYNECOLOGY

## 2019-12-20 NOTE — PROGRESS NOTES
Here for routine OB visit.  Other than some pelvic pressure with increased urinary frequency, she is doing well. No dysuria or hematuria    Urine dip: normal  Sono: growth and anatomy normal; brain views sub-opt because head is low in the pelvis; cervical length is normal.    Having another boy!  Baby's name will be Manpreet ZHONG 4 weeks for routine OB visit with f/u ultrasound

## 2020-01-20 ENCOUNTER — PROCEDURE VISIT (OUTPATIENT)
Dept: OBSTETRICS AND GYNECOLOGY | Facility: CLINIC | Age: 35
End: 2020-01-20
Payer: COMMERCIAL

## 2020-01-20 ENCOUNTER — ROUTINE PRENATAL (OUTPATIENT)
Dept: OBSTETRICS AND GYNECOLOGY | Facility: CLINIC | Age: 35
End: 2020-01-20
Payer: COMMERCIAL

## 2020-01-20 ENCOUNTER — PATIENT MESSAGE (OUTPATIENT)
Dept: OBSTETRICS AND GYNECOLOGY | Facility: CLINIC | Age: 35
End: 2020-01-20

## 2020-01-20 VITALS
BODY MASS INDEX: 27.16 KG/M2 | SYSTOLIC BLOOD PRESSURE: 118 MMHG | DIASTOLIC BLOOD PRESSURE: 76 MMHG | WEIGHT: 143.75 LBS

## 2020-01-20 DIAGNOSIS — Z3A.23 23 WEEKS GESTATION OF PREGNANCY: Primary | ICD-10-CM

## 2020-01-20 DIAGNOSIS — Z87.59 HISTORY OF POSTPARTUM HEMORRHAGE: ICD-10-CM

## 2020-01-20 DIAGNOSIS — Z87.59 HISTORY OF SHOULDER DYSTOCIA IN PRIOR PREGNANCY: Primary | ICD-10-CM

## 2020-01-20 DIAGNOSIS — Z87.59 HISTORY OF SHOULDER DYSTOCIA IN PRIOR PREGNANCY: ICD-10-CM

## 2020-01-20 PROCEDURE — 99999 PR PBB SHADOW E&M-EST. PATIENT-LVL II: ICD-10-PCS | Mod: PBBFAC,,, | Performed by: OBSTETRICS & GYNECOLOGY

## 2020-01-20 PROCEDURE — 76816 PR  US,PREGNANT UTERUS,F/U,TRANSABD APP: ICD-10-PCS | Mod: S$GLB,,, | Performed by: OBSTETRICS & GYNECOLOGY

## 2020-01-20 PROCEDURE — 99999 PR PBB SHADOW E&M-EST. PATIENT-LVL II: CPT | Mod: PBBFAC,,, | Performed by: OBSTETRICS & GYNECOLOGY

## 2020-01-20 PROCEDURE — 76816 OB US FOLLOW-UP PER FETUS: CPT | Mod: S$GLB,,, | Performed by: OBSTETRICS & GYNECOLOGY

## 2020-01-20 PROCEDURE — 0502F SUBSEQUENT PRENATAL CARE: CPT | Mod: CPTII,S$GLB,, | Performed by: OBSTETRICS & GYNECOLOGY

## 2020-01-20 PROCEDURE — 0502F PR SUBSEQUENT PRENATAL CARE: ICD-10-PCS | Mod: CPTII,S$GLB,, | Performed by: OBSTETRICS & GYNECOLOGY

## 2020-02-04 ENCOUNTER — PATIENT MESSAGE (OUTPATIENT)
Dept: OBSTETRICS AND GYNECOLOGY | Facility: CLINIC | Age: 35
End: 2020-02-04

## 2020-02-18 ENCOUNTER — PATIENT MESSAGE (OUTPATIENT)
Dept: OBSTETRICS AND GYNECOLOGY | Facility: CLINIC | Age: 35
End: 2020-02-18

## 2020-02-19 ENCOUNTER — ROUTINE PRENATAL (OUTPATIENT)
Dept: OBSTETRICS AND GYNECOLOGY | Facility: CLINIC | Age: 35
End: 2020-02-19
Payer: COMMERCIAL

## 2020-02-19 ENCOUNTER — LAB VISIT (OUTPATIENT)
Dept: LAB | Facility: HOSPITAL | Age: 35
End: 2020-02-19
Attending: OBSTETRICS & GYNECOLOGY
Payer: COMMERCIAL

## 2020-02-19 VITALS
WEIGHT: 147.06 LBS | DIASTOLIC BLOOD PRESSURE: 62 MMHG | SYSTOLIC BLOOD PRESSURE: 120 MMHG | BODY MASS INDEX: 27.78 KG/M2

## 2020-02-19 DIAGNOSIS — Z3A.28 28 WEEKS GESTATION OF PREGNANCY: Primary | ICD-10-CM

## 2020-02-19 DIAGNOSIS — Z87.59 HISTORY OF SHOULDER DYSTOCIA IN PRIOR PREGNANCY: ICD-10-CM

## 2020-02-19 DIAGNOSIS — Z3A.23 23 WEEKS GESTATION OF PREGNANCY: ICD-10-CM

## 2020-02-19 DIAGNOSIS — Z87.59 HISTORY OF POSTPARTUM HEMORRHAGE: ICD-10-CM

## 2020-02-19 LAB
BASOPHILS # BLD AUTO: 0.03 K/UL (ref 0–0.2)
BASOPHILS NFR BLD: 0.3 % (ref 0–1.9)
DIFFERENTIAL METHOD: ABNORMAL
EOSINOPHIL # BLD AUTO: 0.1 K/UL (ref 0–0.5)
EOSINOPHIL NFR BLD: 0.9 % (ref 0–8)
ERYTHROCYTE [DISTWIDTH] IN BLOOD BY AUTOMATED COUNT: 12.9 % (ref 11.5–14.5)
GLUCOSE SERPL-MCNC: 135 MG/DL (ref 70–140)
HCT VFR BLD AUTO: 39.3 % (ref 37–48.5)
HGB BLD-MCNC: 12.2 G/DL (ref 12–16)
IMM GRANULOCYTES # BLD AUTO: 0.05 K/UL (ref 0–0.04)
IMM GRANULOCYTES NFR BLD AUTO: 0.5 % (ref 0–0.5)
LYMPHOCYTES # BLD AUTO: 1.2 K/UL (ref 1–4.8)
LYMPHOCYTES NFR BLD: 13.3 % (ref 18–48)
MCH RBC QN AUTO: 29.3 PG (ref 27–31)
MCHC RBC AUTO-ENTMCNC: 31 G/DL (ref 32–36)
MCV RBC AUTO: 95 FL (ref 82–98)
MONOCYTES # BLD AUTO: 0.6 K/UL (ref 0.3–1)
MONOCYTES NFR BLD: 6.2 % (ref 4–15)
NEUTROPHILS # BLD AUTO: 7.2 K/UL (ref 1.8–7.7)
NEUTROPHILS NFR BLD: 78.8 % (ref 38–73)
NRBC BLD-RTO: 0 /100 WBC
PLATELET # BLD AUTO: 216 K/UL (ref 150–350)
PMV BLD AUTO: 12.1 FL (ref 9.2–12.9)
RBC # BLD AUTO: 4.16 M/UL (ref 4–5.4)
WBC # BLD AUTO: 9.19 K/UL (ref 3.9–12.7)

## 2020-02-19 PROCEDURE — 86703 HIV-1/HIV-2 1 RESULT ANTBDY: CPT

## 2020-02-19 PROCEDURE — 86592 SYPHILIS TEST NON-TREP QUAL: CPT

## 2020-02-19 PROCEDURE — 36415 COLL VENOUS BLD VENIPUNCTURE: CPT

## 2020-02-19 PROCEDURE — 99999 PR PBB SHADOW E&M-EST. PATIENT-LVL II: CPT | Mod: PBBFAC,,, | Performed by: OBSTETRICS & GYNECOLOGY

## 2020-02-19 PROCEDURE — 85025 COMPLETE CBC W/AUTO DIFF WBC: CPT

## 2020-02-19 PROCEDURE — 0502F SUBSEQUENT PRENATAL CARE: CPT | Mod: CPTII,S$GLB,, | Performed by: OBSTETRICS & GYNECOLOGY

## 2020-02-19 PROCEDURE — 82950 GLUCOSE TEST: CPT

## 2020-02-19 PROCEDURE — 0502F PR SUBSEQUENT PRENATAL CARE: ICD-10-PCS | Mod: CPTII,S$GLB,, | Performed by: OBSTETRICS & GYNECOLOGY

## 2020-02-19 PROCEDURE — 99999 PR PBB SHADOW E&M-EST. PATIENT-LVL II: ICD-10-PCS | Mod: PBBFAC,,, | Performed by: OBSTETRICS & GYNECOLOGY

## 2020-02-19 NOTE — PROGRESS NOTES
Here for routine OB visit.  Doing well.  Excellent fetal movement.  No labor symptoms.  Doing T3 labs today  Declines TDaP booster  L&D precautions reviewed.  RTC 2 weeks.

## 2020-02-20 LAB
HIV 1+2 AB+HIV1 P24 AG SERPL QL IA: NEGATIVE
RPR SER QL: NORMAL

## 2020-02-29 ENCOUNTER — PATIENT MESSAGE (OUTPATIENT)
Dept: OBSTETRICS AND GYNECOLOGY | Facility: CLINIC | Age: 35
End: 2020-02-29

## 2020-03-06 ENCOUNTER — PATIENT OUTREACH (OUTPATIENT)
Dept: ADMINISTRATIVE | Facility: OTHER | Age: 35
End: 2020-03-06

## 2020-03-10 ENCOUNTER — ROUTINE PRENATAL (OUTPATIENT)
Dept: OBSTETRICS AND GYNECOLOGY | Facility: CLINIC | Age: 35
End: 2020-03-10
Payer: COMMERCIAL

## 2020-03-10 VITALS
WEIGHT: 148.81 LBS | BODY MASS INDEX: 28.12 KG/M2 | DIASTOLIC BLOOD PRESSURE: 68 MMHG | SYSTOLIC BLOOD PRESSURE: 104 MMHG

## 2020-03-10 DIAGNOSIS — Z87.59 HISTORY OF POSTPARTUM HEMORRHAGE: ICD-10-CM

## 2020-03-10 DIAGNOSIS — Z3A.31 31 WEEKS GESTATION OF PREGNANCY: Primary | ICD-10-CM

## 2020-03-10 DIAGNOSIS — Z87.59 HISTORY OF SHOULDER DYSTOCIA IN PRIOR PREGNANCY: ICD-10-CM

## 2020-03-10 PROCEDURE — 99999 PR PBB SHADOW E&M-EST. PATIENT-LVL II: CPT | Mod: PBBFAC,,, | Performed by: OBSTETRICS & GYNECOLOGY

## 2020-03-10 PROCEDURE — 99999 PR PBB SHADOW E&M-EST. PATIENT-LVL II: ICD-10-PCS | Mod: PBBFAC,,, | Performed by: OBSTETRICS & GYNECOLOGY

## 2020-03-10 PROCEDURE — 0502F PR SUBSEQUENT PRENATAL CARE: ICD-10-PCS | Mod: CPTII,S$GLB,, | Performed by: OBSTETRICS & GYNECOLOGY

## 2020-03-10 PROCEDURE — 0502F SUBSEQUENT PRENATAL CARE: CPT | Mod: CPTII,S$GLB,, | Performed by: OBSTETRICS & GYNECOLOGY

## 2020-03-10 NOTE — PROGRESS NOTES
Doing well.  No complaints.  Had questions about COVID-19 precautions.  Reviewed hand hygiene, and avoiding exposure to sick contacts.  She still declines a flu vaccine.  Normal T3 labs.  L&D precautions.  RTC 2 weeks for routine OB visit

## 2020-03-11 ENCOUNTER — PATIENT MESSAGE (OUTPATIENT)
Dept: OBSTETRICS AND GYNECOLOGY | Facility: CLINIC | Age: 35
End: 2020-03-11

## 2020-03-12 ENCOUNTER — PATIENT OUTREACH (OUTPATIENT)
Dept: ADMINISTRATIVE | Facility: OTHER | Age: 35
End: 2020-03-12

## 2020-03-12 ENCOUNTER — ROUTINE PRENATAL (OUTPATIENT)
Dept: OBSTETRICS AND GYNECOLOGY | Facility: CLINIC | Age: 35
End: 2020-03-12
Payer: COMMERCIAL

## 2020-03-12 VITALS
SYSTOLIC BLOOD PRESSURE: 108 MMHG | WEIGHT: 143.31 LBS | BODY MASS INDEX: 27.08 KG/M2 | DIASTOLIC BLOOD PRESSURE: 66 MMHG

## 2020-03-12 DIAGNOSIS — O22.00 VARICOSE VEINS DURING PREGNANCY, ANTEPARTUM: Primary | ICD-10-CM

## 2020-03-12 DIAGNOSIS — Z3A.31 31 WEEKS GESTATION OF PREGNANCY: ICD-10-CM

## 2020-03-12 PROCEDURE — 0502F PR SUBSEQUENT PRENATAL CARE: ICD-10-PCS | Mod: CPTII,S$GLB,, | Performed by: MIDWIFE

## 2020-03-12 PROCEDURE — 0502F SUBSEQUENT PRENATAL CARE: CPT | Mod: CPTII,S$GLB,, | Performed by: MIDWIFE

## 2020-03-12 PROCEDURE — 99999 PR PBB SHADOW E&M-EST. PATIENT-LVL II: ICD-10-PCS | Mod: PBBFAC,,, | Performed by: MIDWIFE

## 2020-03-12 PROCEDURE — 99999 PR PBB SHADOW E&M-EST. PATIENT-LVL II: CPT | Mod: PBBFAC,,, | Performed by: MIDWIFE

## 2020-03-12 NOTE — PROGRESS NOTES
34 y.o. female  at 31w6d   Reports + FM, denies VB, LOF or CTX  Pt noticed what looks like a varicose vein on R labia, tender, 1 inch varicosity noted to upper R labia, no redness or tenderness around area, rec made  TWG: 10 lbs   Reviewed 28wk lab results (O POS)  Reviewed warning signs, normal FKCs,  labor precautions and how/when to call.  RTC x 2 wks, call or present sooner prn.

## 2020-03-18 ENCOUNTER — PATIENT MESSAGE (OUTPATIENT)
Dept: OBSTETRICS AND GYNECOLOGY | Facility: CLINIC | Age: 35
End: 2020-03-18

## 2020-03-24 ENCOUNTER — PATIENT OUTREACH (OUTPATIENT)
Dept: ADMINISTRATIVE | Facility: OTHER | Age: 35
End: 2020-03-24

## 2020-03-25 ENCOUNTER — OFFICE VISIT (OUTPATIENT)
Dept: OBSTETRICS AND GYNECOLOGY | Facility: CLINIC | Age: 35
End: 2020-03-25
Payer: COMMERCIAL

## 2020-03-25 ENCOUNTER — PATIENT MESSAGE (OUTPATIENT)
Dept: OBSTETRICS AND GYNECOLOGY | Facility: CLINIC | Age: 35
End: 2020-03-25

## 2020-03-25 VITALS — WEIGHT: 145 LBS | DIASTOLIC BLOOD PRESSURE: 76 MMHG | BODY MASS INDEX: 27.4 KG/M2 | SYSTOLIC BLOOD PRESSURE: 115 MMHG

## 2020-03-25 DIAGNOSIS — Z3A.33 33 WEEKS GESTATION OF PREGNANCY: Primary | ICD-10-CM

## 2020-03-25 DIAGNOSIS — Z87.59 HISTORY OF SHOULDER DYSTOCIA IN PRIOR PREGNANCY: ICD-10-CM

## 2020-03-25 DIAGNOSIS — Z87.59 HISTORY OF POSTPARTUM HEMORRHAGE: ICD-10-CM

## 2020-03-25 PROCEDURE — 0502F PR SUBSEQUENT PRENATAL CARE: ICD-10-PCS | Mod: CPTII,95,, | Performed by: OBSTETRICS & GYNECOLOGY

## 2020-03-25 PROCEDURE — 0502F SUBSEQUENT PRENATAL CARE: CPT | Mod: CPTII,95,, | Performed by: OBSTETRICS & GYNECOLOGY

## 2020-03-25 NOTE — PROGRESS NOTES
The patient location is: home  The chief complaint leading to consultation is: routine OB visit  Visit type: Virtual visit with synchronous audio and video  Total time spent with patient: 10 minutes  Each patient to whom he or she provides medical services by telemedicine is:  (1) informed of the relationship between the physician and patient and the respective role of any other health care provider with respect to management of the patient; and (2) notified that he or she may decline to receive medical services by telemedicine and may withdraw from such care at any time.    Notes: Presents via telemed for routine OB visit.  She feels well.  Good fetal movement.  No labor symptoms or VB.   Saw CNM on 3/12/20 for vulvar varicosity.  Has been applying ice packs to it, but it gets sore towards the end of the day.  I advised her to apply moist, warm compresses to it, and to try massaging the area.  Discussed compression undergarments to help as well.  Connected MOM bp, weight, and HR reviewed.  L&D precautions discussed.  RTC 2 weeks for in-office appt.  Will reexamine vulvar varicosity and do GBBS swab at her next appt.

## 2020-04-01 ENCOUNTER — TELEPHONE (OUTPATIENT)
Dept: OBSTETRICS AND GYNECOLOGY | Facility: CLINIC | Age: 35
End: 2020-04-01

## 2020-04-01 ENCOUNTER — PATIENT MESSAGE (OUTPATIENT)
Dept: OBSTETRICS AND GYNECOLOGY | Facility: CLINIC | Age: 35
End: 2020-04-01

## 2020-04-07 ENCOUNTER — PATIENT OUTREACH (OUTPATIENT)
Dept: ADMINISTRATIVE | Facility: OTHER | Age: 35
End: 2020-04-07

## 2020-04-09 ENCOUNTER — ROUTINE PRENATAL (OUTPATIENT)
Dept: OBSTETRICS AND GYNECOLOGY | Facility: CLINIC | Age: 35
End: 2020-04-09
Payer: COMMERCIAL

## 2020-04-09 VITALS — SYSTOLIC BLOOD PRESSURE: 118 MMHG | WEIGHT: 149.25 LBS | DIASTOLIC BLOOD PRESSURE: 68 MMHG | BODY MASS INDEX: 28.2 KG/M2

## 2020-04-09 DIAGNOSIS — Z3A.35 35 WEEKS GESTATION OF PREGNANCY: Primary | ICD-10-CM

## 2020-04-09 PROCEDURE — 99999 PR PBB SHADOW E&M-EST. PATIENT-LVL II: CPT | Mod: PBBFAC,,, | Performed by: MIDWIFE

## 2020-04-09 PROCEDURE — 87081 CULTURE SCREEN ONLY: CPT

## 2020-04-09 PROCEDURE — 0502F PR SUBSEQUENT PRENATAL CARE: ICD-10-PCS | Mod: CPTII,S$GLB,, | Performed by: MIDWIFE

## 2020-04-09 PROCEDURE — 0502F SUBSEQUENT PRENATAL CARE: CPT | Mod: CPTII,S$GLB,, | Performed by: MIDWIFE

## 2020-04-09 PROCEDURE — 99999 PR PBB SHADOW E&M-EST. PATIENT-LVL II: ICD-10-PCS | Mod: PBBFAC,,, | Performed by: MIDWIFE

## 2020-04-09 NOTE — PROGRESS NOTES
34 y.o. female  at 35w6d  Reports + FM, denies VB, LOF or regular CTX  Doing well without concerns.  Right labial varicosity greatly improved since not standing on her feet all day.   Connected Mom reviewed   TW lbs   GBS collected today, VE per pt request  Discussed plans for delivery. Would like to go natural, has been considering tub birth and has been watching natural birth classes.   Discussed recent coronavirus changes. Pt aware of pause on circumcisions. She would like circumcision for her son if able after del.   The skin of the suprapubic region was evaluated and appears clean, dry, and intact.  Counseled the patient to shower daily and to wash this area with an antibacterial soap such as Dial daily.  Advised her to not shave the hair from this area from now until after delivery.  I also counseled the patient to place antibacterial hand soap in all her bathrooms and kitchen at home to help facilitate proper hand hygiene practices before and after delivery.   Reviewed warning signs, normal FKCs, labor precautions and how/when to call.  RTC x 2 wks, call or present sooner prn.

## 2020-04-11 LAB — BACTERIA SPEC AEROBE CULT: NORMAL

## 2020-04-19 ENCOUNTER — PATIENT OUTREACH (OUTPATIENT)
Dept: ADMINISTRATIVE | Facility: OTHER | Age: 35
End: 2020-04-19

## 2020-04-20 ENCOUNTER — PATIENT MESSAGE (OUTPATIENT)
Dept: OBSTETRICS AND GYNECOLOGY | Facility: CLINIC | Age: 35
End: 2020-04-20

## 2020-04-21 ENCOUNTER — PATIENT MESSAGE (OUTPATIENT)
Dept: OBSTETRICS AND GYNECOLOGY | Facility: CLINIC | Age: 35
End: 2020-04-21

## 2020-04-21 ENCOUNTER — ROUTINE PRENATAL (OUTPATIENT)
Dept: OBSTETRICS AND GYNECOLOGY | Facility: CLINIC | Age: 35
End: 2020-04-21
Payer: COMMERCIAL

## 2020-04-21 VITALS — WEIGHT: 151.69 LBS | BODY MASS INDEX: 28.66 KG/M2 | SYSTOLIC BLOOD PRESSURE: 92 MMHG | DIASTOLIC BLOOD PRESSURE: 60 MMHG

## 2020-04-21 DIAGNOSIS — Z3A.37 37 WEEKS GESTATION OF PREGNANCY: Primary | ICD-10-CM

## 2020-04-21 DIAGNOSIS — Z87.59 HISTORY OF POSTPARTUM HEMORRHAGE: ICD-10-CM

## 2020-04-21 DIAGNOSIS — Z87.59 HISTORY OF SHOULDER DYSTOCIA IN PRIOR PREGNANCY: ICD-10-CM

## 2020-04-21 PROCEDURE — 99999 PR PBB SHADOW E&M-EST. PATIENT-LVL II: ICD-10-PCS | Mod: PBBFAC,,, | Performed by: OBSTETRICS & GYNECOLOGY

## 2020-04-21 PROCEDURE — 0502F SUBSEQUENT PRENATAL CARE: CPT | Mod: CPTII,S$GLB,, | Performed by: OBSTETRICS & GYNECOLOGY

## 2020-04-21 PROCEDURE — 99999 PR PBB SHADOW E&M-EST. PATIENT-LVL II: CPT | Mod: PBBFAC,,, | Performed by: OBSTETRICS & GYNECOLOGY

## 2020-04-21 PROCEDURE — 0502F PR SUBSEQUENT PRENATAL CARE: ICD-10-PCS | Mod: CPTII,S$GLB,, | Performed by: OBSTETRICS & GYNECOLOGY

## 2020-04-21 NOTE — PROGRESS NOTES
Doing well.  Has some BH ctxns, and has episodes where her abdomen will feel really hard for awhile.  No VB or LOF.  Good FM.  Patient desires NCB.  Connected MOM bp reviewed.  Discussed that we are now implementing testing for COVID 19 on all patients that get admitted to the hospital.  If you test positive, we will likely delay your delivery 7 days unless something else is going on that would warrant delivery.  This is to give enough time for the virus to stop replicating so there would be less active virus in nasal secretions, and thus help reduce likelihood of transmission to the baby after birth or to others.  If we have to proceed with delivery before then, then you will be placed in a designated area on labor and delivery for COVID 19 positive moms.  Also, if you test positive, the baby will get tested.  The American Academy of Pediatrics recommends  mom and baby if you're positive, but our maternal fetal medicine specialists have been in conference with pediatrics, and at this time, we are giving moms the choice to either separate or room together.  If you're positive, and you opt to room together with the baby, you will have to wear a mask the entire time.  The baby will need to be placed 6 feet away from you, ideally behind a screen.  If you plan to breastfeed, we are recommending that you pump the milk and give the baby expressed milk.  If you choose to latch the baby to the breast, please know that this increases the likelihood of transmission of the virus to the baby, and you'd have to wear a mask and make sure to use thorough hand and breast hygiene.  Reviewed that circumcisions are now being done again if mom is negative for COVID 19.  L&D and preeclampsia precautions reviewed.  RTC 1 week.

## 2020-04-26 PROBLEM — O36.8190 DECREASED FETAL MOVEMENT: Status: ACTIVE | Noted: 2020-04-26

## 2020-04-28 ENCOUNTER — PATIENT MESSAGE (OUTPATIENT)
Dept: OBSTETRICS AND GYNECOLOGY | Facility: CLINIC | Age: 35
End: 2020-04-28

## 2020-04-28 ENCOUNTER — PATIENT OUTREACH (OUTPATIENT)
Dept: ADMINISTRATIVE | Facility: OTHER | Age: 35
End: 2020-04-28

## 2020-04-28 ENCOUNTER — HOSPITAL ENCOUNTER (INPATIENT)
Facility: HOSPITAL | Age: 35
LOS: 2 days | Discharge: HOME | End: 2020-04-30
Attending: OBSTETRICS & GYNECOLOGY | Admitting: OBSTETRICS & GYNECOLOGY
Payer: COMMERCIAL

## 2020-04-28 DIAGNOSIS — O42.90 AMNIOTIC FLUID LEAKING: ICD-10-CM

## 2020-04-28 PROBLEM — O36.8190 DECREASED FETAL MOVEMENT: Status: RESOLVED | Noted: 2020-04-26 | Resolved: 2020-04-28

## 2020-04-28 LAB
ABO + RH BLD: NORMAL
BASOPHILS # BLD AUTO: 0.04 K/UL (ref 0–0.2)
BASOPHILS NFR BLD: 0.4 % (ref 0–1.9)
BLD GP AB SCN CELLS X3 SERPL QL: NORMAL
DIFFERENTIAL METHOD: ABNORMAL
EOSINOPHIL # BLD AUTO: 0.1 K/UL (ref 0–0.5)
EOSINOPHIL NFR BLD: 0.6 % (ref 0–8)
ERYTHROCYTE [DISTWIDTH] IN BLOOD BY AUTOMATED COUNT: 12.7 % (ref 11.5–14.5)
HCT VFR BLD AUTO: 36.3 % (ref 37–48.5)
HGB BLD-MCNC: 11.6 G/DL (ref 12–16)
IMM GRANULOCYTES # BLD AUTO: 0.06 K/UL (ref 0–0.04)
IMM GRANULOCYTES NFR BLD AUTO: 0.6 % (ref 0–0.5)
LYMPHOCYTES # BLD AUTO: 1.5 K/UL (ref 1–4.8)
LYMPHOCYTES NFR BLD: 14.5 % (ref 18–48)
MCH RBC QN AUTO: 28.6 PG (ref 27–31)
MCHC RBC AUTO-ENTMCNC: 32 G/DL (ref 32–36)
MCV RBC AUTO: 89 FL (ref 82–98)
MONOCYTES # BLD AUTO: 0.7 K/UL (ref 0.3–1)
MONOCYTES NFR BLD: 6.7 % (ref 4–15)
NEUTROPHILS # BLD AUTO: 8.2 K/UL (ref 1.8–7.7)
NEUTROPHILS NFR BLD: 77.2 % (ref 38–73)
NRBC BLD-RTO: 0 /100 WBC
PLATELET # BLD AUTO: 230 K/UL (ref 150–350)
PMV BLD AUTO: 12 FL (ref 9.2–12.9)
RBC # BLD AUTO: 4.06 M/UL (ref 4–5.4)
SARS-COV-2 RDRP RESP QL NAA+PROBE: NEGATIVE
WBC # BLD AUTO: 10.59 K/UL (ref 3.9–12.7)

## 2020-04-28 PROCEDURE — 72100003 HC LABOR CARE, EA. ADDL. 8 HRS

## 2020-04-28 PROCEDURE — 85025 COMPLETE CBC W/AUTO DIFF WBC: CPT

## 2020-04-28 PROCEDURE — 11000001 HC ACUTE MED/SURG PRIVATE ROOM

## 2020-04-28 PROCEDURE — 86901 BLOOD TYPING SEROLOGIC RH(D): CPT

## 2020-04-28 PROCEDURE — 63600175 PHARM REV CODE 636 W HCPCS: Performed by: MIDWIFE

## 2020-04-28 PROCEDURE — U0002 COVID-19 LAB TEST NON-CDC: HCPCS

## 2020-04-28 RX ORDER — BUTORPHANOL TARTRATE 2 MG/ML
1 INJECTION INTRAMUSCULAR; INTRAVENOUS
Status: DISCONTINUED | OUTPATIENT
Start: 2020-04-28 | End: 2020-04-29

## 2020-04-28 RX ORDER — SIMETHICONE 80 MG
1 TABLET,CHEWABLE ORAL 4 TIMES DAILY PRN
Status: DISCONTINUED | OUTPATIENT
Start: 2020-04-28 | End: 2020-04-29

## 2020-04-28 RX ORDER — SODIUM CHLORIDE, SODIUM LACTATE, POTASSIUM CHLORIDE, CALCIUM CHLORIDE 600; 310; 30; 20 MG/100ML; MG/100ML; MG/100ML; MG/100ML
INJECTION, SOLUTION INTRAVENOUS CONTINUOUS
Status: DISCONTINUED | OUTPATIENT
Start: 2020-04-28 | End: 2020-04-29

## 2020-04-28 RX ORDER — ONDANSETRON 8 MG/1
8 TABLET, ORALLY DISINTEGRATING ORAL EVERY 8 HOURS PRN
Status: DISCONTINUED | OUTPATIENT
Start: 2020-04-28 | End: 2020-04-29

## 2020-04-28 RX ORDER — MISOPROSTOL 200 UG/1
600 TABLET ORAL
Status: DISCONTINUED | OUTPATIENT
Start: 2020-04-28 | End: 2020-04-29

## 2020-04-28 RX ORDER — OXYTOCIN/RINGER'S LACTATE 30/500 ML
2 PLASTIC BAG, INJECTION (ML) INTRAVENOUS CONTINUOUS
Status: DISCONTINUED | OUTPATIENT
Start: 2020-04-28 | End: 2020-04-29

## 2020-04-28 RX ORDER — OXYTOCIN/RINGER'S LACTATE 30/500 ML
333 PLASTIC BAG, INJECTION (ML) INTRAVENOUS CONTINUOUS
Status: ACTIVE | OUTPATIENT
Start: 2020-04-28 | End: 2020-04-28

## 2020-04-28 RX ORDER — SODIUM CHLORIDE 9 MG/ML
INJECTION, SOLUTION INTRAVENOUS
Status: DISCONTINUED | OUTPATIENT
Start: 2020-04-28 | End: 2020-04-29

## 2020-04-28 RX ORDER — CALCIUM CARBONATE 200(500)MG
500 TABLET,CHEWABLE ORAL 3 TIMES DAILY PRN
Status: DISCONTINUED | OUTPATIENT
Start: 2020-04-28 | End: 2020-04-29

## 2020-04-28 RX ORDER — OXYTOCIN/RINGER'S LACTATE 30/500 ML
41.7 PLASTIC BAG, INJECTION (ML) INTRAVENOUS CONTINUOUS
Status: ACTIVE | OUTPATIENT
Start: 2020-04-28 | End: 2020-04-28

## 2020-04-28 RX ADMIN — SODIUM CHLORIDE, SODIUM LACTATE, POTASSIUM CHLORIDE, AND CALCIUM CHLORIDE 125 ML/HR: .6; .31; .03; .02 INJECTION, SOLUTION INTRAVENOUS at 06:04

## 2020-04-28 RX ADMIN — Medication 2 MILLI-UNITS/MIN: at 06:04

## 2020-04-28 NOTE — HOSPITAL COURSE
"Admit to L&D for management of labor  SVE 3-70/-2  VSSAF  Strongly desires to avoid Pitocin at this time, encouraged to walk and pump  Would like NCB  1700: cervix unchanged, will start Pitocin  0515: epidural in place, 9 cm, OP position  Patient's labor progressed well.  On 20, she had an uncomplicated  of a male infant (baby "Ceht").   cc.  Periurethral laceration noted and repaired with 3-0 chromic running.  She and the baby were transferred to [facility] for routine postpartum care.  20- PPD 1.5- doing well, routine postpartum orders   20-PPD.5- pt requesting discharge home. Infant discharged. Discharge instructions given.  "

## 2020-04-28 NOTE — H&P
[facility] -   Obstetrics  History & Physical    Patient Name: Julienne Rosa  MRN: [id number]  Admission Date: 2020  Primary Care Provider: MICHELLE Dillard MD    Subjective:     Principal Problem:Amniotic fluid leaking    History of Present Illness:   38.4 to L&D with c/o SROM of clear fluid @ 0300    Obstetric HPI:  Patient reports mild contractions, active fetal movement, No vaginal bleeding , Yes loss of fluid. Patient strongly desires to avoid Pitocin due to bad experience with first baby. States will walk and pump. Aware of risks of prolonged ROM.     This pregnancy has been complicated by Hx of PP hemorrhage, & Hx of shoulder dystocia.    OB History    Para Term  AB Living   2 1 1 0 0 1   SAB TAB Ectopic Multiple Live Births   0 0 0 0 1      # Outcome Date GA Lbr Will/2nd Weight Sex Delivery Anes PTL Lv   2 Current            1 Term 11/24/15 39w3d / 01:59 3.29 kg (7 lb 4.1 oz) M Vag-Spont EPI N PEYTON      Complications: Shoulder Dystocia      Apgar1: 7  Apgar5: 9     Past Medical History:   Diagnosis Date    Abnormal Pap smear of vagina     x 2-had 2 normal colposcopies//paps have been normal since    Mental disorder     depression and anxiety    Seasonal allergic rhinitis due to pollen 2019     Past Surgical History:   Procedure Laterality Date    MOUTH SURGERY      tonsilectomy         PTA Medications   Medication Sig    prenatal vits62/FA/om3/dha/epa (PRENATAL GUMMY ORAL)        Review of patient's allergies indicates:   Allergen Reactions    Ceclor [cefaclor] Other (See Comments)     unknown    Augmentin [amoxicillin-pot clavulanate] Hives    Sulfa (sulfonamide antibiotics) Rash        Family History     Problem Relation (Age of Onset)    No Known Problems Paternal Grandfather, Paternal Grandmother, Maternal Grandmother, Maternal Grandfather, Father, Mother, Brother, Sister        Tobacco Use    Smoking status: Never Smoker    Smokeless tobacco: Never  Used   Substance and Sexual Activity    Alcohol use: Not Currently     Frequency: 2-4 times a month     Drinks per session: 1 or 2     Binge frequency: Never     Comment: socially    Drug use: No    Sexual activity: Yes     Partners: Male     Review of Systems   Constitutional: Negative.    Eyes: Negative.    Respiratory: Negative.    Cardiovascular: Negative.    Gastrointestinal: Positive for abdominal pain.        Abdominal pain consistent with ctx   Genitourinary: Positive for vaginal discharge. Negative for vaginal bleeding.        Reports SROM at 0300. Fluid clear, & odorless.    Neurological: Negative.       Objective:     Vital Signs (Most Recent):  Temp: 98.2 °F (36.8 °C) (20 1232)  Pulse: 105 (20 1307)  Resp: 18 (20 1232)  BP: 104/83 (20 1303)  SpO2: 96 % (20 1307) Vital Signs (24h Range):  Temp:  [98.2 °F (36.8 °C)] 98.2 °F (36.8 °C)  Pulse:  [] 105  Resp:  [18] 18  SpO2:  [96 %] 96 %  BP: (104-119)/(76-83) 104/83     Weight: 68.5 kg (151 lb)  Body mass index is 28.53 kg/m².    FHT: Cat 1 (reassuring)  TOCO:  Q 3-5 minutes, mild    Physical Exam    Cervix:  Dilation:  3-4  Effacement:  75%  Station: -2  Presentation: Vertex   Per RN exam    Clear, odorless fluid.     Significant Labs:  Lab Results   Component Value Date    GROUPTRH O POS 10/01/2019    HEPBSAG Negative 10/01/2019    STREPBCULT No Group B Streptococcus isolated 2020       I have personallly reviewed all pertinent lab results from the last 24 hours.    Assessment/Plan:     34 y.o. female  at 38w4d for:    * Amniotic fluid leaking  Expectant mangement at this time  Will recheck cervix at 1700: patient agreeable to Pitocin if minimal to no cervical change    History of postpartum hemorrhage  Pitocin after delivery of baby        Day Jones CNM  Obstetrics  [facility] -

## 2020-04-28 NOTE — SUBJECTIVE & OBJECTIVE
Obstetric HPI:  Patient reports mild contractions, active fetal movement, No vaginal bleeding , Yes loss of fluid. Patient strongly desires to avoid Pitocin due to bad experience with first baby. States will walk and pump. Aware of risks of prolonged ROM.     This pregnancy has been complicated by Hx of PP hemorrhage, & Hx of shoulder dystocia.    OB History    Para Term  AB Living   2 1 1 0 0 1   SAB TAB Ectopic Multiple Live Births   0 0 0 0 1      # Outcome Date GA Lbr Will/2nd Weight Sex Delivery Anes PTL Lv   2 Current            1 Term 11/24/15 39w3d / 01:59 3.29 kg (7 lb 4.1 oz) M Vag-Spont EPI N PEYTON      Complications: Shoulder Dystocia      Apgar1: 7  Apgar5: 9     Past Medical History:   Diagnosis Date    Abnormal Pap smear of vagina     x 2-had 2 normal colposcopies//paps have been normal since    Mental disorder     depression and anxiety    Seasonal allergic rhinitis due to pollen 2019     Past Surgical History:   Procedure Laterality Date    MOUTH SURGERY      tonsilectomy         PTA Medications   Medication Sig    prenatal vits62/FA/om3/dha/epa (PRENATAL GUMMY ORAL)        Review of patient's allergies indicates:   Allergen Reactions    Ceclor [cefaclor] Other (See Comments)     unknown    Augmentin [amoxicillin-pot clavulanate] Hives    Sulfa (sulfonamide antibiotics) Rash        Family History     Problem Relation (Age of Onset)    No Known Problems Paternal Grandfather, Paternal Grandmother, Maternal Grandmother, Maternal Grandfather, Father, Mother, Brother, Sister        Tobacco Use    Smoking status: Never Smoker    Smokeless tobacco: Never Used   Substance and Sexual Activity    Alcohol use: Not Currently     Frequency: 2-4 times a month     Drinks per session: 1 or 2     Binge frequency: Never     Comment: socially    Drug use: No    Sexual activity: Yes     Partners: Male     Review of Systems   Constitutional: Negative.    Eyes: Negative.    Respiratory:  Negative.    Cardiovascular: Negative.    Gastrointestinal: Positive for abdominal pain.        Abdominal pain consistent with ctx   Genitourinary: Positive for vaginal discharge. Negative for vaginal bleeding.        Reports SROM at 0300. Fluid clear, & odorless.    Neurological: Negative.       Objective:     Vital Signs (Most Recent):  Temp: 98.2 °F (36.8 °C) (04/28/20 1232)  Pulse: 105 (04/28/20 1307)  Resp: 18 (04/28/20 1232)  BP: 104/83 (04/28/20 1303)  SpO2: 96 % (04/28/20 1307) Vital Signs (24h Range):  Temp:  [98.2 °F (36.8 °C)] 98.2 °F (36.8 °C)  Pulse:  [] 105  Resp:  [18] 18  SpO2:  [96 %] 96 %  BP: (104-119)/(76-83) 104/83     Weight: 68.5 kg (151 lb)  Body mass index is 28.53 kg/m².    FHT: Cat 1 (reassuring)  TOCO:  Q 3-5 minutes, mild    Physical Exam    Cervix:  Dilation:  3-4  Effacement:  75%  Station: -2  Presentation: Vertex   Per RN exam    Clear, odorless fluid.     Significant Labs:  Lab Results   Component Value Date    GROUPTRH O POS 10/01/2019    HEPBSAG Negative 10/01/2019    STREPBCULT No Group B Streptococcus isolated 04/09/2020       I have personallly reviewed all pertinent lab results from the last 24 hours.

## 2020-04-28 NOTE — PROGRESS NOTES

## 2020-04-28 NOTE — ASSESSMENT & PLAN NOTE
Cervix unchanged  Options discussed with patient  Agrees to start Pitocin, will increase every 30 minutes as patient still desires NCB

## 2020-04-28 NOTE — PROGRESS NOTES
[facility] -   Obstetrics  Labor Progress Note    Patient Name: Julienne Rosa  MRN: [id number]  Admission Date: 2020  Hospital Length of Stay: 0 days  Attending Physician: Marina Gonzales MD  Primary Care Provider: MICHELLE Dillard MD    Subjective:     Principal Problem:Amniotic fluid leaking    Hospital Course:  Admit to L&D for management of labor  SVE 3-4/70/-2  VSSAF  Strongly desires to avoid Pitocin at this time, encouraged to walk and pump  Would like NCB  1700: cervix unchanged, will start Pitocin    Interval History:  Julienne is a 34 y.o.  at 38w4d. She is doing well. States contractions have not increased in frequency or strength.     Objective:     Vital Signs (Most Recent):  Temp: 98.2 °F (36.8 °C) (20 1532)  Pulse: 93 (20 1532)  Resp: 18 (20 1232)  BP: 104/83 (20 1303)  SpO2: 97 % (20 1532) Vital Signs (24h Range):  Temp:  [98.2 °F (36.8 °C)] 98.2 °F (36.8 °C)  Pulse:  [] 93  Resp:  [18] 18  SpO2:  [96 %-97 %] 97 %  BP: (104-119)/(76-83) 104/83     Weight: 68.5 kg (151 lb)  Body mass index is 28.53 kg/m².    FHT: Cat 1 (reassuring)  TOCO:  Q 3-7 minutes    Cervical Exam:  Dilation:  3-4  Effacement:  75%  Station: -2  Presentation: Vertex     Significant Labs:  Lab Results   Component Value Date    GROUPTRH O POS 2020    HEPBSAG Negative 10/01/2019    STREPBCULT No Group B Streptococcus isolated 2020       I have personallly reviewed all pertinent lab results from the last 24 hours.    Physical Exam    Assessment/Plan:     34 y.o. female  at 38w4d for:    * Amniotic fluid leaking  Cervix unchanged  Options discussed with patient  Agrees to start Pitocin, will increase every 30 minutes as patient still desires NCB    History of postpartum hemorrhage  Pitocin after delivery of baby          Day Jones CNM  Obstetrics  [facility] [place]

## 2020-04-28 NOTE — SUBJECTIVE & OBJECTIVE
Interval History:  Julienne is a 34 y.o.  at 38w4d. She is doing well. States contractions have not increased in frequency or strength.     Objective:     Vital Signs (Most Recent):  Temp: 98.2 °F (36.8 °C) (20 1532)  Pulse: 93 (20 1532)  Resp: 18 (20 1232)  BP: 104/83 (20 1303)  SpO2: 97 % (20 1532) Vital Signs (24h Range):  Temp:  [98.2 °F (36.8 °C)] 98.2 °F (36.8 °C)  Pulse:  [] 93  Resp:  [18] 18  SpO2:  [96 %-97 %] 97 %  BP: (104-119)/(76-83) 104/83     Weight: 68.5 kg (151 lb)  Body mass index is 28.53 kg/m².    FHT: Cat 1 (reassuring)  TOCO:  Q 3-7 minutes    Cervical Exam:  Dilation:  3-4  Effacement:  75%  Station: -2  Presentation: Vertex     Significant Labs:  Lab Results   Component Value Date    GROUPTRH O POS 2020    HEPBSAG Negative 10/01/2019    STREPBCULT No Group B Streptococcus isolated 2020       I have personallly reviewed all pertinent lab results from the last 24 hours.    Physical Exam

## 2020-04-28 NOTE — ASSESSMENT & PLAN NOTE
Expectant mangement at this time  Will recheck cervix at 1700: patient agreeable to Pitocin if minimal to no cervical change

## 2020-04-29 ENCOUNTER — ANESTHESIA EVENT (OUTPATIENT)
Dept: OBSTETRICS AND GYNECOLOGY | Facility: HOSPITAL | Age: 35
End: 2020-04-29
Payer: COMMERCIAL

## 2020-04-29 ENCOUNTER — ANESTHESIA (OUTPATIENT)
Dept: OBSTETRICS AND GYNECOLOGY | Facility: HOSPITAL | Age: 35
End: 2020-04-29
Payer: COMMERCIAL

## 2020-04-29 PROBLEM — O42.90 AMNIOTIC FLUID LEAKING: Status: RESOLVED | Noted: 2020-04-28 | Resolved: 2020-04-29

## 2020-04-29 PROBLEM — Z34.90 PREGNANCY: Status: RESOLVED | Noted: 2019-10-01 | Resolved: 2020-04-29

## 2020-04-29 PROBLEM — O42.90 PROLONGED RUPTURE OF MEMBRANES: Status: RESOLVED | Noted: 2020-04-29 | Resolved: 2020-04-29

## 2020-04-29 PROBLEM — O42.90 PROLONGED RUPTURE OF MEMBRANES: Status: ACTIVE | Noted: 2020-04-29

## 2020-04-29 PROCEDURE — 59400 PR FULL ROUT OBSTE CARE,VAGINAL DELIV: ICD-10-PCS | Mod: AT,,, | Performed by: OBSTETRICS & GYNECOLOGY

## 2020-04-29 PROCEDURE — 63600175 PHARM REV CODE 636 W HCPCS: Performed by: NURSE ANESTHETIST, CERTIFIED REGISTERED

## 2020-04-29 PROCEDURE — 72200005 HC VAGINAL DELIVERY LEVEL II

## 2020-04-29 PROCEDURE — 25000003 PHARM REV CODE 250: Performed by: OBSTETRICS & GYNECOLOGY

## 2020-04-29 PROCEDURE — 72100003 HC LABOR CARE, EA. ADDL. 8 HRS

## 2020-04-29 PROCEDURE — 25000003 PHARM REV CODE 250: Performed by: NURSE ANESTHETIST, CERTIFIED REGISTERED

## 2020-04-29 PROCEDURE — 27200710 HC EPIDURAL INFUSION PUMP SET: Performed by: ANESTHESIOLOGY

## 2020-04-29 PROCEDURE — 11000001 HC ACUTE MED/SURG PRIVATE ROOM

## 2020-04-29 PROCEDURE — 59400 OBSTETRICAL CARE: CPT | Mod: AT,,, | Performed by: OBSTETRICS & GYNECOLOGY

## 2020-04-29 PROCEDURE — 27800516 HC TRAY, EPIDURAL COMBO: Performed by: ANESTHESIOLOGY

## 2020-04-29 PROCEDURE — 51701 INSERT BLADDER CATHETER: CPT

## 2020-04-29 PROCEDURE — 63600175 PHARM REV CODE 636 W HCPCS: Performed by: MIDWIFE

## 2020-04-29 PROCEDURE — 51702 INSERT TEMP BLADDER CATH: CPT

## 2020-04-29 PROCEDURE — 62326 NJX INTERLAMINAR LMBR/SAC: CPT | Performed by: NURSE ANESTHETIST, CERTIFIED REGISTERED

## 2020-04-29 RX ORDER — FENTANYL CITRATE 50 UG/ML
INJECTION, SOLUTION INTRAMUSCULAR; INTRAVENOUS
Status: DISCONTINUED | OUTPATIENT
Start: 2020-04-29 | End: 2020-04-29

## 2020-04-29 RX ORDER — AMMONIA 15 % (W/V)
0.3 AMPUL (EA) INHALATION CONTINUOUS PRN
Status: DISCONTINUED | OUTPATIENT
Start: 2020-04-29 | End: 2020-04-30 | Stop reason: HOSPADM

## 2020-04-29 RX ORDER — HYDROCORTISONE 25 MG/G
CREAM TOPICAL 3 TIMES DAILY PRN
Status: DISCONTINUED | OUTPATIENT
Start: 2020-04-29 | End: 2020-04-30 | Stop reason: HOSPADM

## 2020-04-29 RX ORDER — PRENATAL WITH FERROUS FUM AND FOLIC ACID 3080; 920; 120; 400; 22; 1.84; 3; 20; 10; 1; 12; 200; 27; 25; 2 [IU]/1; [IU]/1; MG/1; [IU]/1; MG/1; MG/1; MG/1; MG/1; MG/1; MG/1; UG/1; MG/1; MG/1; MG/1; MG/1
1 TABLET ORAL DAILY
Status: DISCONTINUED | OUTPATIENT
Start: 2020-04-29 | End: 2020-04-30 | Stop reason: HOSPADM

## 2020-04-29 RX ORDER — ROPIVACAINE HYDROCHLORIDE 2 MG/ML
INJECTION, SOLUTION EPIDURAL; INFILTRATION; PERINEURAL CONTINUOUS PRN
Status: DISCONTINUED | OUTPATIENT
Start: 2020-04-29 | End: 2020-04-29

## 2020-04-29 RX ORDER — OXYTOCIN/RINGER'S LACTATE 30/500 ML
41.65 PLASTIC BAG, INJECTION (ML) INTRAVENOUS CONTINUOUS
Status: ACTIVE | OUTPATIENT
Start: 2020-04-29 | End: 2020-04-29

## 2020-04-29 RX ORDER — DOCUSATE SODIUM 100 MG/1
100 CAPSULE, LIQUID FILLED ORAL DAILY
Status: DISCONTINUED | OUTPATIENT
Start: 2020-04-29 | End: 2020-04-30 | Stop reason: HOSPADM

## 2020-04-29 RX ORDER — ACETAMINOPHEN 325 MG/1
650 TABLET ORAL EVERY 6 HOURS PRN
Status: DISCONTINUED | OUTPATIENT
Start: 2020-04-29 | End: 2020-04-30 | Stop reason: HOSPADM

## 2020-04-29 RX ORDER — LIDOCAINE HCL/EPINEPHRINE/PF 2%-1:200K
VIAL (ML) INJECTION
Status: DISCONTINUED | OUTPATIENT
Start: 2020-04-29 | End: 2020-04-29

## 2020-04-29 RX ORDER — ROPIVACAINE HYDROCHLORIDE 5 MG/ML
INJECTION, SOLUTION EPIDURAL; INFILTRATION; PERINEURAL
Status: DISCONTINUED | OUTPATIENT
Start: 2020-04-29 | End: 2020-04-29

## 2020-04-29 RX ORDER — ONDANSETRON 8 MG/1
8 TABLET, ORALLY DISINTEGRATING ORAL EVERY 8 HOURS PRN
Status: DISCONTINUED | OUTPATIENT
Start: 2020-04-29 | End: 2020-04-30 | Stop reason: HOSPADM

## 2020-04-29 RX ORDER — IBUPROFEN 600 MG/1
600 TABLET ORAL EVERY 6 HOURS
Status: DISCONTINUED | OUTPATIENT
Start: 2020-04-29 | End: 2020-04-30 | Stop reason: HOSPADM

## 2020-04-29 RX ORDER — DIPHENHYDRAMINE HCL 25 MG
25 CAPSULE ORAL EVERY 4 HOURS PRN
Status: DISCONTINUED | OUTPATIENT
Start: 2020-04-29 | End: 2020-04-30 | Stop reason: HOSPADM

## 2020-04-29 RX ORDER — HYDROCODONE BITARTRATE AND ACETAMINOPHEN 7.5; 325 MG/1; MG/1
1 TABLET ORAL EVERY 4 HOURS PRN
Status: DISCONTINUED | OUTPATIENT
Start: 2020-04-29 | End: 2020-04-30 | Stop reason: HOSPADM

## 2020-04-29 RX ORDER — LIDOCAINE HYDROCHLORIDE AND EPINEPHRINE 15; 5 MG/ML; UG/ML
INJECTION, SOLUTION EPIDURAL
Status: DISCONTINUED | OUTPATIENT
Start: 2020-04-29 | End: 2020-04-29

## 2020-04-29 RX ORDER — HYDROCODONE BITARTRATE AND ACETAMINOPHEN 5; 325 MG/1; MG/1
1 TABLET ORAL EVERY 4 HOURS PRN
Status: DISCONTINUED | OUTPATIENT
Start: 2020-04-29 | End: 2020-04-30 | Stop reason: HOSPADM

## 2020-04-29 RX ORDER — ROPIVACAINE HYDROCHLORIDE 2 MG/ML
INJECTION, SOLUTION EPIDURAL; INFILTRATION; PERINEURAL
Status: DISCONTINUED | OUTPATIENT
Start: 2020-04-29 | End: 2020-04-29

## 2020-04-29 RX ADMIN — SODIUM CHLORIDE, SODIUM LACTATE, POTASSIUM CHLORIDE, AND CALCIUM CHLORIDE 1000 ML: .6; .31; .03; .02 INJECTION, SOLUTION INTRAVENOUS at 01:04

## 2020-04-29 RX ADMIN — IBUPROFEN 600 MG: 600 TABLET, FILM COATED ORAL at 09:04

## 2020-04-29 RX ADMIN — SODIUM CHLORIDE, SODIUM LACTATE, POTASSIUM CHLORIDE, AND CALCIUM CHLORIDE: .6; .31; .03; .02 INJECTION, SOLUTION INTRAVENOUS at 01:04

## 2020-04-29 RX ADMIN — ROPIVACAINE HYDROCHLORIDE 5 ML: 2 INJECTION, SOLUTION EPIDURAL; INFILTRATION at 01:04

## 2020-04-29 RX ADMIN — LIDOCAINE HYDROCHLORIDE,EPINEPHRINE BITARTRATE 3 ML: 15; .005 INJECTION, SOLUTION EPIDURAL; INFILTRATION; INTRACAUDAL; PERINEURAL at 01:04

## 2020-04-29 RX ADMIN — HYDROCODONE BITARTRATE AND ACETAMINOPHEN 1 TABLET: 5; 325 TABLET ORAL at 09:04

## 2020-04-29 RX ADMIN — IBUPROFEN 600 MG: 600 TABLET, FILM COATED ORAL at 03:04

## 2020-04-29 RX ADMIN — HYDROCODONE BITARTRATE AND ACETAMINOPHEN 1 TABLET: 5; 325 TABLET ORAL at 04:04

## 2020-04-29 RX ADMIN — FENTANYL CITRATE 100 MCG: 50 INJECTION, SOLUTION INTRAMUSCULAR; INTRAVENOUS at 05:04

## 2020-04-29 RX ADMIN — HYDROCODONE BITARTRATE AND ACETAMINOPHEN 1 TABLET: 5; 325 TABLET ORAL at 12:04

## 2020-04-29 RX ADMIN — ROPIVACAINE HYDROCHLORIDE 7 ML: 5 INJECTION, SOLUTION EPIDURAL; INFILTRATION; PERINEURAL at 05:04

## 2020-04-29 RX ADMIN — LIDOCAINE HYDROCHLORIDE,EPINEPHRINE BITARTRATE 5 ML: 20; .005 INJECTION, SOLUTION EPIDURAL; INFILTRATION; INTRACAUDAL; PERINEURAL at 04:04

## 2020-04-29 RX ADMIN — ROPIVACAINE HYDROCHLORIDE 14 ML/HR: 2 INJECTION, SOLUTION EPIDURAL; INFILTRATION at 01:04

## 2020-04-29 NOTE — PROGRESS NOTES
[facility] -   Obstetrics  Labor Progress Note    Patient Name: Julienne Rosa  MRN: [id number]  Admission Date: 2020  Hospital Length of Stay: 1 days  Attending Physician: Marina Gonzales MD  Primary Care Provider: MICHELLE Dillard MD    Subjective:     Principal Problem:Amniotic fluid leaking    Hospital Course:  Admit to L&D for management of labor  SVE 3-4/70/-2  VSSAF  Strongly desires to avoid Pitocin at this time, encouraged to walk and pump  Would like NCB  1700: cervix unchanged, will start Pitocin  0515: epidural in place, 9 cm, OP position    Interval History:  Julienne is a 34 y.o.  at 38w5d. Epidural in place, reports pain to left side. Epidural being managed by anesthesia.     Objective:     Vital Signs (Most Recent):  Temp: 97.7 °F (36.5 °C) (20 0503)  Pulse: 99 (20 0602)  Resp: 20 (20 1900)  BP: 116/73 (20 0602)  SpO2: 100 % (20 0602) Vital Signs (24h Range):  Temp:  [97.7 °F (36.5 °C)-98.6 °F (37 °C)] 97.7 °F (36.5 °C)  Pulse:  [] 99  Resp:  [18-20] 20  SpO2:  [96 %-100 %] 100 %  BP: ()/(60-93) 116/73     Weight: 68.5 kg (151 lb)  Body mass index is 28.53 kg/m².    FHT: Cat 1 (reassuring)  TOCO:  Q 2-3 minutes    Cervical Exam:  Dilation:  9  Effacement:  100%  Station: 0  Presentation: VTX, OP position      Significant Labs:  Lab Results   Component Value Date    GROUPTRH O POS 2020    HEPBSAG Negative 10/01/2019    STREPBCULT No Group B Streptococcus isolated 2020       I have personallly reviewed all pertinent lab results from the last 24 hours.    Physical Exam    Assessment/Plan:     34 y.o. female  at 38w5d for:    * Amniotic fluid leaking  Epidural in place  9/100%/0, baby in OP position  Peanut ball placed  Pitocin infusing   Remains afebrile, VS stable    Prolonged rupture of membranes  Afebrile, VS stable  FHT's reassuring  Pitocin infusing  GBS neg  Continue with POC  Anticipate     History of  postpartum hemorrhage  Pitocin after delivery of baby          Day Jones CNM  Obstetrics  [facility] [place]

## 2020-04-29 NOTE — ANESTHESIA POSTPROCEDURE EVALUATION
Anesthesia Post Evaluation    Patient: Julienne Rosa    Procedure(s) Performed: * No procedures listed *    Final Anesthesia Type: epidural    Patient location during evaluation: labor & delivery  Patient participation: Yes- Able to Participate  Level of consciousness: awake and alert and awake  Post-procedure vital signs: reviewed and stable  Pain management: adequate  Airway patency: patent    PONV status at discharge: No PONV  Anesthetic complications: no      Cardiovascular status: blood pressure returned to baseline and hemodynamically stable  Respiratory status: unassisted and spontaneous ventilation  Hydration status: euvolemic  Follow-up not needed.          Vitals Value Taken Time   /81 4/29/2020  8:33 AM   Temp 36.5 °C (97.7 °F) 4/29/2020  7:00 AM   Pulse 102 4/29/2020  8:37 AM   Resp 20 4/28/2020  7:00 PM   SpO2 100 % 4/29/2020  8:37 AM   Vitals shown include unvalidated device data.      No case tracking events are documented in the log.      Pain/Mary Kay Score: No data recorded

## 2020-04-29 NOTE — ANESTHESIA PROCEDURE NOTES
Epidural    Patient location during procedure: OB   Reason for block: primary anesthetic   Diagnosis: iup,labor    Start time: 4/29/2020 1:28 AM  Timeout: 4/29/2020 1:28 AM  End time: 4/29/2020 1:57 AM    Staffing  Performing Provider: Nain Block Jr., CRNA  Authorizing Provider: Adrien Novak MD        Preanesthetic Checklist  Completed: patient identified, site marked, surgical consent, pre-op evaluation, timeout performed, IV checked, risks and benefits discussed, monitors and equipment checked, anesthesia consent given, hand hygiene performed and patient being monitored  Preparation  Patient position: sitting  Prep: Betadine  Patient monitoring: Pulse Ox and Blood Pressure  Epidural  Skin Anesthetic: lidocaine 1%  Skin Wheal: 3 mL  Administration type: continuous  Approach: midline  Interspace: L3-4    Injection technique: HUY air  Needle and Epidural Catheter  Needle type: Tuohy   Needle gauge: 17  Needle length: 3.5 inches  Needle insertion depth: 5 cm  Catheter type: springwound and multi-orifice  Catheter size: 18 G  Catheter at skin depth: 12 cm  Test dose: 3 mL of lidocaine 1.5% with Epi 1-to-200,000  Additional Documentation: incremental injection, negative aspiration for heme and CSF, no paresthesia on injection, no signs/symptoms of IV or SA injection, no significant pain on injection and no significant complaints from patient  Needle localization: anatomical landmarks  Medications:  Volume per aspiration: 0 mL  Time between aspirations: 2 minutes  Assessment  Upper dermatomal levels - Left: T10  Right: T9  Ease of block: easy  Patient's tolerance of the procedure: comfortable throughout block and no complaintsNo inadvertent dural puncture with Tuohy.  Dural puncture not performed with spinal needle

## 2020-04-29 NOTE — SUBJECTIVE & OBJECTIVE
Interval History:  Julienne is a 34 y.o.  at 38w5d. Epidural in place, reports pain to left side. Epidural being managed by anesthesia.     Objective:     Vital Signs (Most Recent):  Temp: 97.7 °F (36.5 °C) (20 0503)  Pulse: 99 (20 0602)  Resp: 20 (20 1900)  BP: 116/73 (20 0602)  SpO2: 100 % (20 0602) Vital Signs (24h Range):  Temp:  [97.7 °F (36.5 °C)-98.6 °F (37 °C)] 97.7 °F (36.5 °C)  Pulse:  [] 99  Resp:  [18-20] 20  SpO2:  [96 %-100 %] 100 %  BP: ()/(60-93) 116/73     Weight: 68.5 kg (151 lb)  Body mass index is 28.53 kg/m².    FHT: Cat 1 (reassuring)  TOCO:  Q 2-3 minutes    Cervical Exam:  Dilation:  9  Effacement:  100%  Station: 0  Presentation: VTX, OP position      Significant Labs:  Lab Results   Component Value Date    GROUPTRH O POS 2020    HEPBSAG Negative 10/01/2019    STREPBCULT No Group B Streptococcus isolated 2020       I have personallly reviewed all pertinent lab results from the last 24 hours.    Physical Exam

## 2020-04-29 NOTE — ANESTHESIA PREPROCEDURE EVALUATION
04/29/2020  Julienne Rosa is a 34 y.o., female.    Anesthesia Evaluation    I have reviewed the Patient Summary Reports.    I have reviewed the Nursing Notes.   I have reviewed the Medications.     Review of Systems  Anesthesia Hx:  No previous Anesthesia    Social:  Non-Smoker    Hematology/Oncology:  Hematology Normal   Oncology Normal     EENT/Dental:EENT/Dental Normal   Cardiovascular:   Exercise tolerance: good NYHA Classification I    Pulmonary:  Pulmonary Normal    Renal/:  Renal/ Normal     Hepatic/GI:  Hepatic/GI Normal    Musculoskeletal:  Musculoskeletal Normal    Neurological:  Neurology Normal    Endocrine:  Endocrine Normal    Dermatological:  Skin Normal    Psych:   Psychiatric History          Physical Exam  General:  Well nourished    Airway/Jaw/Neck:  Airway Findings: Mouth Opening: Normal Tongue: Normal  General Airway Assessment: Adult  Mallampati: II  Improves to I with phonation.  TM Distance: Normal, at least 6 cm        Eyes/Ears/Nose:  EYES/EARS/NOSE FINDINGS: Normal   Dental:  DENTAL FINDINGS: Normal   Chest/Lungs:  Chest/Lungs Findings: Clear to auscultation, Normal Respiratory Rate     Heart/Vascular:  Heart Findings: Rate: Normal  Rhythm: Regular Rhythm     Abdomen:  Abdomen Findings: Normal    Musculoskeletal:  Musculoskeletal Findings: Normal   Skin:  Skin Findings: Normal    Mental Status:  Mental Status Findings:  Cooperative, Alert and Oriented         Anesthesia Plan  Type of Anesthesia, risks & benefits discussed:  Anesthesia Type:  epidural  Patient's Preference:   Intra-op Monitoring Plan:   Intra-op Monitoring Plan Comments:   Post Op Pain Control Plan: multimodal analgesia  Post Op Pain Control Plan Comments:   Induction:    Beta Blocker:  Patient is not currently on a Beta-Blocker (No further documentation required).       Informed Consent: Patient  understands risks and agrees with Anesthesia plan.  Questions answered. Anesthesia consent signed with patient.  ASA Score: 2     Day of Surgery Review of History & Physical:    H&P update referred to the surgeon.         Ready For Surgery From Anesthesia Perspective.

## 2020-04-29 NOTE — PLAN OF CARE
38.5 weeks gestation, SROM on 04/28/2020 at 0300, on pitocin, epidural in place, VSS, afebrile. Last exam 9/100/0, peanut ball in use. Will continue to monitor.

## 2020-04-29 NOTE — LACTATION NOTE
"Lactation rounds  Mother is about to feed Chet. Assisted mother into a football position on the left breast. Upon close inspection, noted that the left nipple is dimpled.     Mother independently latched baby in a deep asymmetric latch on the left breast. She complains of pain as soon as baby latched on. Latch seems adequate. Pain sustain within a few minutes. Unlatched baby to look at nipple: shape and color is WDL, nipple is now everted.     Changed positioning on the right breast, still football hold. Mother independently latched baby on- deep latch easily obtained, mother denies pain or discomfort.     Impression:     Dimpled nipple is a form of nipple inversion in which the tissue inside the fold can become adhered.     The adhered skin skin is very fragile compared to skin that receives a normal amount of light, air and friction from clothing. When the nipple folds back on itself after breastfeeding or pumping, moisture can be trapped in the dimple and cause maceration.     Over time, and with repeated exposure to light and air, this tissue can become less fragile although good hygiene remains important in order to keep area clean of debris.     Recommendation:   Nipple shells (for inverted and sore nipples) brought at bedside and both sizes tried on to marcella nipple and let it air dry. Mother states that the "sore" nipple is a better fit for her needs.     Reviewed use for breast shells and nipple care reviewed.       "

## 2020-04-29 NOTE — LACTATION NOTE
Lactation rounds  Lactation admit information reviewed. Mother verbalizes understanding of expected  behaviors and output for the first 48 hours of life.  Discussed the importance of cue based feedings on demand, unrestricted access to the breast, and frequent uninterrupted skin to skin contact.  Risk and implications of artificial nipples and non medically indicated formula supplementation discussed.  Encouraged mother to call for assistance when desired or when infant is showing signs of hunger. Mother verbalizes understanding of all education and counseling.

## 2020-04-29 NOTE — NURSING
Pt assisted to BR for 1st void, assisted by 2 RN's.  Pt voided 600 cc's.  Bleeding light.  Erin care teaching done.  Pt verbalized understanding.  Pt back in bed.  Call light in reach.  Will continue to monitor.

## 2020-04-29 NOTE — ASSESSMENT & PLAN NOTE
Epidural in place  9/100%/0, baby in OP position  Peanut ball placed  Pitocin infusing   Remains afebrile, VS stable

## 2020-04-29 NOTE — L&D DELIVERY NOTE
[facility] -   Vaginal Delivery   Operative Note    SUMMARY     Normal spontaneous vaginal delivery of live infant, was placed on mothers abdomen for skin to skin and bulb suctioning performed.  Infant delivered position LOUIE over intact perineum.  Nuchal cord: Yes, cord reduced at perineum.    Spontaneous delivery of placenta and IV pitocin given noting good uterine tone.  periurethral laceration noted repaired with 3-0 chromic running suture..  Patient tolerated delivery well. Sponge needle and lap counted correctly x2.    Indications: Amniotic fluid leaking  Pregnancy complicated by:   Patient Active Problem List   Diagnosis    Pregnancy    History of postpartum hemorrhage    History of shoulder dystocia in prior pregnancy    Amniotic fluid leaking    Prolonged rupture of membranes     Admitting GA: 38w5d    Delivery Information for Luis M Rosa    Birth information:  YOB: 2020   Time of birth: 7:32 AM   Sex: male   Head Delivery Date/Time: 4/29/2020  7:32 AM   Delivery type: Vaginal, Spontaneous   Gestational Age: 38w5d    Delivery Providers    Delivering clinician:  Margot Bond MD   Provider Role    Fany Gregorio RN Registered Nurse    Lala Green RN Registered Nurse    Ronaldo Sandoval CNM Midwife            Measurements    Weight:  3470 g  Length:           Apgars    Living status:  Living  Apgars:   1 min.:   5 min.:   10 min.:   15 min.:   20 min.:     Skin color:   0  1       Heart rate:   2  2       Reflex irritability:   2  2       Muscle tone:   2  2       Respiratory effort:   2  2       Total:   8  9       Apgars assigned by:  JOCE GREEN RN         Operative Delivery    Forceps attempted?:  No  Vacuum extractor attempted?:  No         Shoulder Dystocia    Shoulder dystocia present?:  No           Presentation    Presentation:  Vertex           Interventions/Resuscitation    Method:  Bulb Suctioning, Tactile Stimulation, Deep Suctioning       Cord     No data filed        Placenta    Placenta delivery date/time:  2020 0738  Placenta removal:  Spontaneous  Placenta appearance:  Intact  Placenta disposition:  discarded           Labor Events:       labor: No     Labor Onset Date/Time:         Dilation Complete Date/Time:         Start Pushing Date/Time:         Start Pushing Date/Time:       Rupture Date/Time: 20  0306         Rupture type: spontaneous rupture of membranes         Fluid Amount: Moderate      Fluid Color: Clear      Fluid Odor:        Membrane Status: SRM (Spontaneous Rupture)               steroids: None     Antibiotics given for GBS: No     Induction: oxytocin     Indications for induction:        Augmentation:       Indications for augmentation:       Labor complications: None     Additional complications:          Cervical ripening:                     Delivery:      Episiotomy: None     Indication for Episiotomy:       Perineal Lacerations:   Repaired:      Periurethral Laceration:   Repaired:     Labial Laceration:   Repaired:     Sulcus Laceration:   Repaired:     Vaginal Laceration:   Repaired:     Cervical Laceration:   Repaired:     Repair suture:       Repair # of packets:       Last Value - EBL - Nursing (mL):       Sum - EBL - Nursing (mL): 0     Last Value - EBL - Anesthesia (mL):      Calculated QBL (mL):        Vaginal Sweep Performed:       Surgicount Correct:         Other providers:       Anesthesia    Method:  Epidural          Details (if applicable):  Trial of Labor      Categorization:      Priority:     Indications for :     Incision Type:       Additional  information:  Forceps:    Vacuum:    Breech:    Observed anomalies    Other (Comments):

## 2020-04-29 NOTE — PROGRESS NOTES
04/29/20 0031   [facility] Note - Communication   [place] Nurse Communicated with Bedside Nurse Regarding: Fetal Status   [facility] Note - Notification   Nurse Notified? Yes  (called unit and spoke with Hope RE FHR tracing and pulse ox, RN at bedside, pt standing up and rocking for comfort measures during labor)   Name of Nurse Nellie   Doctor Notified? No

## 2020-04-30 VITALS
HEIGHT: 61 IN | RESPIRATION RATE: 18 BRPM | DIASTOLIC BLOOD PRESSURE: 73 MMHG | TEMPERATURE: 98 F | BODY MASS INDEX: 28.51 KG/M2 | SYSTOLIC BLOOD PRESSURE: 115 MMHG | HEART RATE: 84 BPM | OXYGEN SATURATION: 99 % | WEIGHT: 151 LBS

## 2020-04-30 PROCEDURE — 25000003 PHARM REV CODE 250: Performed by: OBSTETRICS & GYNECOLOGY

## 2020-04-30 RX ORDER — IBUPROFEN 600 MG/1
600 TABLET ORAL EVERY 6 HOURS
Qty: 30 TABLET | Refills: 0 | Status: SHIPPED | OUTPATIENT
Start: 2020-04-30 | End: 2020-05-13

## 2020-04-30 RX ORDER — HYDROCODONE BITARTRATE AND ACETAMINOPHEN 5; 325 MG/1; MG/1
1 TABLET ORAL EVERY 4 HOURS PRN
Qty: 10 TABLET | Refills: 0 | Status: SHIPPED | OUTPATIENT
Start: 2020-04-30 | End: 2020-05-13

## 2020-04-30 RX ADMIN — PRENATAL VITAMINS-IRON FUMARATE 27 MG IRON-FOLIC ACID 0.8 MG TABLET 1 TABLET: at 09:04

## 2020-04-30 RX ADMIN — DOCUSATE SODIUM 100 MG: 100 CAPSULE, LIQUID FILLED ORAL at 09:04

## 2020-04-30 RX ADMIN — IBUPROFEN 600 MG: 600 TABLET, FILM COATED ORAL at 03:04

## 2020-04-30 RX ADMIN — IBUPROFEN 600 MG: 600 TABLET, FILM COATED ORAL at 09:04

## 2020-04-30 RX ADMIN — HYDROCODONE BITARTRATE AND ACETAMINOPHEN 1 TABLET: 5; 325 TABLET ORAL at 07:04

## 2020-04-30 NOTE — SUBJECTIVE & OBJECTIVE
"Hospital course: Admit to L&D for management of labor  SVE 3-/-2  VSSAF  Strongly desires to avoid Pitocin at this time, encouraged to walk and pump  Would like NCB  1700: cervix unchanged, will start Pitocin  0515: epidural in place, 9 cm, OP position  Patient's labor progressed well.  On 20, she had an uncomplicated  of a male infant (baby "Chet").   cc.  Periurethral laceration noted and repaired with 3-0 chromic running.  She and the baby were transferred to [facility] for routine postpartum care.  20- PPD 1.5- doing well, routine postpartum orders     Interval History:     She is doing well this morning. She is tolerating a regular diet without nausea or vomiting. She is voiding spontaneously. She is ambulating. Vaginal bleeding is mild. She denies fever or chills. Abdominal pain is mild and controlled with oral medications. She is breastfeeding. She desires circumcision for her male baby: yes.    Objective:     Vital Signs (Most Recent):  Temp: 97.9 °F (36.6 °C) (20 0738)  Pulse: 84 (20 0738)  Resp: 18 (20 0738)  BP: 115/73 (20 0738)  SpO2: 99 % (20 0830) Vital Signs (24h Range):  Temp:  [97.7 °F (36.5 °C)-97.9 °F (36.6 °C)] 97.9 °F (36.6 °C)  Pulse:  [73-90] 84  Resp:  [18] 18  BP: (106-141)/(67-88) 115/73     Weight: 68.5 kg (151 lb)  Body mass index is 28.53 kg/m².      Intake/Output Summary (Last 24 hours) at 2020 0850  Last data filed at 2020 1302  Gross per 24 hour   Intake --   Output 1500 ml   Net -1500 ml       Significant Labs:  Lab Results   Component Value Date    GROUPTRH O POS 2020    HEPBSAG Negative 10/01/2019    STREPBCULT No Group B Streptococcus isolated 2020     Recent Labs   Lab 20  1250   HGB 11.6*   HCT 36.3*       I have personallly reviewed all pertinent lab results from the last 24 hours.    Physical Exam:   Constitutional: She is oriented to person, place, and time. She appears well-developed and " well-nourished.    HENT:   Head: Normocephalic.    Eyes: Conjunctivae are normal.    Neck: Normal range of motion.     Pulmonary/Chest: Effort normal.        Abdominal: Soft. Bowel sounds are normal.     Genitourinary: Vagina normal and uterus normal.           Musculoskeletal: Normal range of motion.       Neurological: She is alert and oriented to person, place, and time.    Skin: Skin is warm and dry.    Psychiatric: She has a normal mood and affect. Her behavior is normal. Judgment and thought content normal.

## 2020-04-30 NOTE — LACTATION NOTE
Lactation Rounds:    Infant weight loss -1.2%. 13 breastfeeding in the last 24 hours. Infant is cluster feeding. 2 voids 1 stool.  Father reports 4 stools.      Infant was circumcised this morning. Mother reports infant last fed at 7 am. Importance of feeding infant 8-12 times in 24 hours discussed as well as how to wake a sleepy baby. Ongoing education provided including correct positioning and latch, signs of an effective feeding, early feeding cues and baby-led feeds, frequency of feeds including the normality of cluster feeding, hand expression, exclusive breastfeeding for 6 months, as well as when and  how to seek the assistance of a qualified health care professional for concerns related to  feeding.      Mother requested for inspection of infants mouth. Tight labial frenum noted and blanching noted to gumline when gently flanged out infants upper lip. Unable to visualize under tongue at this time. Mother instructed to follow up with pediatrician about concerns of oral restrictions and discussed feeding team options if she chooses. Instructed to call warmline if she would like to make an appointment.     Infant unswaddled and placed in mothers arms. Baby is showing feeding cues. Helped mother to settle in a football hold position on the right breast. Reviewed deep asymmetric latch and proper positioning. Mother is able to demonstrate back after several attempts and adequate latch obtained. Audible swallows noted with breast massage and compression. Infant requires frequent stimulation to remain engaged in feeding. Mother denies pain or discomfort. Infant nursed for approximatley 10 minutes and nipple shape and color is WDL upon unlatching.     Reviewed hand expression and nipple care. Mother is able to return demonstrate on bilateral breast.     Infant placed in football hold on left breast. Mother has been wearing a soft shell for inverted nipples on the left side. She reports left nipple is inverted.  Nipple appears everted with healing skin on the nipple. Nipple care recommendations given.    Infant appears more awake. Very active rooting.  Mother independently latched infant . Mother is grimacing in pain. She rates her pain 9/10. Readjusted latch and position, mother rates her pain 5/10. Upper and lower lips rolled inward, rolled lips outward. After a few more mins of nursing mother rates her pain 0/10. Mother used breast massage and compression. Audible swallows noted. Infant nursed for 15 minutes and released breast. Nipple appears to look the same.     Mother burped infant, he is still rooting around. Mother indecpendently latched infant in football hold on right breast. Audibel swallows noted. Mother denies pain with latch. Feeding in progress.      Education given on cluster feeding and to do skin to skin as much as possible. Offer breast frequently, frequent feedings and jaundice discussed. Mother verbalized understanding.     Breastfeeding discharge education performed. Informed mother of the World Health Organization's recommendation for exclusive breastfeeding for the first 6 months of baby's life and continued breastfeeding after the introduction of solid foods for 2 years and beyond. Also informed mother of the American Academy of Pediatrics' recommendation for baby to be examined by pediatrician or other qualified HCP within 2-4 dys of discharge and again at the 2nd week of life. Discussed baby's appropriate intake and output, adequate weight gain patterns for baby, and how to seek the assistance of a qualified healthcare professional for concerns related to  feeding. Written instructions have been provided and were reviewed at this time. Mother voices understanding.    Mother denies any further lactation needs or concerns at this time. Mother anticipates discharge home today.   Mother is aware of warm line and outpatient consultations. Encouraged mother to contact lactation with any  questions, concerns, or problems. Contact numbers provided, and mother verbalizes understanding. Mother reminded of virtual visits as well.

## 2020-04-30 NOTE — PROGRESS NOTES
"[facility] -   Obstetrics  Postpartum Progress Note    Patient Name: Julienne Rosa  MRN: [id number]  Admission Date: 2020  Hospital Length of Stay: 2 days  Attending Physician: Marina Gonzales MD  Primary Care Provider: MICHELLE Dillard MD    Subjective:     Principal Problem: (normal spontaneous vaginal delivery)    Hospital course: Admit to L&D for management of labor  SVE 3-/-2  VSSAF  Strongly desires to avoid Pitocin at this time, encouraged to walk and pump  Would like NCB  1700: cervix unchanged, will start Pitocin  0515: epidural in place, 9 cm, OP position  Patient's labor progressed well.  On 20, she had an uncomplicated  of a male infant (baby "Chet").   cc.  Periurethral laceration noted and repaired with 3-0 chromic running.  She and the baby were transferred to [facility] for routine postpartum care.  20- PPD 1.5- doing well, routine postpartum orders     Interval History:     She is doing well this morning. She is tolerating a regular diet without nausea or vomiting. She is voiding spontaneously. She is ambulating. Vaginal bleeding is mild. She denies fever or chills. Abdominal pain is mild and controlled with oral medications. She is breastfeeding. She desires circumcision for her male baby: yes.    Objective:     Vital Signs (Most Recent):  Temp: 97.9 °F (36.6 °C) (20 0738)  Pulse: 84 (20 0738)  Resp: 18 (20 0738)  BP: 115/73 (20 0738)  SpO2: 99 % (20 0830) Vital Signs (24h Range):  Temp:  [97.7 °F (36.5 °C)-97.9 °F (36.6 °C)] 97.9 °F (36.6 °C)  Pulse:  [73-90] 84  Resp:  [18] 18  BP: (106-141)/(67-88) 115/73     Weight: 68.5 kg (151 lb)  Body mass index is 28.53 kg/m².      Intake/Output Summary (Last 24 hours) at 2020 0850  Last data filed at 2020 1302  Gross per 24 hour   Intake --   Output 1500 ml   Net -1500 ml       Significant Labs:  Lab Results   Component Value Date    GROUPROBE KIRAN POS 2020    " HEPBSAG Negative 10/01/2019    STREPBCULT No Group B Streptococcus isolated 2020     Recent Labs   Lab 20  1250   HGB 11.6*   HCT 36.3*       I have personallly reviewed all pertinent lab results from the last 24 hours.    Physical Exam:   Constitutional: She is oriented to person, place, and time. She appears well-developed and well-nourished.    HENT:   Head: Normocephalic.    Eyes: Conjunctivae are normal.    Neck: Normal range of motion.     Pulmonary/Chest: Effort normal.        Abdominal: Soft. Bowel sounds are normal.     Genitourinary: Vagina normal and uterus normal.           Musculoskeletal: Normal range of motion.       Neurological: She is alert and oriented to person, place, and time.    Skin: Skin is warm and dry.    Psychiatric: She has a normal mood and affect. Her behavior is normal. Judgment and thought content normal.       Assessment/Plan:     34 y.o. female  for:    *  (normal spontaneous vaginal delivery)  Routine postpartum orders    Single live   Male infant in the care of peds  Breastfeeding well     History of postpartum hemorrhage  Pitocin after delivery of baby        Disposition: As patient meets milestones, will plan to discharge this evening.    Theodora Mcguire CNM  Obstetrics  [facility] [place]

## 2020-04-30 NOTE — PLAN OF CARE
on  at 0732, VSS, afebrile. Pt. Doing well. Pain controlled with ibuprofen and norco. Breastfeeding and bonding with baby. Will continue to monitor.

## 2020-04-30 NOTE — DISCHARGE INSTRUCTIONS
"Mother Self Care:    Activity: Avoid strenuous exercise and get adequate rest.  No driving until the physician consent given.  Emotional Changes: Most women find birth to be a time of great emotional upheaval.  Sense of loss, mood swings, fatigue, anxiety, and feeling "let down" are common.  If feelings worsen or last more than a week, call your physician.  Breast Care/Breastfeeding: Wear a bra for comfort.  Keep nipples dry and apply your own breast milk or lanolin cream as needed for soreness.  Engorgement can be relieved with warm, moist heat before feedings.  You may also take Ibuprofen.  Edgar-Care/Vaginal Bleeding: Remember to use your edgar-bottle after urinating.  Your flow will change from red, to pink, to yellow/white color over a period of 2 weeks.  Menstruation will return in 3-8 weeks, or longer if breastfeeding.  Episiotomy Vaginal Delivery: Stitches will dissolve within 10 days to 3 weeks.  Warm baths, tucks, and dermoplast will promote healing.  Avoid bubble baths or strong soaps.  Sexual Activity/Pelvic Rest: No sexual activity, tampons, or douching until your physician gives you consent.  Diet: Continue to eat from the five basic food groups, including plenty of protein, fruits, vegetables, and whole grains.  Limit empty calories and high fat foods.  Drink enough fluids to satisfy thirst and add an extra 500 calories for breastfeeding.  Constipation/Hemorrhoids: Drink plenty of water.  You may take a stool softener or natural laxative (Metamucil). You may use tucks or hemorrhoid ointment and soak in a warm tub.    CALL YOUR OB DOCTOR IF ANY OF THE FOLLOWING OCCURS:  *Heavy bleeding - saturating a pad an hour or passing any large (2-3 inches in size) blood clots.  *Any pain, redness, or tenderness in lower leg.  *You cannot care for yourself or your baby.  *Any signs of infection-      - Temperature greater than 100.5 degrees F      - Foul smelling vaginal discharge and/or incisional drainage      - " Increased episiotomy or incisional pain      - Hot, hard, red or sore area on breast      - Flu-like symptoms      - Any urgency, frequency or burning with urination    Return To the Hospital for further Evaluation:  · Headache not relieved by tylenol or ibuprofen  · Blurry vision, double vision, seeing spots, or flashing lights  · Feeling faint or passing out  · Right epigastric pain  · Difficulty breathing  · Swelling in hands, face, or feet  · Any of these symptoms accompanied by nausea/vomiting  · Gaining more than 5 pounds in one week  · Seizures  These symptoms could be an indication of elevated blood pressure.       If you have any questions that need to be answered immediately please call the Labor & Delivery Unit at [phone] and ask to speak to a nurse.

## 2020-05-02 ENCOUNTER — TELEPHONE (OUTPATIENT)
Dept: LACTATION | Facility: CLINIC | Age: 35
End: 2020-05-02

## 2020-05-02 NOTE — TELEPHONE ENCOUNTER
Routine follow up call:     Baby vigorously crying in background. Patient reports infant opens mouth at breast, aggressively roots, does not close mouth around breast, and when infant does latch that he sucks briefly before unlatching and crying again.     Adequate output for third day of life with 3 wets and 2 stools since midnight. Patient said she has pumped a few mLs and fed via syringe to infant. Reports breasts are not overly full and that she doesn't feel this could be contributing to latch difficulty.     Recommendation to pump 10-15 min after feedings and provide any milk collected to infant. Reviewed breastfeeding guide and for mother to reference regarding adequate intake and output per day of life. If infant is unable to latch, pump and feed expressed milk, if infant continues to show hunger cues, then infant should be supplemented with formula until satiated.     Supplemental pumping via medela PISA recommended over next several days to establish mature milk supply. Closely monitor intake and output, signs of milk transfer, signs of hunger and satiety, and call with any questions or concerns.     Pediatrician appointment in 2 days (Monday).  Recommended to call with update following ped appointment Monday. Verbalized understanding of information provided.

## 2020-05-04 ENCOUNTER — TELEPHONE (OUTPATIENT)
Dept: LACTATION | Facility: CLINIC | Age: 35
End: 2020-05-04

## 2020-05-04 ENCOUNTER — PATIENT MESSAGE (OUTPATIENT)
Dept: OBSTETRICS AND GYNECOLOGY | Facility: CLINIC | Age: 35
End: 2020-05-04

## 2020-05-04 DIAGNOSIS — N30.00 ACUTE CYSTITIS WITHOUT HEMATURIA: ICD-10-CM

## 2020-05-05 ENCOUNTER — PATIENT MESSAGE (OUTPATIENT)
Dept: OBSTETRICS AND GYNECOLOGY | Facility: CLINIC | Age: 35
End: 2020-05-05

## 2020-05-05 PROBLEM — N30.00 ACUTE CYSTITIS WITHOUT HEMATURIA: Status: ACTIVE | Noted: 2020-05-05

## 2020-05-05 RX ORDER — NITROFURANTOIN 25; 75 MG/1; MG/1
100 CAPSULE ORAL 2 TIMES DAILY
Qty: 14 CAPSULE | Refills: 0 | Status: SHIPPED | OUTPATIENT
Start: 2020-05-05 | End: 2020-05-13

## 2020-05-07 ENCOUNTER — HOSPITAL ENCOUNTER (OUTPATIENT)
Facility: HOSPITAL | Age: 35
Discharge: HOME | End: 2020-05-07
Attending: OBSTETRICS & GYNECOLOGY | Admitting: OBSTETRICS & GYNECOLOGY
Payer: COMMERCIAL

## 2020-05-07 ENCOUNTER — TELEPHONE (OUTPATIENT)
Dept: OBSTETRICS AND GYNECOLOGY | Facility: HOSPITAL | Age: 35
End: 2020-05-07

## 2020-05-07 VITALS — DIASTOLIC BLOOD PRESSURE: 70 MMHG | SYSTOLIC BLOOD PRESSURE: 119 MMHG | TEMPERATURE: 98 F | HEART RATE: 63 BPM

## 2020-05-07 DIAGNOSIS — R03.0 ELEVATED BLOOD PRESSURE READING: ICD-10-CM

## 2020-05-07 LAB
ALBUMIN SERPL BCP-MCNC: 2.9 G/DL (ref 3.5–5.2)
ALP SERPL-CCNC: 119 U/L (ref 55–135)
ALT SERPL W/O P-5'-P-CCNC: 21 U/L (ref 10–44)
ANION GAP SERPL CALC-SCNC: 11 MMOL/L (ref 8–16)
AST SERPL-CCNC: 16 U/L (ref 10–40)
BASOPHILS # BLD AUTO: 0.08 K/UL (ref 0–0.2)
BASOPHILS NFR BLD: 0.8 % (ref 0–1.9)
BILIRUB SERPL-MCNC: 0.2 MG/DL (ref 0.1–1)
BUN SERPL-MCNC: 15 MG/DL (ref 6–20)
CALCIUM SERPL-MCNC: 8.8 MG/DL (ref 8.7–10.5)
CHLORIDE SERPL-SCNC: 108 MMOL/L (ref 95–110)
CO2 SERPL-SCNC: 22 MMOL/L (ref 23–29)
CREAT SERPL-MCNC: 0.7 MG/DL (ref 0.5–1.4)
DIFFERENTIAL METHOD: ABNORMAL
EOSINOPHIL # BLD AUTO: 0.2 K/UL (ref 0–0.5)
EOSINOPHIL NFR BLD: 2 % (ref 0–8)
ERYTHROCYTE [DISTWIDTH] IN BLOOD BY AUTOMATED COUNT: 13.3 % (ref 11.5–14.5)
EST. GFR  (AFRICAN AMERICAN): >60 ML/MIN/1.73 M^2
EST. GFR  (NON AFRICAN AMERICAN): >60 ML/MIN/1.73 M^2
GLUCOSE SERPL-MCNC: 96 MG/DL (ref 70–110)
HCT VFR BLD AUTO: 34.4 % (ref 37–48.5)
HGB BLD-MCNC: 10.8 G/DL (ref 12–16)
IMM GRANULOCYTES # BLD AUTO: 0.04 K/UL (ref 0–0.04)
IMM GRANULOCYTES NFR BLD AUTO: 0.4 % (ref 0–0.5)
LYMPHOCYTES # BLD AUTO: 2.5 K/UL (ref 1–4.8)
LYMPHOCYTES NFR BLD: 25.5 % (ref 18–48)
MCH RBC QN AUTO: 28.4 PG (ref 27–31)
MCHC RBC AUTO-ENTMCNC: 31.4 G/DL (ref 32–36)
MCV RBC AUTO: 91 FL (ref 82–98)
MONOCYTES # BLD AUTO: 0.7 K/UL (ref 0.3–1)
MONOCYTES NFR BLD: 7 % (ref 4–15)
NEUTROPHILS # BLD AUTO: 6.2 K/UL (ref 1.8–7.7)
NEUTROPHILS NFR BLD: 64.3 % (ref 38–73)
NRBC BLD-RTO: 0 /100 WBC
PLATELET # BLD AUTO: 362 K/UL (ref 150–350)
PMV BLD AUTO: 10.5 FL (ref 9.2–12.9)
POTASSIUM SERPL-SCNC: 3.8 MMOL/L (ref 3.5–5.1)
PROT SERPL-MCNC: 6.1 G/DL (ref 6–8.4)
RBC # BLD AUTO: 3.8 M/UL (ref 4–5.4)
SODIUM SERPL-SCNC: 141 MMOL/L (ref 136–145)
WBC # BLD AUTO: 9.68 K/UL (ref 3.9–12.7)

## 2020-05-07 PROCEDURE — 99211 OFF/OP EST MAY X REQ PHY/QHP: CPT

## 2020-05-07 PROCEDURE — 85025 COMPLETE CBC W/AUTO DIFF WBC: CPT

## 2020-05-07 PROCEDURE — 25000003 PHARM REV CODE 250: Performed by: MIDWIFE

## 2020-05-07 PROCEDURE — 80053 COMPREHEN METABOLIC PANEL: CPT

## 2020-05-07 RX ORDER — BUTALBITAL, ACETAMINOPHEN AND CAFFEINE 50; 325; 40 MG/1; MG/1; MG/1
1 TABLET ORAL ONCE
Status: COMPLETED | OUTPATIENT
Start: 2020-05-07 | End: 2020-05-07

## 2020-05-07 RX ADMIN — BUTALBITAL, ACETAMINOPHEN AND CAFFEINE 1 TABLET: 50; 325; 40 TABLET ORAL at 01:05

## 2020-05-07 NOTE — TELEPHONE ENCOUNTER
"Spoke with pt. Pt went into L&D last night and has lab work as well as multiple bp reading done which all came back normal. Pt was discharged last night as well. Pt states that she was told  That it could have been a "false reading". Pt states she is fine and does not need visit with PA today. SHANI Rinaldi  "

## 2020-05-07 NOTE — TELEPHONE ENCOUNTER
Okay, I did not realize that when I sent the message.  I see all her bp's from L&D.  I agree that if she feels okay, she does not need to come in.  Thank you.

## 2020-05-07 NOTE — H&P
[facility] -   Obstetrics & Gynecology  History & Physical    Patient Name: Julienne Breen  MRN: [id number]  Admission Date: 2020  Primary Care Provider: MICHELLE Dillard MD    Subjective:     Chief Complaint/Reason for Admission: elevated BP reading at home with headache    History of Present Illness:  34 y.o.  s/p  on 2020 with c/o elevated BP reading at home and headache unrelieved by Tylenol.         OB History    Para Term  AB Living   2 2 2 0 0 2   SAB TAB Ectopic Multiple Live Births   0 0 0 0 2      # Outcome Date GA Lbr Will/2nd Weight Sex Delivery Anes PTL Lv   2 Term 20 38w5d  3.47 kg (7 lb 10.4 oz) M Vag-Spont EPI N PEYTON      Name: SARAH BREEN      Apgar1: 8  Apgar5: 9   1 Term 11/24/15 39w3d / 01:59 3.29 kg (7 lb 4.1 oz) M Vag-Spont EPI N PEYTON      Complications: Shoulder Dystocia      Apgar1: 7  Apgar5: 9     Past Medical History:   Diagnosis Date    Abnormal Pap smear of vagina     x 2-had 2 normal colposcopies//paps have been normal since    Mental disorder     depression and anxiety    Seasonal allergic rhinitis due to pollen 2019     Past Surgical History:   Procedure Laterality Date    MOUTH SURGERY      tonsilectomy         PTA Medications   Medication Sig    HYDROcodone-acetaminophen (NORCO) 5-325 mg per tablet Take 1 tablet by mouth every 4 (four) hours as needed.    ibuprofen (ADVIL,MOTRIN) 600 MG tablet Take 1 tablet (600 mg total) by mouth every 6 (six) hours.    nitrofurantoin, macrocrystal-monohydrate, (MACROBID) 100 MG capsule Take 1 capsule (100 mg total) by mouth 2 (two) times daily. for 7 days    prenatal vits62/FA/om3/dha/epa (PRENATAL GUMMY ORAL)        Review of patient's allergies indicates:   Allergen Reactions    Ceclor [cefaclor] Other (See Comments)     unknown    Augmentin [amoxicillin-pot clavulanate] Hives    Sulfa (sulfonamide antibiotics) Rash        Family History     Problem Relation (Age of  Onset)    No Known Problems Paternal Grandfather, Paternal Grandmother, Maternal Grandmother, Maternal Grandfather, Father, Mother, Brother, Sister        Tobacco Use    Smoking status: Never Smoker    Smokeless tobacco: Never Used   Substance and Sexual Activity    Alcohol use: Not Currently     Frequency: 2-4 times a month     Drinks per session: 1 or 2     Binge frequency: Never     Comment: socially    Drug use: No    Sexual activity: Yes     Partners: Male     Review of Systems   Neurological: Positive for headaches.      Objective:     Vital Signs (Most Recent):    Vital Signs (24h Range):           There is no height or weight on file to calculate BMI.    Patient's last menstrual period was 08/06/2019 (exact date).    Physical Exam:   Constitutional: She is oriented to person, place, and time. She appears well-developed and well-nourished.    HENT:   Head: Normocephalic.    Eyes: Conjunctivae are normal.    Neck: Normal range of motion.     Pulmonary/Chest: Effort normal.        Abdominal: Soft. Bowel sounds are normal.     Genitourinary: Vagina normal and uterus normal.           Musculoskeletal: Normal range of motion.       Neurological: She is alert and oriented to person, place, and time.    Skin: Skin is warm and dry.    Psychiatric: She has a normal mood and affect. Her behavior is normal. Judgment and thought content normal.       Laboratory:  I have personallly reviewed all pertinent lab results from the last 24 hours.        Assessment/Plan:     * Elevated blood pressure reading  Serial Bps   CBC/CMP  Fioricet given for headache        Theodora Mcguire CNM  Obstetrics & Gynecology  [facility] -

## 2020-05-07 NOTE — SUBJECTIVE & OBJECTIVE
OB History    Para Term  AB Living   2 2 2 0 0 2   SAB TAB Ectopic Multiple Live Births   0 0 0 0 2      # Outcome Date GA Lbr Will/2nd Weight Sex Delivery Anes PTL Lv   2 Term 20 38w5d  3.47 kg (7 lb 10.4 oz) M Vag-Spont EPI N PEYTON      Name: SARAH BREEN      Apgar1: 8  Apgar5: 9   1 Term 11/24/15 39w3d / 01:59 3.29 kg (7 lb 4.1 oz) M Vag-Spont EPI N PEYTON      Complications: Shoulder Dystocia      Apgar1: 7  Apgar5: 9     Past Medical History:   Diagnosis Date    Abnormal Pap smear of vagina     x 2-had 2 normal colposcopies//paps have been normal since    Mental disorder     depression and anxiety    Seasonal allergic rhinitis due to pollen 2019     Past Surgical History:   Procedure Laterality Date    MOUTH SURGERY      tonsilectomy         PTA Medications   Medication Sig    HYDROcodone-acetaminophen (NORCO) 5-325 mg per tablet Take 1 tablet by mouth every 4 (four) hours as needed.    ibuprofen (ADVIL,MOTRIN) 600 MG tablet Take 1 tablet (600 mg total) by mouth every 6 (six) hours.    nitrofurantoin, macrocrystal-monohydrate, (MACROBID) 100 MG capsule Take 1 capsule (100 mg total) by mouth 2 (two) times daily. for 7 days    prenatal vits62/FA/om3/dha/epa (PRENATAL GUMMY ORAL)        Review of patient's allergies indicates:   Allergen Reactions    Ceclor [cefaclor] Other (See Comments)     unknown    Augmentin [amoxicillin-pot clavulanate] Hives    Sulfa (sulfonamide antibiotics) Rash        Family History     Problem Relation (Age of Onset)    No Known Problems Paternal Grandfather, Paternal Grandmother, Maternal Grandmother, Maternal Grandfather, Father, Mother, Brother, Sister        Tobacco Use    Smoking status: Never Smoker    Smokeless tobacco: Never Used   Substance and Sexual Activity    Alcohol use: Not Currently     Frequency: 2-4 times a month     Drinks per session: 1 or 2     Binge frequency: Never     Comment: socially    Drug use: No    Sexual  activity: Yes     Partners: Male     Review of Systems   Neurological: Positive for headaches.      Objective:     Vital Signs (Most Recent):    Vital Signs (24h Range):           There is no height or weight on file to calculate BMI.    Patient's last menstrual period was 08/06/2019 (exact date).    Physical Exam:   Constitutional: She is oriented to person, place, and time. She appears well-developed and well-nourished.    HENT:   Head: Normocephalic.    Eyes: Conjunctivae are normal.    Neck: Normal range of motion.     Pulmonary/Chest: Effort normal.        Abdominal: Soft. Bowel sounds are normal.     Genitourinary: Vagina normal and uterus normal.           Musculoskeletal: Normal range of motion.       Neurological: She is alert and oriented to person, place, and time.    Skin: Skin is warm and dry.    Psychiatric: She has a normal mood and affect. Her behavior is normal. Judgment and thought content normal.       Laboratory:  I have personallly reviewed all pertinent lab results from the last 24 hours.

## 2020-05-07 NOTE — DISCHARGE SUMMARY
[facility] -   Obstetrics & Gynecology  Discharge Summary    Patient Name: Julienne Rosa  MRN: [id number]  Admission Date: 2020  Hospital Length of Stay: 0 days  Discharge Date and Time:  2020 1:43 AM  Attending Physician: Piyush Novoa MD   Discharging Provider: Theodora Mcguire CNM  Primary Care Provider: MICHELLE Dillard MD    HPI:  34 y.o.  s/p  on 2020 with c/o elevated BP reading at home and headache unrelieved by Tylenol.     Hospital Course:  Serial Bps all wnl  CBC/CMP wnl   Fioricet given for headache    * No surgery found *         Significant Diagnostic Studies: Labs: All labs within the past 24 hours have been reviewed    Pending Diagnostic Studies:     None        Final Active Diagnoses:    Diagnosis Date Noted POA    PRINCIPAL PROBLEM:  Elevated blood pressure reading [R03.0] 2020 Yes      Problems Resolved During this Admission:        Discharged Condition: good    Disposition: Home or Self Care    Follow Up:  Follow-up Information     Margot Bond MD.    Specialties:  Obstetrics and Gynecology, Obstetrics  Contact information:  [address] [place] [address] [address] Whit [place] [address] [phone]                 Patient Instructions:      Diet Adult Regular     Pelvic Rest     Notify your health care provider if you experience any of the following:  temperature >100.4     Notify your health care provider if you experience any of the following:  persistent nausea and vomiting or diarrhea     Notify your health care provider if you experience any of the following:  severe uncontrolled pain     Notify your health care provider if you experience any of the following:  difficulty breathing or increased cough     Notify your health care provider if you experience any of the following:  severe persistent headache     Notify your health care provider if you experience any of the following:  persistent dizziness, light-headedness, or visual disturbances      Notify your health care provider if you experience any of the following:  increased confusion or weakness     Notify your health care provider if you experience any of the following:   Order Comments: Increased vaginal bleeding, dizziness, headache, blurred vision, and/or signs of postpartum depression     Medications:  Reconciled Home Medications:      Medication List      CONTINUE taking these medications    HYDROcodone-acetaminophen 5-325 mg per tablet  Commonly known as:  NORCO  Take 1 tablet by mouth every 4 (four) hours as needed.     ibuprofen 600 MG tablet  Commonly known as:  ADVIL,MOTRIN  Take 1 tablet (600 mg total) by mouth every 6 (six) hours.     nitrofurantoin (macrocrystal-monohydrate) 100 MG capsule  Commonly known as:  MACROBID  Take 1 capsule (100 mg total) by mouth 2 (two) times daily. for 7 days     PRENATAL GUMMY ORAL            Theodora Mcguire CNM  Obstetrics & Gynecology  [facility] -

## 2020-05-07 NOTE — NURSING
Pt educated about pre-e symptoms, what to look for and when to come to the hospital. Instructed to contact MD or CNM if she keeps having headaches not relieved by over the counter medications. Pt verbalized understanding and given discharge paperwork.

## 2020-05-12 ENCOUNTER — PATIENT MESSAGE (OUTPATIENT)
Dept: OBSTETRICS AND GYNECOLOGY | Facility: CLINIC | Age: 35
End: 2020-05-12

## 2020-05-13 ENCOUNTER — PATIENT MESSAGE (OUTPATIENT)
Dept: OBSTETRICS AND GYNECOLOGY | Facility: CLINIC | Age: 35
End: 2020-05-13

## 2020-05-13 ENCOUNTER — OFFICE VISIT (OUTPATIENT)
Dept: OBSTETRICS AND GYNECOLOGY | Facility: CLINIC | Age: 35
End: 2020-05-13
Payer: COMMERCIAL

## 2020-05-13 VITALS
WEIGHT: 132.25 LBS | HEIGHT: 61 IN | BODY MASS INDEX: 24.97 KG/M2 | SYSTOLIC BLOOD PRESSURE: 124 MMHG | DIASTOLIC BLOOD PRESSURE: 80 MMHG

## 2020-05-13 DIAGNOSIS — R30.0 DYSURIA: ICD-10-CM

## 2020-05-13 LAB
BACTERIA #/AREA URNS AUTO: ABNORMAL /HPF
BILIRUB UR QL STRIP: NEGATIVE
CLARITY UR REFRACT.AUTO: CLEAR
COLOR UR AUTO: YELLOW
GLUCOSE UR QL STRIP: NEGATIVE
HGB UR QL STRIP: ABNORMAL
KETONES UR QL STRIP: NEGATIVE
LEUKOCYTE ESTERASE UR QL STRIP: ABNORMAL
MICROSCOPIC COMMENT: ABNORMAL
NITRITE UR QL STRIP: NEGATIVE
NON-SQ EPI CELLS #/AREA URNS AUTO: 1 /HPF
PH UR STRIP: 6 [PH] (ref 5–8)
PROT UR QL STRIP: NEGATIVE
RBC #/AREA URNS AUTO: 17 /HPF (ref 0–4)
SP GR UR STRIP: 1.01 (ref 1–1.03)
SQUAMOUS #/AREA URNS AUTO: 1 /HPF
URN SPEC COLLECT METH UR: ABNORMAL
WBC #/AREA URNS AUTO: 36 /HPF (ref 0–5)

## 2020-05-13 PROCEDURE — 99999 PR PBB SHADOW E&M-EST. PATIENT-LVL III: CPT | Mod: PBBFAC,,, | Performed by: OBSTETRICS & GYNECOLOGY

## 2020-05-13 PROCEDURE — 87086 URINE CULTURE/COLONY COUNT: CPT

## 2020-05-13 PROCEDURE — 0503F POSTPARTUM CARE VISIT: CPT | Mod: S$GLB,,, | Performed by: OBSTETRICS & GYNECOLOGY

## 2020-05-13 PROCEDURE — 0503F PR POSTPARTUM CARE VISIT: ICD-10-PCS | Mod: S$GLB,,, | Performed by: OBSTETRICS & GYNECOLOGY

## 2020-05-13 PROCEDURE — 81001 URINALYSIS AUTO W/SCOPE: CPT

## 2020-05-13 PROCEDURE — 99999 PR PBB SHADOW E&M-EST. PATIENT-LVL III: ICD-10-PCS | Mod: PBBFAC,,, | Performed by: OBSTETRICS & GYNECOLOGY

## 2020-05-13 RX ORDER — LIDOCAINE HYDROCHLORIDE 20 MG/ML
JELLY TOPICAL
Qty: 30 ML | Refills: 1 | Status: SHIPPED | OUTPATIENT
Start: 2020-05-13 | End: 2020-12-16

## 2020-05-13 NOTE — PROGRESS NOTES
Subjective:       Patient ID: Julienne Rosa is a 34 y.o. female.    Chief Complaint:  Vaginal Pain      History of Present Illness  HPI  Pt presents with burning with urination and pelvic pressure when trying to void.  She is s/p  on 20 of a [id number] baby.  Periurethral lac was noted and repaired with 3-0 chromic.  Did well until 4-5 days post-partum when her symptoms started.  She notified us, and I prescribed macrobid for possible UTI.  States the burning somewhat improved, but is still present and she just wants to make sure everything is okay.      GYN & OB History  Patient's last menstrual period was 2019 (exact date).   Date of Last Pap: 2018    OB History    Para Term  AB Living   2 2 2     2   SAB TAB Ectopic Multiple Live Births         0 2      # Outcome Date GA Lbr Will/2nd Weight Sex Delivery Anes PTL Lv   2 Term 20 38w5d  3.47 kg (7 lb 10.4 oz) M Vag-Spont EPI N PEYTON   1 Term 11/24/15 39w3d / 01:59 3.29 kg (7 lb 4.1 oz) M Vag-Spont EPI N PEYTON      Complications: Shoulder Dystocia       Review of Systems  Review of Systems   Constitutional: Negative for fatigue, fever and unexpected weight change.   Gastrointestinal: Negative for abdominal pain, constipation, diarrhea, nausea and vomiting.   Genitourinary: Positive for dysuria, pelvic pain (pelvic pressure when trying to void) and vaginal bleeding (normal postpartum bleeding). Negative for frequency, urgency, vaginal discharge, vaginal pain, postcoital bleeding and vaginal odor.           Objective:    Physical Exam:   Constitutional: She is oriented to person, place, and time. She appears well-developed and well-nourished. No distress.             Abdominal: Soft. She exhibits no distension and no mass. There is no tenderness. There is no rebound and no guarding. Hernia confirmed negative in the right inguinal area and confirmed negative in the left inguinal area.     Genitourinary:       Pelvic exam was  performed with patient supine. There is no rash, tenderness, lesion or injury on the right labia. There is no rash, tenderness, lesion or injury on the left labia. Uterus is experiencing uterine prolapse (some descent of the cervix to 2cm proximal to the hymenal ring). Uterus is not deviated, not enlarged, not fixed and not tender. Right adnexum displays no mass, no tenderness and no fullness. Left adnexum displays no mass, no tenderness and no fullness. There is bleeding (normal lochia) in the vagina. No erythema, tenderness, rectocele, cystocele or unspecified prolapse of vaginal walls in the vagina. No foreign body in the vagina. No signs of injury around the vagina. No vaginal discharge found. Cervix exhibits no motion tenderness, no discharge and no friability.        Uterus Size: 6 cm       Neurological: She is alert and oriented to person, place, and time.     Psychiatric: She has a normal mood and affect.          Assessment:        1. Laceration of periurethral tissue with delivery    2. Dysuria                Plan:      Julienne was seen today for vaginal pain.    Diagnoses and all orders for this visit:    Laceration of periurethral tissue with delivery  -     lidocaine HCL 2% (XYLOCAINE) 2 % jelly; Apply to affected area daily prn    Dysuria  -     Urinalysis  -     Urine culture  -     lidocaine HCL 2% (XYLOCAINE) 2 % jelly; Apply to affected area daily prn    Advised that pelvic pressure can be normal following a vaginal delivery.  Dysuria is likely due to small laceration.  Spray warm water with edgar bottle to the urethra while voiding.  Do sits baths twice daily.  Lidocaine jelly prior to voiding.  Will notify of urine culture.

## 2020-05-13 NOTE — TELEPHONE ENCOUNTER
I do not see an available appointment with you until her June appointment. Would you like to fit her in somewhere or me to schedule her with another provider. Please advise. Thank you!

## 2020-05-14 LAB — BACTERIA UR CULT: NO GROWTH

## 2020-06-02 ENCOUNTER — POSTPARTUM VISIT (OUTPATIENT)
Dept: OBSTETRICS AND GYNECOLOGY | Facility: CLINIC | Age: 35
End: 2020-06-02
Payer: COMMERCIAL

## 2020-06-02 VITALS
SYSTOLIC BLOOD PRESSURE: 120 MMHG | BODY MASS INDEX: 25.58 KG/M2 | WEIGHT: 135.38 LBS | DIASTOLIC BLOOD PRESSURE: 74 MMHG

## 2020-06-02 DIAGNOSIS — Z30.011 ENCOUNTER FOR INITIAL PRESCRIPTION OF CONTRACEPTIVE PILLS: ICD-10-CM

## 2020-06-02 PROBLEM — R03.0 ELEVATED BLOOD PRESSURE READING: Status: RESOLVED | Noted: 2020-05-07 | Resolved: 2020-06-02

## 2020-06-02 PROBLEM — N30.00 ACUTE CYSTITIS WITHOUT HEMATURIA: Status: RESOLVED | Noted: 2020-05-05 | Resolved: 2020-06-02

## 2020-06-02 PROCEDURE — 99999 PR PBB SHADOW E&M-EST. PATIENT-LVL II: CPT | Mod: PBBFAC,,, | Performed by: OBSTETRICS & GYNECOLOGY

## 2020-06-02 PROCEDURE — 0503F POSTPARTUM CARE VISIT: CPT | Mod: S$GLB,,, | Performed by: OBSTETRICS & GYNECOLOGY

## 2020-06-02 PROCEDURE — 0503F PR POSTPARTUM CARE VISIT: ICD-10-PCS | Mod: S$GLB,,, | Performed by: OBSTETRICS & GYNECOLOGY

## 2020-06-02 PROCEDURE — 99999 PR PBB SHADOW E&M-EST. PATIENT-LVL II: ICD-10-PCS | Mod: PBBFAC,,, | Performed by: OBSTETRICS & GYNECOLOGY

## 2020-06-02 RX ORDER — ACETAMINOPHEN AND CODEINE PHOSPHATE 120; 12 MG/5ML; MG/5ML
1 SOLUTION ORAL DAILY
Qty: 30 TABLET | Refills: 11 | Status: SHIPPED | OUTPATIENT
Start: 2020-06-02 | End: 2020-12-16

## 2020-06-02 NOTE — PROGRESS NOTES
"CC: Post-partum follow-up    Julienne Rosa is a 35 y.o. female  who presents for post-partum visit.  She is S/P a .  She and the baby are doing well.  Pelvic pressure has improved.  Still with slight burning at the urethra with urination, but this is also getting better.  No fever.   No bowel / bladder complaints.    Delivery Date: 2020  Delivery MD: Dr. Margot Bond  Gender: male "Chet"  Birth Weight: 7 pounds 10 ounces  Breast Feeding: YES  Depression: NO  Contraception: oral progesterone-only contraceptive    Pregnancy was complicated by:  Hx of postpartum hemorrhage  Hx of shoulder dystocia    /74   Wt 61.4 kg (135 lb 5.8 oz)   LMP 2019 (Exact Date)   BMI 25.58 kg/m²     ROS:  GENERAL: No fever, chills, fatigability.  VULVAR: No pain, no lesions and no itching.  VAGINAL: No relaxation, no itching, no discharge, no abnormal bleeding and no lesions.  ABDOMEN: No abdominal pain. Denies nausea. Denies vomiting. No diarrhea. No constipation  BREAST: Denies pain. No lumps. No discharge.  URINARY: No incontinence, no nocturia, no frequency and no dysuria.  CARDIOVASCULAR: No chest pain. No shortness of breath. No leg cramps.  NEUROLOGICAL: No headaches. No vision changes.    PHYSICAL EXAM:  ABDOMEN:  Soft, non-tender, non-distended  VULVA:  Normal, no lesions; periurethral laceration site is healing well  CERVIX:  Without lesions, polyps or tenderness.  UTERUS:  Normal size, shape, consistency, no mass or tenderness.  ADNEXA:  Normal in size without mass or tenderness    Julienne was seen today for postpartum care.    Diagnoses and all orders for this visit:     (normal spontaneous vaginal delivery)    Encounter for initial prescription of contraceptive pills  -     norethindrone (MICRONOR) 0.35 mg tablet; Take 1 tablet (0.35 mg total) by mouth once daily.    Doing well post-partum.  May resume regular activities.  Pt may consider pelvic floor PT if pelvic pressure does " not resolve after another few weeks, but since it is getting better, she will wait for now.  RTC 1 year.    No follow-ups on file.

## 2020-10-06 ENCOUNTER — PATIENT MESSAGE (OUTPATIENT)
Dept: ADMINISTRATIVE | Facility: HOSPITAL | Age: 35
End: 2020-10-06

## 2020-11-12 ENCOUNTER — PATIENT MESSAGE (OUTPATIENT)
Dept: OBSTETRICS AND GYNECOLOGY | Facility: CLINIC | Age: 35
End: 2020-11-12

## 2020-11-12 NOTE — TELEPHONE ENCOUNTER
Please see her "[employer]" message.  Please generate a letter for her insurance company stating that the ultrasound done on January 20th was medically necessary to complete views of fetal intracranial anatomy not able to be visualized on the prior ultrasound.

## 2020-12-16 ENCOUNTER — LAB VISIT (OUTPATIENT)
Dept: LAB | Facility: HOSPITAL | Age: 35
End: 2020-12-16
Attending: OBSTETRICS & GYNECOLOGY
Payer: COMMERCIAL

## 2020-12-16 ENCOUNTER — OFFICE VISIT (OUTPATIENT)
Dept: OBSTETRICS AND GYNECOLOGY | Facility: CLINIC | Age: 35
End: 2020-12-16
Payer: COMMERCIAL

## 2020-12-16 VITALS
DIASTOLIC BLOOD PRESSURE: 68 MMHG | SYSTOLIC BLOOD PRESSURE: 104 MMHG | BODY MASS INDEX: 25.51 KG/M2 | WEIGHT: 135.13 LBS | HEIGHT: 61 IN

## 2020-12-16 DIAGNOSIS — Z32.01 PREGNANCY EXAMINATION OR TEST, POSITIVE RESULT: ICD-10-CM

## 2020-12-16 DIAGNOSIS — Z87.59 HISTORY OF SHOULDER DYSTOCIA IN PRIOR PREGNANCY: ICD-10-CM

## 2020-12-16 DIAGNOSIS — Z32.01 PREGNANCY EXAMINATION OR TEST, POSITIVE RESULT: Primary | ICD-10-CM

## 2020-12-16 DIAGNOSIS — Z87.59 HISTORY OF POSTPARTUM HEMORRHAGE: ICD-10-CM

## 2020-12-16 LAB
ABO + RH BLD: NORMAL
BASOPHILS # BLD AUTO: 0.04 K/UL (ref 0–0.2)
BASOPHILS NFR BLD: 0.7 % (ref 0–1.9)
BLD GP AB SCN CELLS X3 SERPL QL: NORMAL
DIFFERENTIAL METHOD: ABNORMAL
EOSINOPHIL # BLD AUTO: 0.1 K/UL (ref 0–0.5)
EOSINOPHIL NFR BLD: 1 % (ref 0–8)
ERYTHROCYTE [DISTWIDTH] IN BLOOD BY AUTOMATED COUNT: 13.2 % (ref 11.5–14.5)
HCT VFR BLD AUTO: 42.4 % (ref 37–48.5)
HGB BLD-MCNC: 13.3 G/DL (ref 12–16)
IMM GRANULOCYTES # BLD AUTO: 0.02 K/UL (ref 0–0.04)
IMM GRANULOCYTES NFR BLD AUTO: 0.3 % (ref 0–0.5)
LYMPHOCYTES # BLD AUTO: 1.4 K/UL (ref 1–4.8)
LYMPHOCYTES NFR BLD: 22.2 % (ref 18–48)
MCH RBC QN AUTO: 28.8 PG (ref 27–31)
MCHC RBC AUTO-ENTMCNC: 31.4 G/DL (ref 32–36)
MCV RBC AUTO: 92 FL (ref 82–98)
MONOCYTES # BLD AUTO: 0.5 K/UL (ref 0.3–1)
MONOCYTES NFR BLD: 7.7 % (ref 4–15)
NEUTROPHILS # BLD AUTO: 4.2 K/UL (ref 1.8–7.7)
NEUTROPHILS NFR BLD: 68.1 % (ref 38–73)
NRBC BLD-RTO: 0 /100 WBC
PLATELET # BLD AUTO: 268 K/UL (ref 150–350)
PMV BLD AUTO: 12.2 FL (ref 9.2–12.9)
RBC # BLD AUTO: 4.62 M/UL (ref 4–5.4)
WBC # BLD AUTO: 6.12 K/UL (ref 3.9–12.7)

## 2020-12-16 PROCEDURE — 86762 RUBELLA ANTIBODY: CPT

## 2020-12-16 PROCEDURE — 86592 SYPHILIS TEST NON-TREP QUAL: CPT

## 2020-12-16 PROCEDURE — 85025 COMPLETE CBC W/AUTO DIFF WBC: CPT

## 2020-12-16 PROCEDURE — 99999 PR PBB SHADOW E&M-EST. PATIENT-LVL III: ICD-10-PCS | Mod: PBBFAC,,, | Performed by: OBSTETRICS & GYNECOLOGY

## 2020-12-16 PROCEDURE — 36415 COLL VENOUS BLD VENIPUNCTURE: CPT

## 2020-12-16 PROCEDURE — 99999 PR PBB SHADOW E&M-EST. PATIENT-LVL III: CPT | Mod: PBBFAC,,, | Performed by: OBSTETRICS & GYNECOLOGY

## 2020-12-16 PROCEDURE — 86703 HIV-1/HIV-2 1 RESULT ANTBDY: CPT

## 2020-12-16 PROCEDURE — 3008F BODY MASS INDEX DOCD: CPT | Mod: CPTII,S$GLB,, | Performed by: OBSTETRICS & GYNECOLOGY

## 2020-12-16 PROCEDURE — 86850 RBC ANTIBODY SCREEN: CPT

## 2020-12-16 PROCEDURE — 3008F PR BODY MASS INDEX (BMI) DOCUMENTED: ICD-10-PCS | Mod: CPTII,S$GLB,, | Performed by: OBSTETRICS & GYNECOLOGY

## 2020-12-16 PROCEDURE — 87340 HEPATITIS B SURFACE AG IA: CPT

## 2020-12-16 PROCEDURE — 99213 OFFICE O/P EST LOW 20 MIN: CPT | Mod: S$GLB,,, | Performed by: OBSTETRICS & GYNECOLOGY

## 2020-12-16 PROCEDURE — 99213 PR OFFICE/OUTPT VISIT, EST, LEVL III, 20-29 MIN: ICD-10-PCS | Mod: S$GLB,,, | Performed by: OBSTETRICS & GYNECOLOGY

## 2020-12-16 NOTE — PROGRESS NOTES
"  Subjective:       Patient ID: Julienne Rosa is a 35 y.o. female.    Chief Complaint:  Possible Pregnancy      History of Present Illness  HPI  Presents for pregnancy confirmation.  This is an unplanned pregnancy.  Pt has a 7 month old, and is breastfeeding.  Had one period, and has not been on birth control.  Thinks she may have conceived on her 's birthday on [birth year].  No current pain or bleeding.  Has slight nausea.  This is her third pregnancy.   G1: term  11/24/15 complicated by 2 maneuver shoulder dystocia and PP hemorrhage due to atony ("Kirill")  G2: term  20, uncomplicated ("Chet")    Last pap 2018: normal    GYN & OB History  No LMP recorded. Patient is pregnant.   Date of Last Pap: No result found    OB History    Para Term  AB Living   3 2 2     2   SAB TAB Ectopic Multiple Live Births         0 2      # Outcome Date GA Lbr Will/2nd Weight Sex Delivery Anes PTL Lv   3 Current            2 Term 20 38w5d  3.47 kg (7 lb 10.4 oz) M Vag-Spont EPI N PEYTON   1 Term 11/24/15 39w3d / 01:59 3.29 kg (7 lb 4.1 oz) M Vag-Spont EPI N PEYTON      Complications: Shoulder Dystocia       Review of Systems  Review of Systems   Constitutional: Negative for activity change, fatigue, fever and unexpected weight change.   Gastrointestinal: Positive for nausea. Negative for abdominal pain, bloating, constipation, diarrhea and vomiting.   Endocrine: Negative for hair loss and hot flashes.   Genitourinary: Positive for menstrual problem (amenorrhea). Negative for dysmenorrhea, dyspareunia, dysuria, frequency, genital sores, hematuria, menorrhagia, pelvic pain, urgency, vaginal bleeding, vaginal discharge, vaginal pain, postcoital bleeding and vaginal odor.   Integumentary:  Negative for rash, hair changes, breast mass, nipple discharge and breast skin changes.   Hematological: Negative for adenopathy.   Breast: Negative for mass, mastodynia, nipple discharge and skin changes        "   Objective:    Physical Exam:   Constitutional: She is oriented to person, place, and time. She appears well-developed and well-nourished. No distress.                           Neurological: She is alert and oriented to person, place, and time.    Skin: No rash noted. No erythema. No pallor.    Psychiatric: She has a normal mood and affect. Her behavior is normal. Judgment and thought content normal.        UPT: positive  Assessment:        1. Pregnancy examination or test, positive result    2. History of shoulder dystocia in prior pregnancy    3. History of postpartum hemorrhage                Plan:      Julienne was seen today for possible pregnancy.    Diagnoses and all orders for this visit:    Pregnancy examination or test, positive result  -     CBC Auto Differential; Future  -     Hepatitis B Surface Antigen; Future  -     HIV 1/2 Ag/Ab (4th Gen); Future  -     RPR; Future  -     Rubella Antibody, IgG; Future  -     Type & Screen - Ob Profile; Future  -     Urinalysis  -     Urine culture  -     US OB/GYN Procedure ([facility]); Future    History of shoulder dystocia in prior pregnancy    History of postpartum hemorrhage    The patient was counseled on common complaints of pregnancy.  She was given bleeding and pain precautions.  I oriented the patient to the collaborative CNM/physician practice, and all questions were answered.    RTC 2-3 weeks for initial OB visit, labs, and ultrasound.   Reassured that it's okay for her to continue breastfeeding.

## 2020-12-16 NOTE — PATIENT INSTRUCTIONS
Pregnancy    Your exam today shows that you are pregnant.  Pregnancy symptoms  During pregnancy your bodys hormones change. This causes physical and emotional changes. This is normal. Knowing what to expect is important for your piece of mind and so you know when to seek help for a problem. Here are some of the most common symptoms:  · Morning sickness or nausea. This can happen any time of the day or night.  · Tender, swollen breasts  · Need to urinate frequently  · Tiredness or fatigue  · Dizziness  · Indigestion or heartburn  · Food cravings or turn-offs  · Constipation  · Emotional changes. This can range from anxiety to excitement to depression.  General care for a healthy pregnancy  Here are things you can do to help make sure your baby is born healthy:  · Rest when you feel tired. This is especially true in the later months of pregnancy.  · Drink more fluids. Your body needs more fluids than you may be used to. Drink 8 to10 glasses of juice, milk, or water every day.  · Eat well-balanced meals. Eat at regular times to give your body enough protein. You can expect to gain about 30 pounds during the pregnancy. Dont try to diet or lose weight while you are pregnant.  · Take a prenatal vitamin every day. This helps you meet the extra nutritional needs of pregnancy.  · Dont take any other medicine during your pregnancy unless your healthcare provider tells you to. This includes prescription medicines and those you buy over the counter. Many medicines can harm the growing baby.  · If you have nausea or vomiting, dont eat greasy or fried foods. Eat several smaller meals throughout the day rather than 3 large meals.  · If you smoke, you must stop. The nicotine you breathe in goes right to the baby.  · Stay away from alcohol, even in moderate amounts. Daily drinking will harm your baby and can cause permanent brain damage.  · Dont use recreational drugs, especially cocaine, crack, and heroin. These will harm  your baby. Also avoid marijuana.  · If you were using recreational drugs or prescribed medicine when you found out that you were pregnant, talk with your healthcare provider about possible effects on your growing baby.  · If you have medical problems that you need to take medicine for, talk with your healthcare provider.  Follow-up care  Call your healthcare provider to arrange for prenatal care. Prenatal care is important. You can see your family provider, a pregnancy specialist (obstetrician), or a primary care clinic.  When to seek medical advice  Call your healthcare provider right away if any of these occur:  · Vaginal bleeding  · Pain in your belly (abdomen) or back that is moderate or severe  · Lots of vomiting, or you cant keep any fluids down for 6 hours  · Burning feeling when you urinate  · Headache, dizziness, or rapid weight gain  · Fever  · Vision changes or blurred vision  Date Last Reviewed: 10/1/2016  © 6941-5630 [employer]. [address] [place] [address], [place] [address]. All rights reserved. This information is not intended as a substitute for professional medical care. Always follow your healthcare professional's instructions.

## 2020-12-17 LAB
HBV SURFACE AG SERPL QL IA: NEGATIVE
HIV 1+2 AB+HIV1 P24 AG SERPL QL IA: NEGATIVE
RPR SER QL: NORMAL
RUBV IGG SER-ACNC: 17.3 IU/ML
RUBV IGG SER-IMP: REACTIVE

## 2020-12-31 ENCOUNTER — INITIAL PRENATAL (OUTPATIENT)
Dept: OBSTETRICS AND GYNECOLOGY | Facility: CLINIC | Age: 35
End: 2020-12-31
Payer: COMMERCIAL

## 2020-12-31 ENCOUNTER — PROCEDURE VISIT (OUTPATIENT)
Dept: OBSTETRICS AND GYNECOLOGY | Facility: CLINIC | Age: 35
End: 2020-12-31
Payer: COMMERCIAL

## 2020-12-31 VITALS
SYSTOLIC BLOOD PRESSURE: 118 MMHG | DIASTOLIC BLOOD PRESSURE: 68 MMHG | BODY MASS INDEX: 25.16 KG/M2 | WEIGHT: 133.19 LBS

## 2020-12-31 DIAGNOSIS — O09.899 SHORT INTERVAL BETWEEN PREGNANCIES AFFECTING PREGNANCY, ANTEPARTUM: Primary | ICD-10-CM

## 2020-12-31 DIAGNOSIS — Z87.59 HISTORY OF POSTPARTUM HEMORRHAGE: ICD-10-CM

## 2020-12-31 DIAGNOSIS — Z87.59 HISTORY OF SHOULDER DYSTOCIA IN PRIOR PREGNANCY: ICD-10-CM

## 2020-12-31 DIAGNOSIS — O09.521 MULTIGRAVIDA OF ADVANCED MATERNAL AGE IN FIRST TRIMESTER: ICD-10-CM

## 2020-12-31 DIAGNOSIS — R11.0 NAUSEA: ICD-10-CM

## 2020-12-31 DIAGNOSIS — Z32.01 PREGNANCY EXAMINATION OR TEST, POSITIVE RESULT: ICD-10-CM

## 2020-12-31 PROBLEM — O09.529 ADVANCED MATERNAL AGE IN MULTIGRAVIDA: Status: ACTIVE | Noted: 2020-12-31

## 2020-12-31 PROCEDURE — 76801 OB US < 14 WKS SINGLE FETUS: CPT | Mod: S$GLB,,, | Performed by: OBSTETRICS & GYNECOLOGY

## 2020-12-31 PROCEDURE — 99999 PR PBB SHADOW E&M-EST. PATIENT-LVL III: ICD-10-PCS | Mod: PBBFAC,,, | Performed by: OBSTETRICS & GYNECOLOGY

## 2020-12-31 PROCEDURE — 0500F INITIAL PRENATAL CARE VISIT: CPT | Mod: S$GLB,,, | Performed by: OBSTETRICS & GYNECOLOGY

## 2020-12-31 PROCEDURE — 76801 PR US, OB <14WKS, TRANSABD, SINGLE GESTATION: ICD-10-PCS | Mod: S$GLB,,, | Performed by: OBSTETRICS & GYNECOLOGY

## 2020-12-31 PROCEDURE — 87491 CHLMYD TRACH DNA AMP PROBE: CPT

## 2020-12-31 PROCEDURE — 0500F PR INITIAL PRENATAL CARE VISIT: ICD-10-PCS | Mod: S$GLB,,, | Performed by: OBSTETRICS & GYNECOLOGY

## 2020-12-31 PROCEDURE — 99999 PR PBB SHADOW E&M-EST. PATIENT-LVL III: CPT | Mod: PBBFAC,,, | Performed by: OBSTETRICS & GYNECOLOGY

## 2020-12-31 RX ORDER — ONDANSETRON 4 MG/1
4 TABLET, ORALLY DISINTEGRATING ORAL EVERY 8 HOURS PRN
Qty: 30 TABLET | Refills: 1 | Status: SHIPPED | OUTPATIENT
Start: 2020-12-31 | End: 2021-04-28

## 2020-12-31 NOTE — PROGRESS NOTES
"Here for routine OB visit.  Pregnancy is unplanned, but she and her  are excited.  Had a baby 8 months ago, and still breastfeeding.  Plans to continue at this time.  Denies any VB or pain.  Has nausea and fatigue throughout the day.  No vomiting, but requests Rx for antiemetic in case she needs it.  Rx for zofran sent.  Sono today: viable IUP at 7w6d,  bpm, JORGE 21  OB hx:  G1: term  11/24/15 complicated by 2 maneuver shoulder dystocia and PP hemorrhage due to atony ("Kirill")  G2: term  20, uncomplicated ("Chet")    Reviewed genetic testing options, but she declines  Signed pt up for [facility]  Start aspirin 81mg daily at 11 wga  The patient was counseled on common complaints of pregnancy.  She was given bleeding and pain precautions.  I oriented the patient to the collaborative CNM/physician practice, and all questions were answered.    RTC 4 weeks for routine OB visit  "

## 2020-12-31 NOTE — PATIENT INSTRUCTIONS
Healthy Eating Habits During Pregnancy    Its important to develop healthy eating habits while you are pregnant, for you as well as for your baby. Here are some ways to stay healthy.  Aim for a healthy weight  A slow, steady rate of weight gain is often best. After the first trimester, you may gain about a pound a week. If you were overweight before pregnancy, you need to gain fewer pounds. Your healthcare provider can give you a healthy weight goal for your pregnancy.  Dont diet  Now is not the time to diet. You may not get enough of the nutrients you and your baby need. Instead, learn how to be a healthy eater. Start by doing it for your baby. Soon, you may do it for yourself.  Vitamins and supplements  Talk with your healthcare provider about taking these and other prenatal vitamins and supplements.  · Iron makes the extra blood you need now.  · Calcium and vitamin D help build and keep strong bones.  · Folic acid helps prevent certain birth defects.  · Some vitamins may not be safe to take. Your healthcare provider will tell you which ones to avoid.  Fluids  Drink at least 8 to 10 cups of fluid daily. Your baby needs fluids. Fluids also decrease constipation, flush out toxins and waste, limit swelling, and help prevent bladder infections. Water is best. Other good choices are:  · Water or seltzer water with a slice of lemon or lime. (These can also help ease an upset stomach.)  · Clear soups that are low in salt  · Low-fat or fat-free milk; soy or rice milk with calcium added  · 100% fruit juices mixed with water  · Popsicles or gelatin  Things to avoid  Some things might harm your growing baby. Dont eat or drink:  · Alcohol  · Unpasteurized dairy foods and juices  · Raw or undercooked meat, poultry, fish, or eggs  · Prepared meats, like deli meats or hot dogs, unless heated until steaming hot  · Fish that are high in mercury, like shark, swordfish, anila mackerel, tilefish, and albacore tuna   Things to  limit  Ask your healthcare provider whether its safe to eat or drink:  · Caffeine  · Artificial sweeteners  · Organ meats  · Certain types of fish  · Fish and shellfish that contain mercury in lower amounts, like shrimp, canned light tuna, salmon, pollock, and catfish   Date Last Reviewed: 2015  © 9991-9846 [employer]. [address] [place] [address], [place] [address]. All rights reserved. This information is not intended as a substitute for professional medical care. Always follow your healthcare professional's instructions.        Nutrition During Pregnancy    Having a healthy baby depends mostly on you. What you eat matters to your baby and your health. During pregnancy, you will likely need about 300 more calories per day than before you became pregnant. Each day, try to eat the number of servings listed here for each food group. In addition, cut down on salt and caffeine. Limit the amount of sweets and high-fat foods you eat. Dont smoke or drink alcohol.  Important: See your healthcare provider as often as requested. If you have any questions, be sure to ask them.  Fruits  2 cups  Examples of 1-cup servings:  1 medium apple  1 medium orange  1 medium banana  1 cup chopped fruit  1 cup 100% fruit juice (pasteurized)  1/2 cup dried fruit Vegetables  2-1/2 to 3 cups   Examples of 1 servin cups raw, leafy greens  1 cup raw or cooked cut-up vegetables  1 cup 100% vegetable juice (pasteurized) Grains & Cereals*  6 to 8 ounces  Examples of 1-ounce servings:  1 slice bread  1/2 cup cooked rice  1/2 cup cooked cereal  1/2 cup pasta  1 ounce cold cereal Fats & Oils  6 to 8 teaspoons   Dairy**  3 cups  Examples of 1-cup servings:  1 cup milk  1 cup yogurt  1-1/2 ounces natural cheese  2 ounces processed cheese Protein---  5 to 6-1/2 ounces  Examples of 1-ounce servings:  1 egg  1 ounce of lean meat, poultry, or fish  1/4 cup cooked beans  1 tablespoon peanut butter  1/2 ounce nuts Fluids  8 or more  8-ounce glasses  Examples:  Water  Diluted juices: Apple, orange, cranberry  Mineral water  Clear soups, broth     *Note: Choose whole grains whenever possible.  ** Note: Try to choose low-fat options; avoid soft cheeses and unpasteurized milk.  --- Notes: Avoid raw or undercooked meats, eggs, and seafood. fish, and shellfish. Also, some types of fish, like shark, swordfish, and anila mackerel should not be eaten during pregnancy. Avoid hot dogs, luncheon meats, and cold cuts unless heated to steaming just prior to being served. Ask your healthcare provider about safe choices.     Prenatal supplements  A prenatal supplement is a pill that you take daily during pregnancy. It helps make sure youre getting the right amount of certain nutrients that are important to your baby. Ask your healthcare provider to help you choose the best one for you. Important nutrients during pregnancy include:  · Folic acid. It's best to start taking this supplement 1 month before you start trying to get pregnant. Folic acid helps prevent certain problems in your baby. During pregnancy, you need to take 400 micrograms (mcg) of folic acid every day for the first 2 to 3 months after conception, and then 600 mcg is needed for growing fetus and placenta.  · Iron, calcium, and vitamin D. You may also be advised to take these supplements during pregnancy. They help keep you and your baby healthy. Be sure to take them at different times because calcium makes it hard for the body to absorb iron. Taking iron with orange juice helps to increase its absorption.   Date Last Reviewed: 8/9/2015  © 7346-0210 [employer]. [address] [place] [address], [place] [address]. All rights reserved. This information is not intended as a substitute for professional medical care. Always follow your healthcare professional's instructions.

## 2021-01-01 LAB
C TRACH DNA SPEC QL NAA+PROBE: NOT DETECTED
N GONORRHOEA DNA SPEC QL NAA+PROBE: NOT DETECTED

## 2021-01-02 ENCOUNTER — PATIENT MESSAGE (OUTPATIENT)
Dept: ADMINISTRATIVE | Facility: OTHER | Age: 36
End: 2021-01-02

## 2021-01-28 ENCOUNTER — PATIENT MESSAGE (OUTPATIENT)
Dept: OBSTETRICS AND GYNECOLOGY | Facility: CLINIC | Age: 36
End: 2021-01-28

## 2021-01-28 ENCOUNTER — ROUTINE PRENATAL (OUTPATIENT)
Dept: OBSTETRICS AND GYNECOLOGY | Facility: CLINIC | Age: 36
End: 2021-01-28
Payer: COMMERCIAL

## 2021-01-28 VITALS — WEIGHT: 138 LBS | DIASTOLIC BLOOD PRESSURE: 74 MMHG | SYSTOLIC BLOOD PRESSURE: 112 MMHG | BODY MASS INDEX: 26.08 KG/M2

## 2021-01-28 DIAGNOSIS — Z87.59 HISTORY OF POSTPARTUM HEMORRHAGE: ICD-10-CM

## 2021-01-28 DIAGNOSIS — Z87.59 HISTORY OF SHOULDER DYSTOCIA IN PRIOR PREGNANCY: ICD-10-CM

## 2021-01-28 DIAGNOSIS — R11.0 NAUSEA: ICD-10-CM

## 2021-01-28 DIAGNOSIS — O09.899 SHORT INTERVAL BETWEEN PREGNANCIES AFFECTING PREGNANCY, ANTEPARTUM: Primary | ICD-10-CM

## 2021-01-28 DIAGNOSIS — O09.521 MULTIGRAVIDA OF ADVANCED MATERNAL AGE IN FIRST TRIMESTER: ICD-10-CM

## 2021-01-28 PROCEDURE — 0502F PR SUBSEQUENT PRENATAL CARE: ICD-10-PCS | Mod: CPTII,S$GLB,, | Performed by: OBSTETRICS & GYNECOLOGY

## 2021-01-28 PROCEDURE — 99999 PR PBB SHADOW E&M-EST. PATIENT-LVL II: CPT | Mod: PBBFAC,,, | Performed by: OBSTETRICS & GYNECOLOGY

## 2021-01-28 PROCEDURE — 99999 PR PBB SHADOW E&M-EST. PATIENT-LVL II: ICD-10-PCS | Mod: PBBFAC,,, | Performed by: OBSTETRICS & GYNECOLOGY

## 2021-01-28 PROCEDURE — 0502F SUBSEQUENT PRENATAL CARE: CPT | Mod: CPTII,S$GLB,, | Performed by: OBSTETRICS & GYNECOLOGY

## 2021-01-28 RX ORDER — DOXYLAMINE SUCCINATE AND PYRIDOXINE HYDROCHLORIDE, DELAYED RELEASE TABLETS 10 MG/10 MG 10; 10 MG/1; MG/1
1 TABLET, DELAYED RELEASE ORAL NIGHTLY
Qty: 30 TABLET | Refills: 3 | Status: SHIPPED | OUTPATIENT
Start: 2021-01-28 | End: 2021-03-03

## 2021-01-28 RX ORDER — ASPIRIN 81 MG/1
81 TABLET ORAL DAILY
Qty: 150 TABLET | Refills: 2 | Status: ON HOLD
Start: 2021-01-28 | End: 2021-08-01 | Stop reason: HOSPADM

## 2021-01-29 ENCOUNTER — PATIENT MESSAGE (OUTPATIENT)
Dept: ADMINISTRATIVE | Facility: OTHER | Age: 36
End: 2021-01-29

## 2021-02-05 ENCOUNTER — PATIENT MESSAGE (OUTPATIENT)
Dept: ADMINISTRATIVE | Facility: OTHER | Age: 36
End: 2021-02-05

## 2021-02-24 ENCOUNTER — TELEPHONE (OUTPATIENT)
Dept: PHARMACY | Facility: CLINIC | Age: 36
End: 2021-02-24

## 2021-02-25 ENCOUNTER — TELEPHONE (OUTPATIENT)
Dept: OBSTETRICS AND GYNECOLOGY | Facility: CLINIC | Age: 36
End: 2021-02-25

## 2021-02-25 ENCOUNTER — PATIENT MESSAGE (OUTPATIENT)
Dept: OBSTETRICS AND GYNECOLOGY | Facility: CLINIC | Age: 36
End: 2021-02-25

## 2021-02-25 DIAGNOSIS — R11.0 NAUSEA: Primary | ICD-10-CM

## 2021-02-26 ENCOUNTER — ROUTINE PRENATAL (OUTPATIENT)
Dept: OBSTETRICS AND GYNECOLOGY | Facility: CLINIC | Age: 36
End: 2021-02-26
Payer: COMMERCIAL

## 2021-02-26 VITALS — BODY MASS INDEX: 25.53 KG/M2 | SYSTOLIC BLOOD PRESSURE: 98 MMHG | DIASTOLIC BLOOD PRESSURE: 60 MMHG | WEIGHT: 135.13 LBS

## 2021-02-26 DIAGNOSIS — O09.522 MULTIGRAVIDA OF ADVANCED MATERNAL AGE IN SECOND TRIMESTER: Primary | ICD-10-CM

## 2021-02-26 DIAGNOSIS — Z36.89 ENCOUNTER FOR FETAL ANATOMIC SURVEY: ICD-10-CM

## 2021-02-26 DIAGNOSIS — O09.899 SHORT INTERVAL BETWEEN PREGNANCIES AFFECTING PREGNANCY, ANTEPARTUM: ICD-10-CM

## 2021-02-26 DIAGNOSIS — Z87.59 HISTORY OF SHOULDER DYSTOCIA IN PRIOR PREGNANCY: ICD-10-CM

## 2021-02-26 PROCEDURE — 0502F PR SUBSEQUENT PRENATAL CARE: ICD-10-PCS | Mod: CPTII,S$GLB,, | Performed by: ADVANCED PRACTICE MIDWIFE

## 2021-02-26 PROCEDURE — 0502F SUBSEQUENT PRENATAL CARE: CPT | Mod: CPTII,S$GLB,, | Performed by: ADVANCED PRACTICE MIDWIFE

## 2021-02-26 PROCEDURE — 99999 PR PBB SHADOW E&M-EST. PATIENT-LVL III: CPT | Mod: PBBFAC,,, | Performed by: ADVANCED PRACTICE MIDWIFE

## 2021-02-26 PROCEDURE — 99999 PR PBB SHADOW E&M-EST. PATIENT-LVL III: ICD-10-PCS | Mod: PBBFAC,,, | Performed by: ADVANCED PRACTICE MIDWIFE

## 2021-03-25 ENCOUNTER — PATIENT MESSAGE (OUTPATIENT)
Dept: ADMINISTRATIVE | Facility: HOSPITAL | Age: 36
End: 2021-03-25

## 2021-03-25 ENCOUNTER — ROUTINE PRENATAL (OUTPATIENT)
Dept: OBSTETRICS AND GYNECOLOGY | Facility: CLINIC | Age: 36
End: 2021-03-25
Payer: COMMERCIAL

## 2021-03-25 ENCOUNTER — PROCEDURE VISIT (OUTPATIENT)
Dept: OBSTETRICS AND GYNECOLOGY | Facility: CLINIC | Age: 36
End: 2021-03-25
Payer: COMMERCIAL

## 2021-03-25 VITALS
SYSTOLIC BLOOD PRESSURE: 100 MMHG | DIASTOLIC BLOOD PRESSURE: 50 MMHG | WEIGHT: 136.69 LBS | BODY MASS INDEX: 25.83 KG/M2

## 2021-03-25 DIAGNOSIS — Z36.89 ENCOUNTER FOR FETAL ANATOMIC SURVEY: ICD-10-CM

## 2021-03-25 DIAGNOSIS — O09.522 MULTIGRAVIDA OF ADVANCED MATERNAL AGE IN SECOND TRIMESTER: Primary | ICD-10-CM

## 2021-03-25 DIAGNOSIS — Z87.59 HISTORY OF SHOULDER DYSTOCIA IN PRIOR PREGNANCY: ICD-10-CM

## 2021-03-25 DIAGNOSIS — O09.899 SHORT INTERVAL BETWEEN PREGNANCIES AFFECTING PREGNANCY, ANTEPARTUM: ICD-10-CM

## 2021-03-25 DIAGNOSIS — Z87.59 HISTORY OF POSTPARTUM HEMORRHAGE: ICD-10-CM

## 2021-03-25 PROBLEM — R11.0 NAUSEA: Status: RESOLVED | Noted: 2020-12-31 | Resolved: 2021-03-25

## 2021-03-25 PROCEDURE — 76805 PR US, OB 14+WKS, TRANSABD, SINGLE GESTATION: ICD-10-PCS | Mod: S$GLB,,, | Performed by: OBSTETRICS & GYNECOLOGY

## 2021-03-25 PROCEDURE — 99999 PR PBB SHADOW E&M-EST. PATIENT-LVL III: CPT | Mod: PBBFAC,,, | Performed by: OBSTETRICS & GYNECOLOGY

## 2021-03-25 PROCEDURE — 76805 OB US >/= 14 WKS SNGL FETUS: CPT | Mod: S$GLB,,, | Performed by: OBSTETRICS & GYNECOLOGY

## 2021-03-25 PROCEDURE — 0502F PR SUBSEQUENT PRENATAL CARE: ICD-10-PCS | Mod: CPTII,S$GLB,, | Performed by: OBSTETRICS & GYNECOLOGY

## 2021-03-25 PROCEDURE — 99999 PR PBB SHADOW E&M-EST. PATIENT-LVL III: ICD-10-PCS | Mod: PBBFAC,,, | Performed by: OBSTETRICS & GYNECOLOGY

## 2021-03-25 PROCEDURE — 0502F SUBSEQUENT PRENATAL CARE: CPT | Mod: CPTII,S$GLB,, | Performed by: OBSTETRICS & GYNECOLOGY

## 2021-04-28 ENCOUNTER — ROUTINE PRENATAL (OUTPATIENT)
Dept: OBSTETRICS AND GYNECOLOGY | Facility: CLINIC | Age: 36
End: 2021-04-28
Payer: COMMERCIAL

## 2021-04-28 ENCOUNTER — PROCEDURE VISIT (OUTPATIENT)
Dept: OBSTETRICS AND GYNECOLOGY | Facility: CLINIC | Age: 36
End: 2021-04-28
Payer: COMMERCIAL

## 2021-04-28 VITALS
BODY MASS INDEX: 27.16 KG/M2 | WEIGHT: 143.75 LBS | SYSTOLIC BLOOD PRESSURE: 104 MMHG | DIASTOLIC BLOOD PRESSURE: 60 MMHG

## 2021-04-28 DIAGNOSIS — O09.522 MULTIGRAVIDA OF ADVANCED MATERNAL AGE IN SECOND TRIMESTER: ICD-10-CM

## 2021-04-28 DIAGNOSIS — O09.899 SHORT INTERVAL BETWEEN PREGNANCIES AFFECTING PREGNANCY, ANTEPARTUM: Primary | ICD-10-CM

## 2021-04-28 DIAGNOSIS — Z87.59 HISTORY OF SHOULDER DYSTOCIA IN PRIOR PREGNANCY: ICD-10-CM

## 2021-04-28 DIAGNOSIS — Z87.59 HISTORY OF POSTPARTUM HEMORRHAGE: ICD-10-CM

## 2021-04-28 PROCEDURE — 99999 PR PBB SHADOW E&M-EST. PATIENT-LVL II: CPT | Mod: PBBFAC,,, | Performed by: OBSTETRICS & GYNECOLOGY

## 2021-04-28 PROCEDURE — 76816 PR  US,PREGNANT UTERUS,F/U,TRANSABD APP: ICD-10-PCS | Mod: S$GLB,,, | Performed by: OBSTETRICS & GYNECOLOGY

## 2021-04-28 PROCEDURE — 76816 OB US FOLLOW-UP PER FETUS: CPT | Mod: S$GLB,,, | Performed by: OBSTETRICS & GYNECOLOGY

## 2021-04-28 PROCEDURE — 0502F PR SUBSEQUENT PRENATAL CARE: ICD-10-PCS | Mod: CPTII,S$GLB,, | Performed by: OBSTETRICS & GYNECOLOGY

## 2021-04-28 PROCEDURE — 0502F SUBSEQUENT PRENATAL CARE: CPT | Mod: CPTII,S$GLB,, | Performed by: OBSTETRICS & GYNECOLOGY

## 2021-04-28 PROCEDURE — 99999 PR PBB SHADOW E&M-EST. PATIENT-LVL II: ICD-10-PCS | Mod: PBBFAC,,, | Performed by: OBSTETRICS & GYNECOLOGY

## 2021-05-16 ENCOUNTER — PATIENT MESSAGE (OUTPATIENT)
Dept: OBSTETRICS AND GYNECOLOGY | Facility: CLINIC | Age: 36
End: 2021-05-16

## 2021-05-27 ENCOUNTER — ROUTINE PRENATAL (OUTPATIENT)
Dept: OBSTETRICS AND GYNECOLOGY | Facility: CLINIC | Age: 36
End: 2021-05-27
Payer: COMMERCIAL

## 2021-05-27 ENCOUNTER — LAB VISIT (OUTPATIENT)
Dept: LAB | Facility: HOSPITAL | Age: 36
End: 2021-05-27
Attending: OBSTETRICS & GYNECOLOGY
Payer: COMMERCIAL

## 2021-05-27 VITALS
SYSTOLIC BLOOD PRESSURE: 128 MMHG | WEIGHT: 149.06 LBS | DIASTOLIC BLOOD PRESSURE: 64 MMHG | BODY MASS INDEX: 28.16 KG/M2

## 2021-05-27 DIAGNOSIS — Z87.59 HISTORY OF POSTPARTUM HEMORRHAGE: ICD-10-CM

## 2021-05-27 DIAGNOSIS — Z87.59 HISTORY OF SHOULDER DYSTOCIA IN PRIOR PREGNANCY: ICD-10-CM

## 2021-05-27 DIAGNOSIS — O09.523 MULTIGRAVIDA OF ADVANCED MATERNAL AGE IN THIRD TRIMESTER: ICD-10-CM

## 2021-05-27 DIAGNOSIS — O09.899 SHORT INTERVAL BETWEEN PREGNANCIES AFFECTING PREGNANCY, ANTEPARTUM: Primary | ICD-10-CM

## 2021-05-27 DIAGNOSIS — O09.522 MULTIGRAVIDA OF ADVANCED MATERNAL AGE IN SECOND TRIMESTER: ICD-10-CM

## 2021-05-27 LAB
BASOPHILS # BLD AUTO: 0.02 K/UL (ref 0–0.2)
BASOPHILS NFR BLD: 0.2 % (ref 0–1.9)
DIFFERENTIAL METHOD: ABNORMAL
EOSINOPHIL # BLD AUTO: 0.1 K/UL (ref 0–0.5)
EOSINOPHIL NFR BLD: 0.8 % (ref 0–8)
ERYTHROCYTE [DISTWIDTH] IN BLOOD BY AUTOMATED COUNT: 13.4 % (ref 11.5–14.5)
HCT VFR BLD AUTO: 36.6 % (ref 37–48.5)
HGB BLD-MCNC: 11.7 G/DL (ref 12–16)
IMM GRANULOCYTES # BLD AUTO: 0.06 K/UL (ref 0–0.04)
IMM GRANULOCYTES NFR BLD AUTO: 0.7 % (ref 0–0.5)
LYMPHOCYTES # BLD AUTO: 1.2 K/UL (ref 1–4.8)
LYMPHOCYTES NFR BLD: 13.4 % (ref 18–48)
MCH RBC QN AUTO: 29.8 PG (ref 27–31)
MCHC RBC AUTO-ENTMCNC: 32 G/DL (ref 32–36)
MCV RBC AUTO: 93 FL (ref 82–98)
MONOCYTES # BLD AUTO: 0.6 K/UL (ref 0.3–1)
MONOCYTES NFR BLD: 7.3 % (ref 4–15)
NEUTROPHILS # BLD AUTO: 6.8 K/UL (ref 1.8–7.7)
NEUTROPHILS NFR BLD: 77.6 % (ref 38–73)
NRBC BLD-RTO: 0 /100 WBC
PLATELET # BLD AUTO: 234 K/UL (ref 150–450)
PMV BLD AUTO: 12 FL (ref 9.2–12.9)
RBC # BLD AUTO: 3.93 M/UL (ref 4–5.4)
WBC # BLD AUTO: 8.74 K/UL (ref 3.9–12.7)

## 2021-05-27 PROCEDURE — 86703 HIV-1/HIV-2 1 RESULT ANTBDY: CPT | Performed by: OBSTETRICS & GYNECOLOGY

## 2021-05-27 PROCEDURE — 0502F PR SUBSEQUENT PRENATAL CARE: ICD-10-PCS | Mod: CPTII,S$GLB,, | Performed by: OBSTETRICS & GYNECOLOGY

## 2021-05-27 PROCEDURE — 99999 PR PBB SHADOW E&M-EST. PATIENT-LVL II: ICD-10-PCS | Mod: PBBFAC,,, | Performed by: OBSTETRICS & GYNECOLOGY

## 2021-05-27 PROCEDURE — 36415 COLL VENOUS BLD VENIPUNCTURE: CPT | Performed by: OBSTETRICS & GYNECOLOGY

## 2021-05-27 PROCEDURE — 99999 PR PBB SHADOW E&M-EST. PATIENT-LVL II: CPT | Mod: PBBFAC,,, | Performed by: OBSTETRICS & GYNECOLOGY

## 2021-05-27 PROCEDURE — 86592 SYPHILIS TEST NON-TREP QUAL: CPT | Performed by: OBSTETRICS & GYNECOLOGY

## 2021-05-27 PROCEDURE — 82950 GLUCOSE TEST: CPT | Performed by: OBSTETRICS & GYNECOLOGY

## 2021-05-27 PROCEDURE — 85025 COMPLETE CBC W/AUTO DIFF WBC: CPT | Performed by: OBSTETRICS & GYNECOLOGY

## 2021-05-27 PROCEDURE — 0502F SUBSEQUENT PRENATAL CARE: CPT | Mod: CPTII,S$GLB,, | Performed by: OBSTETRICS & GYNECOLOGY

## 2021-05-28 LAB
GLUCOSE SERPL-MCNC: 132 MG/DL (ref 70–140)
HIV 1+2 AB+HIV1 P24 AG SERPL QL IA: NEGATIVE
RPR SER QL: NORMAL

## 2021-06-08 ENCOUNTER — ROUTINE PRENATAL (OUTPATIENT)
Dept: OBSTETRICS AND GYNECOLOGY | Facility: CLINIC | Age: 36
End: 2021-06-08
Payer: COMMERCIAL

## 2021-06-08 VITALS — BODY MASS INDEX: 28.7 KG/M2 | DIASTOLIC BLOOD PRESSURE: 58 MMHG | SYSTOLIC BLOOD PRESSURE: 102 MMHG | WEIGHT: 151.88 LBS

## 2021-06-08 DIAGNOSIS — O09.899 SHORT INTERVAL BETWEEN PREGNANCIES AFFECTING PREGNANCY, ANTEPARTUM: Primary | ICD-10-CM

## 2021-06-08 DIAGNOSIS — Z87.59 HISTORY OF SHOULDER DYSTOCIA IN PRIOR PREGNANCY: ICD-10-CM

## 2021-06-08 DIAGNOSIS — Z87.59 HISTORY OF POSTPARTUM HEMORRHAGE: ICD-10-CM

## 2021-06-08 DIAGNOSIS — O09.523 MULTIGRAVIDA OF ADVANCED MATERNAL AGE IN THIRD TRIMESTER: ICD-10-CM

## 2021-06-08 PROCEDURE — 0502F PR SUBSEQUENT PRENATAL CARE: ICD-10-PCS | Mod: CPTII,S$GLB,, | Performed by: OBSTETRICS & GYNECOLOGY

## 2021-06-08 PROCEDURE — 99999 PR PBB SHADOW E&M-EST. PATIENT-LVL II: ICD-10-PCS | Mod: PBBFAC,,, | Performed by: OBSTETRICS & GYNECOLOGY

## 2021-06-08 PROCEDURE — 0502F SUBSEQUENT PRENATAL CARE: CPT | Mod: CPTII,S$GLB,, | Performed by: OBSTETRICS & GYNECOLOGY

## 2021-06-08 PROCEDURE — 99999 PR PBB SHADOW E&M-EST. PATIENT-LVL II: CPT | Mod: PBBFAC,,, | Performed by: OBSTETRICS & GYNECOLOGY

## 2021-06-18 ENCOUNTER — PATIENT MESSAGE (OUTPATIENT)
Dept: ADMINISTRATIVE | Facility: OTHER | Age: 36
End: 2021-06-18

## 2021-06-22 ENCOUNTER — ROUTINE PRENATAL (OUTPATIENT)
Dept: OBSTETRICS AND GYNECOLOGY | Facility: CLINIC | Age: 36
End: 2021-06-22
Payer: COMMERCIAL

## 2021-06-22 VITALS
SYSTOLIC BLOOD PRESSURE: 128 MMHG | WEIGHT: 151.69 LBS | DIASTOLIC BLOOD PRESSURE: 60 MMHG | BODY MASS INDEX: 28.66 KG/M2

## 2021-06-22 DIAGNOSIS — Z87.59 HISTORY OF SHOULDER DYSTOCIA IN PRIOR PREGNANCY: ICD-10-CM

## 2021-06-22 DIAGNOSIS — O09.899 SHORT INTERVAL BETWEEN PREGNANCIES AFFECTING PREGNANCY, ANTEPARTUM: Primary | ICD-10-CM

## 2021-06-22 DIAGNOSIS — Z87.59 HISTORY OF POSTPARTUM HEMORRHAGE: ICD-10-CM

## 2021-06-22 DIAGNOSIS — O09.523 MULTIGRAVIDA OF ADVANCED MATERNAL AGE IN THIRD TRIMESTER: ICD-10-CM

## 2021-06-22 DIAGNOSIS — O22.03 OBSTETRIC VARICOSE VEINS IN THIRD TRIMESTER: ICD-10-CM

## 2021-06-22 PROCEDURE — 0502F SUBSEQUENT PRENATAL CARE: CPT | Mod: CPTII,S$GLB,, | Performed by: OBSTETRICS & GYNECOLOGY

## 2021-06-22 PROCEDURE — 99999 PR PBB SHADOW E&M-EST. PATIENT-LVL II: ICD-10-PCS | Mod: PBBFAC,,, | Performed by: OBSTETRICS & GYNECOLOGY

## 2021-06-22 PROCEDURE — 99999 PR PBB SHADOW E&M-EST. PATIENT-LVL II: CPT | Mod: PBBFAC,,, | Performed by: OBSTETRICS & GYNECOLOGY

## 2021-06-22 PROCEDURE — 0502F PR SUBSEQUENT PRENATAL CARE: ICD-10-PCS | Mod: CPTII,S$GLB,, | Performed by: OBSTETRICS & GYNECOLOGY

## 2021-07-06 ENCOUNTER — ROUTINE PRENATAL (OUTPATIENT)
Dept: OBSTETRICS AND GYNECOLOGY | Facility: CLINIC | Age: 36
End: 2021-07-06
Payer: COMMERCIAL

## 2021-07-06 VITALS — SYSTOLIC BLOOD PRESSURE: 116 MMHG | WEIGHT: 154.56 LBS | BODY MASS INDEX: 29.2 KG/M2 | DIASTOLIC BLOOD PRESSURE: 64 MMHG

## 2021-07-06 DIAGNOSIS — O09.899 SHORT INTERVAL BETWEEN PREGNANCIES AFFECTING PREGNANCY, ANTEPARTUM: ICD-10-CM

## 2021-07-06 DIAGNOSIS — O22.03 OBSTETRIC VARICOSE VEINS IN THIRD TRIMESTER: ICD-10-CM

## 2021-07-06 DIAGNOSIS — Z87.59 HISTORY OF POSTPARTUM HEMORRHAGE: ICD-10-CM

## 2021-07-06 DIAGNOSIS — Z87.59 HISTORY OF SHOULDER DYSTOCIA IN PRIOR PREGNANCY: ICD-10-CM

## 2021-07-06 DIAGNOSIS — O09.523 MULTIGRAVIDA OF ADVANCED MATERNAL AGE IN THIRD TRIMESTER: Primary | ICD-10-CM

## 2021-07-06 PROCEDURE — 0502F PR SUBSEQUENT PRENATAL CARE: ICD-10-PCS | Mod: CPTII,S$GLB,, | Performed by: OBSTETRICS & GYNECOLOGY

## 2021-07-06 PROCEDURE — 99999 PR PBB SHADOW E&M-EST. PATIENT-LVL III: ICD-10-PCS | Mod: PBBFAC,,, | Performed by: OBSTETRICS & GYNECOLOGY

## 2021-07-06 PROCEDURE — 0502F SUBSEQUENT PRENATAL CARE: CPT | Mod: CPTII,S$GLB,, | Performed by: OBSTETRICS & GYNECOLOGY

## 2021-07-06 PROCEDURE — 99999 PR PBB SHADOW E&M-EST. PATIENT-LVL III: CPT | Mod: PBBFAC,,, | Performed by: OBSTETRICS & GYNECOLOGY

## 2021-07-14 ENCOUNTER — PATIENT MESSAGE (OUTPATIENT)
Dept: OBSTETRICS AND GYNECOLOGY | Facility: CLINIC | Age: 36
End: 2021-07-14
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
## 2021-07-17 PROBLEM — O26.899 ABDOMINAL PAIN DURING PREGNANCY: Status: ACTIVE | Noted: 2021-07-17

## 2021-07-17 PROBLEM — R10.9 ABDOMINAL PAIN DURING PREGNANCY: Status: ACTIVE | Noted: 2021-07-17

## 2021-07-20 ENCOUNTER — ROUTINE PRENATAL (OUTPATIENT)
Dept: OBSTETRICS AND GYNECOLOGY | Facility: CLINIC | Age: 36
End: 2021-07-20
Payer: COMMERCIAL

## 2021-07-20 ENCOUNTER — PATIENT MESSAGE (OUTPATIENT)
Dept: ADMINISTRATIVE | Facility: OTHER | Age: 36
End: 2021-07-20

## 2021-07-20 ENCOUNTER — PROCEDURE VISIT (OUTPATIENT)
Dept: OBSTETRICS AND GYNECOLOGY | Facility: CLINIC | Age: 36
End: 2021-07-20
Payer: COMMERCIAL

## 2021-07-20 ENCOUNTER — OFFICE VISIT (OUTPATIENT)
Dept: ORTHOPEDICS | Facility: CLINIC | Age: 36
End: 2021-07-20
Payer: COMMERCIAL

## 2021-07-20 VITALS
BODY MASS INDEX: 29.55 KG/M2 | HEIGHT: 61 IN | WEIGHT: 156.5 LBS | DIASTOLIC BLOOD PRESSURE: 76 MMHG | SYSTOLIC BLOOD PRESSURE: 110 MMHG | HEART RATE: 104 BPM

## 2021-07-20 VITALS
WEIGHT: 154.75 LBS | DIASTOLIC BLOOD PRESSURE: 64 MMHG | SYSTOLIC BLOOD PRESSURE: 112 MMHG | BODY MASS INDEX: 29.24 KG/M2

## 2021-07-20 DIAGNOSIS — Z87.59 HISTORY OF POSTPARTUM HEMORRHAGE: ICD-10-CM

## 2021-07-20 DIAGNOSIS — S92.515A NONDISPLACED FRACTURE OF PROXIMAL PHALANX OF LEFT LESSER TOE(S), INITIAL ENCOUNTER FOR CLOSED FRACTURE: ICD-10-CM

## 2021-07-20 DIAGNOSIS — Z87.59 HISTORY OF SHOULDER DYSTOCIA IN PRIOR PREGNANCY: ICD-10-CM

## 2021-07-20 DIAGNOSIS — S92.512A CLOSED DISPLACED FRACTURE OF PROXIMAL PHALANX OF LESSER TOE OF LEFT FOOT, INITIAL ENCOUNTER: Primary | ICD-10-CM

## 2021-07-20 DIAGNOSIS — O09.523 MULTIGRAVIDA OF ADVANCED MATERNAL AGE IN THIRD TRIMESTER: Primary | ICD-10-CM

## 2021-07-20 DIAGNOSIS — O26.843 UTERINE SIZE-DATE DISCREPANCY IN THIRD TRIMESTER: ICD-10-CM

## 2021-07-20 DIAGNOSIS — O22.03 OBSTETRIC VARICOSE VEINS IN THIRD TRIMESTER: ICD-10-CM
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
## 2021-07-20 DIAGNOSIS — O09.899 SHORT INTERVAL BETWEEN PREGNANCIES AFFECTING PREGNANCY, ANTEPARTUM: ICD-10-CM

## 2021-07-20 DIAGNOSIS — Z34.93 NORMAL INTRAUTERINE PREGNANCY IN THIRD TRIMESTER: Primary | ICD-10-CM

## 2021-07-20 DIAGNOSIS — O09.523 MULTIGRAVIDA OF ADVANCED MATERNAL AGE IN THIRD TRIMESTER: ICD-10-CM

## 2021-07-20 PROCEDURE — 1125F PR PAIN SEVERITY QUANTIFIED, PAIN PRESENT: ICD-10-PCS | Mod: CPTII,S$GLB,, | Performed by: PHYSICIAN ASSISTANT

## 2021-07-20 PROCEDURE — 76819 FETAL BIOPHYS PROFIL W/O NST: CPT | Mod: S$GLB,,, | Performed by: OBSTETRICS & GYNECOLOGY

## 2021-07-20 PROCEDURE — 99204 OFFICE O/P NEW MOD 45 MIN: CPT | Mod: S$GLB,,, | Performed by: PHYSICIAN ASSISTANT

## 2021-07-20 PROCEDURE — 1125F AMNT PAIN NOTED PAIN PRSNT: CPT | Mod: CPTII,S$GLB,, | Performed by: PHYSICIAN ASSISTANT

## 2021-07-20 PROCEDURE — 99999 PR PBB SHADOW E&M-EST. PATIENT-LVL II: ICD-10-PCS | Mod: PBBFAC,,, | Performed by: OBSTETRICS & GYNECOLOGY

## 2021-07-20 PROCEDURE — 76819 PR US, OB, FETAL BIOPHYSICAL, W/O NST: ICD-10-PCS | Mod: S$GLB,,, | Performed by: OBSTETRICS & GYNECOLOGY

## 2021-07-20 PROCEDURE — 99999 PR PBB SHADOW E&M-EST. PATIENT-LVL II: CPT | Mod: PBBFAC,,, | Performed by: OBSTETRICS & GYNECOLOGY

## 2021-07-20 PROCEDURE — 99999 PR PBB SHADOW E&M-EST. PATIENT-LVL III: CPT | Mod: PBBFAC,,, | Performed by: PHYSICIAN ASSISTANT

## 2021-07-20 PROCEDURE — 99999 PR PBB SHADOW E&M-EST. PATIENT-LVL III: ICD-10-PCS | Mod: PBBFAC,,, | Performed by: PHYSICIAN ASSISTANT

## 2021-07-20 PROCEDURE — 99204 PR OFFICE/OUTPT VISIT, NEW, LEVL IV, 45-59 MIN: ICD-10-PCS | Mod: S$GLB,,, | Performed by: PHYSICIAN ASSISTANT

## 2021-07-20 PROCEDURE — 76816 OB US FOLLOW-UP PER FETUS: CPT | Mod: S$GLB,,, | Performed by: OBSTETRICS & GYNECOLOGY

## 2021-07-20 PROCEDURE — 76816 PR  US,PREGNANT UTERUS,F/U,TRANSABD APP: ICD-10-PCS | Mod: S$GLB,,, | Performed by: OBSTETRICS & GYNECOLOGY

## 2021-07-20 PROCEDURE — 3008F PR BODY MASS INDEX (BMI) DOCUMENTED: ICD-10-PCS | Mod: CPTII,S$GLB,, | Performed by: PHYSICIAN ASSISTANT

## 2021-07-20 PROCEDURE — 0502F SUBSEQUENT PRENATAL CARE: CPT | Mod: CPTII,S$GLB,, | Performed by: OBSTETRICS & GYNECOLOGY

## 2021-07-20 PROCEDURE — 87081 CULTURE SCREEN ONLY: CPT | Performed by: OBSTETRICS & GYNECOLOGY

## 2021-07-20 PROCEDURE — 0502F PR SUBSEQUENT PRENATAL CARE: ICD-10-PCS | Mod: CPTII,S$GLB,, | Performed by: OBSTETRICS & GYNECOLOGY

## 2021-07-20 PROCEDURE — 3008F BODY MASS INDEX DOCD: CPT | Mod: CPTII,S$GLB,, | Performed by: PHYSICIAN ASSISTANT

## 2021-07-23 LAB — BACTERIA SPEC AEROBE CULT: NORMAL

## 2021-07-27 ENCOUNTER — ROUTINE PRENATAL (OUTPATIENT)
Dept: OBSTETRICS AND GYNECOLOGY | Facility: CLINIC | Age: 36
End: 2021-07-27
Payer: COMMERCIAL

## 2021-07-27 VITALS
WEIGHT: 154.31 LBS | BODY MASS INDEX: 29.16 KG/M2 | SYSTOLIC BLOOD PRESSURE: 112 MMHG | DIASTOLIC BLOOD PRESSURE: 60 MMHG

## 2021-07-27 DIAGNOSIS — O22.03 OBSTETRIC VARICOSE VEINS IN THIRD TRIMESTER: ICD-10-CM

## 2021-07-27 DIAGNOSIS — O09.899 SHORT INTERVAL BETWEEN PREGNANCIES AFFECTING PREGNANCY, ANTEPARTUM: Primary | ICD-10-CM

## 2021-07-27 DIAGNOSIS — O09.523 MULTIGRAVIDA OF ADVANCED MATERNAL AGE IN THIRD TRIMESTER: ICD-10-CM

## 2021-07-27 DIAGNOSIS — Z87.59 HISTORY OF SHOULDER DYSTOCIA IN PRIOR PREGNANCY: ICD-10-CM

## 2021-07-27 DIAGNOSIS — Z87.59 HISTORY OF POSTPARTUM HEMORRHAGE: ICD-10-CM

## 2021-07-27 PROBLEM — R10.9 ABDOMINAL PAIN DURING PREGNANCY: Status: RESOLVED | Noted: 2021-07-17 | Resolved: 2021-07-27

## 2021-07-27 PROBLEM — O26.899 ABDOMINAL PAIN DURING PREGNANCY: Status: RESOLVED | Noted: 2021-07-17 | Resolved: 2021-07-27

## 2021-07-27 PROCEDURE — 99999 PR PBB SHADOW E&M-EST. PATIENT-LVL II: CPT | Mod: PBBFAC,,, | Performed by: OBSTETRICS & GYNECOLOGY

## 2021-07-27 PROCEDURE — 0502F PR SUBSEQUENT PRENATAL CARE: ICD-10-PCS | Mod: CPTII,S$GLB,, | Performed by: OBSTETRICS & GYNECOLOGY

## 2021-07-27 PROCEDURE — 0502F SUBSEQUENT PRENATAL CARE: CPT | Mod: CPTII,S$GLB,, | Performed by: OBSTETRICS & GYNECOLOGY

## 2021-07-27 PROCEDURE — 99999 PR PBB SHADOW E&M-EST. PATIENT-LVL II: ICD-10-PCS | Mod: PBBFAC,,, | Performed by: OBSTETRICS & GYNECOLOGY

## 2021-07-29 ENCOUNTER — OFFICE VISIT (OUTPATIENT)
Dept: PODIATRY | Facility: CLINIC | Age: 36
End: 2021-07-29
Payer: COMMERCIAL

## 2021-07-29 ENCOUNTER — HOSPITAL ENCOUNTER (OUTPATIENT)
Dept: RADIOLOGY | Facility: HOSPITAL | Age: 36
Discharge: HOME | End: 2021-07-29
Attending: PODIATRIST
Payer: COMMERCIAL

## 2021-07-29 VITALS — WEIGHT: 154.31 LBS | HEIGHT: 61 IN | BODY MASS INDEX: 29.13 KG/M2

## 2021-07-29 DIAGNOSIS — M79.675 PAIN OF TOE OF LEFT FOOT: ICD-10-CM

## 2021-07-29 DIAGNOSIS — S92.515A NONDISPLACED FRACTURE OF PROXIMAL PHALANX OF LEFT LESSER TOE(S), INITIAL ENCOUNTER FOR CLOSED FRACTURE: Primary | ICD-10-CM

## 2021-07-29 PROCEDURE — 73630 X-RAY EXAM OF FOOT: CPT | Mod: 26,LT,, | Performed by: RADIOLOGY

## 2021-07-29 PROCEDURE — 99203 PR OFFICE/OUTPT VISIT, NEW, LEVL III, 30-44 MIN: ICD-10-PCS | Mod: S$GLB,,, | Performed by: PODIATRIST

## 2021-07-29 PROCEDURE — 3008F BODY MASS INDEX DOCD: CPT | Mod: CPTII,S$GLB,, | Performed by: PODIATRIST

## 2021-07-29 PROCEDURE — 1125F PR PAIN SEVERITY QUANTIFIED, PAIN PRESENT: ICD-10-PCS | Mod: CPTII,S$GLB,, | Performed by: PODIATRIST

## 2021-07-29 PROCEDURE — 1159F MED LIST DOCD IN RCRD: CPT | Mod: CPTII,S$GLB,, | Performed by: PODIATRIST

## 2021-07-29 PROCEDURE — 3008F PR BODY MASS INDEX (BMI) DOCUMENTED: ICD-10-PCS | Mod: CPTII,S$GLB,, | Performed by: PODIATRIST

## 2021-07-29 PROCEDURE — 99999 PR PBB SHADOW E&M-EST. PATIENT-LVL III: CPT | Mod: PBBFAC,,, | Performed by: PODIATRIST

## 2021-07-29 PROCEDURE — 73630 XR FOOT COMPLETE 3 VIEW LEFT: ICD-10-PCS | Mod: 26,LT,, | Performed by: RADIOLOGY

## 2021-07-29 PROCEDURE — 1160F RVW MEDS BY RX/DR IN RCRD: CPT | Mod: CPTII,S$GLB,, | Performed by: PODIATRIST

## 2021-07-29 PROCEDURE — 99999 PR PBB SHADOW E&M-EST. PATIENT-LVL III: ICD-10-PCS | Mod: PBBFAC,,, | Performed by: PODIATRIST

## 2021-07-29 PROCEDURE — 1159F PR MEDICATION LIST DOCUMENTED IN MEDICAL RECORD: ICD-10-PCS | Mod: CPTII,S$GLB,, | Performed by: PODIATRIST

## 2021-07-29 PROCEDURE — 73630 X-RAY EXAM OF FOOT: CPT | Mod: TC,LT

## 2021-07-29 PROCEDURE — 1125F AMNT PAIN NOTED PAIN PRSNT: CPT | Mod: CPTII,S$GLB,, | Performed by: PODIATRIST

## 2021-07-29 PROCEDURE — 1160F PR REVIEW ALL MEDS BY PRESCRIBER/CLIN PHARMACIST DOCUMENTED: ICD-10-PCS | Mod: CPTII,S$GLB,, | Performed by: PODIATRIST

## 2021-07-29 PROCEDURE — 99203 OFFICE O/P NEW LOW 30 MIN: CPT | Mod: S$GLB,,, | Performed by: PODIATRIST

## 2021-07-31 ENCOUNTER — HOSPITAL ENCOUNTER (INPATIENT)
Facility: HOSPITAL | Age: 36
LOS: 1 days | Discharge: HOME | End: 2021-08-01
Attending: OBSTETRICS & GYNECOLOGY | Admitting: OBSTETRICS & GYNECOLOGY
Payer: COMMERCIAL

## 2021-07-31 DIAGNOSIS — O42.90 AMNIOTIC FLUID LEAKING: ICD-10-CM
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
## 2021-07-31 LAB
ABO + RH BLD: NORMAL
BASOPHILS # BLD AUTO: 0.05 K/UL (ref 0–0.2)
BASOPHILS NFR BLD: 0.5 % (ref 0–1.9)
BLD GP AB SCN CELLS X3 SERPL QL: NORMAL
DIFFERENTIAL METHOD: ABNORMAL
EOSINOPHIL # BLD AUTO: 0.1 K/UL (ref 0–0.5)
EOSINOPHIL NFR BLD: 1.3 % (ref 0–8)
ERYTHROCYTE [DISTWIDTH] IN BLOOD BY AUTOMATED COUNT: 13.4 % (ref 11.5–14.5)
HCT VFR BLD AUTO: 35.8 % (ref 37–48.5)
HGB BLD-MCNC: 11.7 G/DL (ref 12–16)
IMM GRANULOCYTES # BLD AUTO: 0.07 K/UL (ref 0–0.04)
IMM GRANULOCYTES NFR BLD AUTO: 0.8 % (ref 0–0.5)
LYMPHOCYTES # BLD AUTO: 1.6 K/UL (ref 1–4.8)
LYMPHOCYTES NFR BLD: 17.3 % (ref 18–48)
MCH RBC QN AUTO: 29.6 PG (ref 27–31)
MCHC RBC AUTO-ENTMCNC: 32.7 G/DL (ref 32–36)
MCV RBC AUTO: 91 FL (ref 82–98)
MONOCYTES # BLD AUTO: 0.7 K/UL (ref 0.3–1)
MONOCYTES NFR BLD: 7.6 % (ref 4–15)
NEUTROPHILS # BLD AUTO: 6.8 K/UL (ref 1.8–7.7)
NEUTROPHILS NFR BLD: 72.5 % (ref 38–73)
NRBC BLD-RTO: 0 /100 WBC
PLATELET # BLD AUTO: 213 K/UL (ref 150–450)
PMV BLD AUTO: 11.6 FL (ref 9.2–12.9)
RBC # BLD AUTO: 3.95 M/UL (ref 4–5.4)
SARS-COV-2 RDRP RESP QL NAA+PROBE: NEGATIVE
WBC # BLD AUTO: 9.32 K/UL (ref 3.9–12.7)

## 2021-07-31 PROCEDURE — 59025 FETAL NON-STRESS TEST: CPT

## 2021-07-31 PROCEDURE — 85025 COMPLETE CBC W/AUTO DIFF WBC: CPT | Performed by: MIDWIFE

## 2021-07-31 PROCEDURE — 25000003 PHARM REV CODE 250: Performed by: ADVANCED PRACTICE MIDWIFE

## 2021-07-31 PROCEDURE — 72100002 HC LABOR CARE, 1ST 8 HOURS

## 2021-07-31 PROCEDURE — 11000001 HC ACUTE MED/SURG PRIVATE ROOM

## 2021-07-31 PROCEDURE — 59400 OBSTETRICAL CARE: CPT | Mod: AT,,, | Performed by: ADVANCED PRACTICE MIDWIFE

## 2021-07-31 PROCEDURE — 72200004 HC VAGINAL DELIVERY LEVEL I

## 2021-07-31 PROCEDURE — 59400 PR FULL ROUT OBSTE CARE,VAGINAL DELIV: ICD-10-PCS | Mod: AT,,, | Performed by: ADVANCED PRACTICE MIDWIFE

## 2021-07-31 PROCEDURE — 86900 BLOOD TYPING SEROLOGIC ABO: CPT | Performed by: MIDWIFE

## 2021-07-31 PROCEDURE — 63600175 PHARM REV CODE 636 W HCPCS: Performed by: MIDWIFE

## 2021-07-31 PROCEDURE — 86870 RBC ANTIBODY IDENTIFICATION: CPT | Performed by: MIDWIFE

## 2021-07-31 PROCEDURE — U0002 COVID-19 LAB TEST NON-CDC: HCPCS | Performed by: MIDWIFE

## 2021-07-31 RX ORDER — SODIUM CHLORIDE, SODIUM LACTATE, POTASSIUM CHLORIDE, CALCIUM CHLORIDE 600; 310; 30; 20 MG/100ML; MG/100ML; MG/100ML; MG/100ML
INJECTION, SOLUTION INTRAVENOUS CONTINUOUS
Status: DISCONTINUED | OUTPATIENT
Start: 2021-07-31 | End: 2021-07-31

## 2021-07-31 RX ORDER — CARBOPROST TROMETHAMINE 250 UG/ML
250 INJECTION, SOLUTION INTRAMUSCULAR
Status: DISCONTINUED | OUTPATIENT
Start: 2021-07-31 | End: 2021-07-31

## 2021-07-31 RX ORDER — OXYTOCIN/RINGER'S LACTATE 30/500 ML
95 PLASTIC BAG, INJECTION (ML) INTRAVENOUS ONCE
Status: DISCONTINUED | OUTPATIENT
Start: 2021-07-31 | End: 2021-08-01 | Stop reason: HOSPADM

## 2021-07-31 RX ORDER — SODIUM CHLORIDE 9 MG/ML
INJECTION, SOLUTION INTRAVENOUS
Status: DISCONTINUED | OUTPATIENT
Start: 2021-07-31 | End: 2021-07-31

## 2021-07-31 RX ORDER — METHYLERGONOVINE MALEATE 0.2 MG/ML
200 INJECTION INTRAVENOUS
Status: DISCONTINUED | OUTPATIENT
Start: 2021-07-31 | End: 2021-07-31

## 2021-07-31 RX ORDER — DIPHENHYDRAMINE HCL 25 MG
25 CAPSULE ORAL EVERY 4 HOURS PRN
Status: DISCONTINUED | OUTPATIENT
Start: 2021-07-31 | End: 2021-08-01 | Stop reason: HOSPADM

## 2021-07-31 RX ORDER — SIMETHICONE 80 MG
1 TABLET,CHEWABLE ORAL 4 TIMES DAILY PRN
Status: DISCONTINUED | OUTPATIENT
Start: 2021-07-31 | End: 2021-07-31

## 2021-07-31 RX ORDER — ACETAMINOPHEN 325 MG/1
650 TABLET ORAL EVERY 6 HOURS PRN
Status: DISCONTINUED | OUTPATIENT
Start: 2021-07-31 | End: 2021-08-01 | Stop reason: HOSPADM

## 2021-07-31 RX ORDER — PROCHLORPERAZINE EDISYLATE 5 MG/ML
5 INJECTION INTRAMUSCULAR; INTRAVENOUS EVERY 6 HOURS PRN
Status: DISCONTINUED | OUTPATIENT
Start: 2021-07-31 | End: 2021-07-31

## 2021-07-31 RX ORDER — CALCIUM CARBONATE 200(500)MG
500 TABLET,CHEWABLE ORAL 3 TIMES DAILY PRN
Status: DISCONTINUED | OUTPATIENT
Start: 2021-07-31 | End: 2021-07-31

## 2021-07-31 RX ORDER — OXYTOCIN/RINGER'S LACTATE 30/500 ML
334 PLASTIC BAG, INJECTION (ML) INTRAVENOUS ONCE
Status: COMPLETED | OUTPATIENT
Start: 2021-07-31 | End: 2021-07-31

## 2021-07-31 RX ORDER — SODIUM CHLORIDE 0.9 % (FLUSH) 0.9 %
10 SYRINGE (ML) INJECTION
Status: DISCONTINUED | OUTPATIENT
Start: 2021-07-31 | End: 2021-08-01 | Stop reason: HOSPADM

## 2021-07-31 RX ORDER — TRANEXAMIC ACID 100 MG/ML
1000 INJECTION, SOLUTION INTRAVENOUS ONCE AS NEEDED
Status: DISCONTINUED | OUTPATIENT
Start: 2021-07-31 | End: 2021-07-31

## 2021-07-31 RX ORDER — HYDROCORTISONE 25 MG/G
CREAM TOPICAL 3 TIMES DAILY PRN
Status: DISCONTINUED | OUTPATIENT
Start: 2021-07-31 | End: 2021-08-01 | Stop reason: HOSPADM

## 2021-07-31 RX ORDER — HYDROCODONE BITARTRATE AND ACETAMINOPHEN 7.5; 325 MG/1; MG/1
1 TABLET ORAL EVERY 4 HOURS PRN
Status: DISCONTINUED | OUTPATIENT
Start: 2021-07-31 | End: 2021-08-01 | Stop reason: HOSPADM

## 2021-07-31 RX ORDER — MISOPROSTOL 200 UG/1
800 TABLET ORAL
Status: DISCONTINUED | OUTPATIENT
Start: 2021-07-31 | End: 2021-07-31

## 2021-07-31 RX ORDER — PROCHLORPERAZINE EDISYLATE 5 MG/ML
5 INJECTION INTRAMUSCULAR; INTRAVENOUS EVERY 6 HOURS PRN
Status: DISCONTINUED | OUTPATIENT
Start: 2021-07-31 | End: 2021-08-01 | Stop reason: HOSPADM

## 2021-07-31 RX ORDER — PRENATAL WITH FERROUS FUM AND FOLIC ACID 3080; 920; 120; 400; 22; 1.84; 3; 20; 10; 1; 12; 200; 27; 25; 2 [IU]/1; [IU]/1; MG/1; [IU]/1; MG/1; MG/1; MG/1; MG/1; MG/1; MG/1; UG/1; MG/1; MG/1; MG/1; MG/1
1 TABLET ORAL DAILY
Status: DISCONTINUED | OUTPATIENT
Start: 2021-07-31 | End: 2021-08-01 | Stop reason: HOSPADM

## 2021-07-31 RX ORDER — HYDROCODONE BITARTRATE AND ACETAMINOPHEN 5; 325 MG/1; MG/1
1 TABLET ORAL EVERY 4 HOURS PRN
Status: DISCONTINUED | OUTPATIENT
Start: 2021-07-31 | End: 2021-08-01 | Stop reason: HOSPADM

## 2021-07-31 RX ORDER — DIPHENOXYLATE HYDROCHLORIDE AND ATROPINE SULFATE 2.5; .025 MG/1; MG/1
1 TABLET ORAL 4 TIMES DAILY PRN
Status: DISCONTINUED | OUTPATIENT
Start: 2021-07-31 | End: 2021-07-31

## 2021-07-31 RX ORDER — ONDANSETRON 8 MG/1
8 TABLET, ORALLY DISINTEGRATING ORAL EVERY 8 HOURS PRN
Status: DISCONTINUED | OUTPATIENT
Start: 2021-07-31 | End: 2021-07-31

## 2021-07-31 RX ORDER — ONDANSETRON 8 MG/1
8 TABLET, ORALLY DISINTEGRATING ORAL EVERY 8 HOURS PRN
Status: DISCONTINUED | OUTPATIENT
Start: 2021-07-31 | End: 2021-08-01 | Stop reason: HOSPADM

## 2021-07-31 RX ORDER — DOCUSATE SODIUM 100 MG/1
100 CAPSULE, LIQUID FILLED ORAL DAILY
Status: DISCONTINUED | OUTPATIENT
Start: 2021-07-31 | End: 2021-08-01 | Stop reason: HOSPADM

## 2021-07-31 RX ORDER — AMMONIA 15 % (W/V)
0.3 AMPUL (EA) INHALATION CONTINUOUS PRN
Status: DISCONTINUED | OUTPATIENT
Start: 2021-07-31 | End: 2021-08-01 | Stop reason: HOSPADM

## 2021-07-31 RX ORDER — IBUPROFEN 600 MG/1
600 TABLET ORAL EVERY 6 HOURS
Status: DISCONTINUED | OUTPATIENT
Start: 2021-07-31 | End: 2021-08-01 | Stop reason: HOSPADM

## 2021-07-31 RX ADMIN — HYDROCODONE BITARTRATE AND ACETAMINOPHEN 1 TABLET: 5; 325 TABLET ORAL at 06:07

## 2021-07-31 RX ADMIN — HYDROCODONE BITARTRATE AND ACETAMINOPHEN 1 TABLET: 5; 325 TABLET ORAL at 07:07

## 2021-07-31 RX ADMIN — IBUPROFEN 600 MG: 600 TABLET, FILM COATED ORAL at 01:07

## 2021-07-31 RX ADMIN — IBUPROFEN 600 MG: 600 TABLET, FILM COATED ORAL at 07:07

## 2021-07-31 RX ADMIN — IBUPROFEN 600 MG: 600 TABLET, FILM COATED ORAL at 08:07

## 2021-07-31 RX ADMIN — HYDROCODONE BITARTRATE AND ACETAMINOPHEN 1 TABLET: 5; 325 TABLET ORAL at 12:07

## 2021-07-31 RX ADMIN — DOCUSATE SODIUM 100 MG: 100 CAPSULE ORAL at 09:07

## 2021-07-31 RX ADMIN — Medication 334 MILLI-UNITS/MIN: at 07:07

## 2021-08-01 VITALS
HEART RATE: 85 BPM | TEMPERATURE: 99 F | OXYGEN SATURATION: 99 % | RESPIRATION RATE: 18 BRPM | DIASTOLIC BLOOD PRESSURE: 66 MMHG | SYSTOLIC BLOOD PRESSURE: 117 MMHG

## 2021-08-01 PROBLEM — O09.899 SHORT INTERVAL BETWEEN PREGNANCIES AFFECTING PREGNANCY, ANTEPARTUM: Status: RESOLVED | Noted: 2020-12-16 | Resolved: 2021-08-01

## 2021-08-01 PROBLEM — O09.529 ADVANCED MATERNAL AGE IN MULTIGRAVIDA: Status: RESOLVED | Noted: 2020-12-31 | Resolved: 2021-08-01

## 2021-08-01 PROBLEM — O22.03 OBSTETRIC VARICOSE VEINS IN THIRD TRIMESTER: Status: RESOLVED | Noted: 2021-06-22 | Resolved: 2021-08-01

## 2021-08-01 PROCEDURE — 25000003 PHARM REV CODE 250: Performed by: ADVANCED PRACTICE MIDWIFE

## 2021-08-01 RX ORDER — IBUPROFEN 600 MG/1
600 TABLET ORAL EVERY 6 HOURS PRN
Qty: 30 TABLET | Refills: 0 | Status: SHIPPED | OUTPATIENT
Start: 2021-08-01 | End: 2021-08-06

## 2021-08-01 RX ORDER — HYDROCODONE BITARTRATE AND ACETAMINOPHEN 5; 325 MG/1; MG/1
1 TABLET ORAL EVERY 6 HOURS PRN
Qty: 10 TABLET | Refills: 0 | Status: SHIPPED | OUTPATIENT
Start: 2021-08-01 | End: 2021-09-08

## 2021-08-01 RX ADMIN — HYDROCODONE BITARTRATE AND ACETAMINOPHEN 1 TABLET: 7.5; 325 TABLET ORAL at 01:08

## 2021-08-01 RX ADMIN — IBUPROFEN 600 MG: 600 TABLET, FILM COATED ORAL at 01:08

## 2021-08-01 RX ADMIN — IBUPROFEN 600 MG: 600 TABLET, FILM COATED ORAL at 02:08

## 2021-08-01 RX ADMIN — HYDROCODONE BITARTRATE AND ACETAMINOPHEN 1 TABLET: 7.5; 325 TABLET ORAL at 06:08

## 2021-08-01 RX ADMIN — IBUPROFEN 600 MG: 600 TABLET, FILM COATED ORAL at 08:08

## 2021-08-01 RX ADMIN — DOCUSATE SODIUM 100 MG: 100 CAPSULE ORAL at 08:08

## 2021-08-03 ENCOUNTER — PATIENT MESSAGE (OUTPATIENT)
Dept: ADMINISTRATIVE | Facility: OTHER | Age: 36
End: 2021-08-03

## 2021-08-06 NOTE — PROGRESS NOTES
Doing well.  No complaints.  Feels like the baby is sitting higher up, so her pelvic pressure has resolved.  Sono today: growth at 67th%, brain anatomy normal, anatomy complete  TDaP info discussed.  RTC 5 weeks for routine OB visit with T3 labs.  Pt may have to get her labs done at an outside lab due to high co-pay here.  She will find out if her co-pay will be less at an outside lab, and will let me know.     [id number] Comprehensive

## 2021-08-18 ENCOUNTER — PATIENT MESSAGE (OUTPATIENT)
Dept: OBSTETRICS AND GYNECOLOGY | Facility: CLINIC | Age: 36
End: 2021-08-18

## 2021-08-18 DIAGNOSIS — O92.79 LACTATION DISORDER, POSTPARTUM CONDITION: Primary | ICD-10-CM

## 2021-08-19 ENCOUNTER — TELEPHONE (OUTPATIENT)
Dept: SLEEP MEDICINE | Facility: OTHER | Age: 36
End: 2021-08-19

## 2021-08-31 ENCOUNTER — PATIENT MESSAGE (OUTPATIENT)
Dept: ADMINISTRATIVE | Facility: OTHER | Age: 36
End: 2021-08-31

## 2021-09-08 ENCOUNTER — POSTPARTUM VISIT (OUTPATIENT)
Dept: OBSTETRICS AND GYNECOLOGY | Facility: CLINIC | Age: 36
End: 2021-09-08
Payer: COMMERCIAL

## 2021-09-08 VITALS
DIASTOLIC BLOOD PRESSURE: 60 MMHG | HEIGHT: 61 IN | WEIGHT: 133.63 LBS | BODY MASS INDEX: 25.23 KG/M2 | SYSTOLIC BLOOD PRESSURE: 94 MMHG

## 2021-09-08 DIAGNOSIS — Z12.4 CERVICAL CANCER SCREENING: ICD-10-CM

## 2021-09-08 PROCEDURE — 0503F POSTPARTUM CARE VISIT: CPT | Mod: CPTII,S$GLB,, | Performed by: OBSTETRICS & GYNECOLOGY

## 2021-09-08 PROCEDURE — 99999 PR PBB SHADOW E&M-EST. PATIENT-LVL III: CPT | Mod: PBBFAC,,, | Performed by: OBSTETRICS & GYNECOLOGY

## 2021-09-08 PROCEDURE — 99999 PR PBB SHADOW E&M-EST. PATIENT-LVL III: ICD-10-PCS | Mod: PBBFAC,,, | Performed by: OBSTETRICS & GYNECOLOGY

## 2021-09-08 PROCEDURE — 0503F PR POSTPARTUM CARE VISIT: ICD-10-PCS | Mod: CPTII,S$GLB,, | Performed by: OBSTETRICS & GYNECOLOGY

## 2021-09-08 PROCEDURE — 88175 CYTOPATH C/V AUTO FLUID REDO: CPT | Performed by: OBSTETRICS & GYNECOLOGY

## 2021-09-08 PROCEDURE — 87624 HPV HI-RISK TYP POOLED RSLT: CPT | Performed by: OBSTETRICS & GYNECOLOGY

## 2021-09-12 LAB
CLINICAL INFO: NORMAL
CYTO CVX: NORMAL
CYTOLOGIST CVX/VAG CYTO: NORMAL
CYTOLOGIST CVX/VAG CYTO: NORMAL
CYTOLOGY CMNT CVX/VAG CYTO-IMP: NORMAL
CYTOLOGY PAP THIN PREP EXPLANATION: NORMAL
DATE OF PREVIOUS PAP: NO
DATE PREVIOUS BX: NO
GEN CATEG CVX/VAG CYTO-IMP: NORMAL
HPV I/H RISK 4 DNA CVX QL NAA+PROBE: NOT DETECTED
LMP START DATE: NORMAL
MICROORGANISM CVX/VAG CYTO: NORMAL
PATHOLOGIST CVX/VAG CYTO: NORMAL
SERVICE CMNT-IMP: NORMAL
SPECIMEN SOURCE CVX/VAG CYTO: NORMAL
STAT OF ADQ CVX/VAG CYTO-IMP: NORMAL

## 2021-09-20 ENCOUNTER — PATIENT MESSAGE (OUTPATIENT)
Dept: PODIATRY | Facility: CLINIC | Age: 36
End: 2021-09-20

## 2021-09-20 DIAGNOSIS — S92.515A NONDISPLACED FRACTURE OF PROXIMAL PHALANX OF LEFT LESSER TOE(S), INITIAL ENCOUNTER FOR CLOSED FRACTURE: Primary | ICD-10-CM

## 2021-09-22 ENCOUNTER — HOSPITAL ENCOUNTER (OUTPATIENT)
Dept: RADIOLOGY | Facility: HOSPITAL | Age: 36
Discharge: HOME | End: 2021-09-22
Attending: PODIATRIST
Payer: COMMERCIAL

## 2021-09-22 ENCOUNTER — OFFICE VISIT (OUTPATIENT)
Dept: PODIATRY | Facility: CLINIC | Age: 36
End: 2021-09-22
Payer: COMMERCIAL

## 2021-09-22 VITALS — BODY MASS INDEX: 25.23 KG/M2 | HEIGHT: 61 IN | WEIGHT: 133.63 LBS

## 2021-09-22 DIAGNOSIS — M79.675 PAIN OF TOE OF LEFT FOOT: ICD-10-CM

## 2021-09-22 DIAGNOSIS — S92.515D NONDISPLACED FRACTURE OF PROXIMAL PHALANX OF LEFT LESSER TOE(S), SUBSEQUENT ENCOUNTER FOR FRACTURE WITH ROUTINE HEALING: Primary | ICD-10-CM

## 2021-09-22 DIAGNOSIS — S92.515A NONDISPLACED FRACTURE OF PROXIMAL PHALANX OF LEFT LESSER TOE(S), INITIAL ENCOUNTER FOR CLOSED FRACTURE: ICD-10-CM

## 2021-09-22 PROCEDURE — 99999 PR PBB SHADOW E&M-EST. PATIENT-LVL III: ICD-10-PCS | Mod: PBBFAC,,, | Performed by: PODIATRIST

## 2021-09-22 PROCEDURE — 1159F MED LIST DOCD IN RCRD: CPT | Mod: CPTII,S$GLB,, | Performed by: PODIATRIST

## 2021-09-22 PROCEDURE — 99999 PR PBB SHADOW E&M-EST. PATIENT-LVL III: CPT | Mod: PBBFAC,,, | Performed by: PODIATRIST

## 2021-09-22 PROCEDURE — 1159F PR MEDICATION LIST DOCUMENTED IN MEDICAL RECORD: ICD-10-PCS | Mod: CPTII,S$GLB,, | Performed by: PODIATRIST

## 2021-09-22 PROCEDURE — 99213 PR OFFICE/OUTPT VISIT, EST, LEVL III, 20-29 MIN: ICD-10-PCS | Mod: S$GLB,,, | Performed by: PODIATRIST

## 2021-09-22 PROCEDURE — 73660 XR TOE 2 OR MORE VIEWS LEFT: ICD-10-PCS | Mod: 26,LT,, | Performed by: RADIOLOGY

## 2021-09-22 PROCEDURE — 1160F RVW MEDS BY RX/DR IN RCRD: CPT | Mod: CPTII,S$GLB,, | Performed by: PODIATRIST

## 2021-09-22 PROCEDURE — 1160F PR REVIEW ALL MEDS BY PRESCRIBER/CLIN PHARMACIST DOCUMENTED: ICD-10-PCS | Mod: CPTII,S$GLB,, | Performed by: PODIATRIST

## 2021-09-22 PROCEDURE — 73660 X-RAY EXAM OF TOE(S): CPT | Mod: TC,LT

## 2021-09-22 PROCEDURE — 3008F PR BODY MASS INDEX (BMI) DOCUMENTED: ICD-10-PCS | Mod: CPTII,S$GLB,, | Performed by: PODIATRIST

## 2021-09-22 PROCEDURE — 73660 X-RAY EXAM OF TOE(S): CPT | Mod: 26,LT,, | Performed by: RADIOLOGY

## 2021-09-22 PROCEDURE — 3008F BODY MASS INDEX DOCD: CPT | Mod: CPTII,S$GLB,, | Performed by: PODIATRIST

## 2021-09-22 PROCEDURE — 99213 OFFICE O/P EST LOW 20 MIN: CPT | Mod: S$GLB,,, | Performed by: PODIATRIST

## 2021-10-18 ENCOUNTER — PATIENT MESSAGE (OUTPATIENT)
Dept: ADMINISTRATIVE | Facility: OTHER | Age: 36
End: 2021-10-18
Payer: COMMERCIAL

## 2021-12-03 ENCOUNTER — PATIENT MESSAGE (OUTPATIENT)
Dept: OBSTETRICS AND GYNECOLOGY | Facility: CLINIC | Age: 36
End: 2021-12-03
Payer: COMMERCIAL

## 2021-12-08 ENCOUNTER — OFFICE VISIT (OUTPATIENT)
Dept: INTERNAL MEDICINE | Facility: CLINIC | Age: 36
End: 2021-12-08
Payer: COMMERCIAL

## 2021-12-08 VITALS
BODY MASS INDEX: 26.73 KG/M2 | DIASTOLIC BLOOD PRESSURE: 72 MMHG | WEIGHT: 141.56 LBS | OXYGEN SATURATION: 98 % | HEART RATE: 77 BPM | HEIGHT: 61 IN | TEMPERATURE: 98 F | SYSTOLIC BLOOD PRESSURE: 110 MMHG

## 2021-12-08 DIAGNOSIS — B37.2 CANDIDAL SKIN INFECTION: ICD-10-CM

## 2021-12-08 DIAGNOSIS — K60.2 ANAL FISSURE: Primary | ICD-10-CM

## 2021-12-08 PROCEDURE — 99999 PR PBB SHADOW E&M-EST. PATIENT-LVL III: ICD-10-PCS | Mod: PBBFAC,,, | Performed by: PHYSICIAN ASSISTANT

## 2021-12-08 PROCEDURE — 99213 PR OFFICE/OUTPT VISIT, EST, LEVL III, 20-29 MIN: ICD-10-PCS | Mod: S$GLB,,, | Performed by: PHYSICIAN ASSISTANT

## 2021-12-08 PROCEDURE — 99213 OFFICE O/P EST LOW 20 MIN: CPT | Mod: S$GLB,,, | Performed by: PHYSICIAN ASSISTANT

## 2021-12-08 PROCEDURE — 99999 PR PBB SHADOW E&M-EST. PATIENT-LVL III: CPT | Mod: PBBFAC,,, | Performed by: PHYSICIAN ASSISTANT

## 2021-12-08 RX ORDER — HYDROCORTISONE 25 MG/G
CREAM TOPICAL 2 TIMES DAILY
Qty: 28 G | Refills: 1 | Status: SHIPPED | OUTPATIENT
Start: 2021-12-08 | End: 2023-01-31 | Stop reason: ALTCHOICE

## 2021-12-08 RX ORDER — NYSTATIN 100000 U/G
CREAM TOPICAL 2 TIMES DAILY
Qty: 30 G | Refills: 0 | Status: SHIPPED | OUTPATIENT
Start: 2021-12-08 | End: 2023-01-31 | Stop reason: ALTCHOICE

## 2022-01-16 ENCOUNTER — PATIENT MESSAGE (OUTPATIENT)
Dept: ADMINISTRATIVE | Facility: OTHER | Age: 37
End: 2022-01-16
Payer: COMMERCIAL

## 2022-03-25 ENCOUNTER — PATIENT MESSAGE (OUTPATIENT)
Dept: OBSTETRICS AND GYNECOLOGY | Facility: CLINIC | Age: 37
End: 2022-03-25
Payer: COMMERCIAL

## 2022-03-25 DIAGNOSIS — R10.2 PELVIC PRESSURE IN FEMALE: ICD-10-CM

## 2022-06-06 ENCOUNTER — OFFICE VISIT (OUTPATIENT)
Dept: OBSTETRICS AND GYNECOLOGY | Facility: CLINIC | Age: 37
End: 2022-06-06
Payer: COMMERCIAL

## 2022-06-06 VITALS
DIASTOLIC BLOOD PRESSURE: 72 MMHG | SYSTOLIC BLOOD PRESSURE: 134 MMHG | WEIGHT: 142.44 LBS | BODY MASS INDEX: 26.89 KG/M2 | HEIGHT: 61 IN

## 2022-06-06 DIAGNOSIS — R58: Primary | ICD-10-CM

## 2022-06-06 PROCEDURE — 1159F PR MEDICATION LIST DOCUMENTED IN MEDICAL RECORD: ICD-10-PCS | Mod: CPTII,S$GLB,, | Performed by: NURSE PRACTITIONER

## 2022-06-06 PROCEDURE — 3078F PR MOST RECENT DIASTOLIC BLOOD PRESSURE < 80 MM HG: ICD-10-PCS | Mod: CPTII,S$GLB,, | Performed by: NURSE PRACTITIONER

## 2022-06-06 PROCEDURE — 99213 OFFICE O/P EST LOW 20 MIN: CPT | Mod: S$GLB,,, | Performed by: NURSE PRACTITIONER

## 2022-06-06 PROCEDURE — 3075F PR MOST RECENT SYSTOLIC BLOOD PRESS GE 130-139MM HG: ICD-10-PCS | Mod: CPTII,S$GLB,, | Performed by: NURSE PRACTITIONER

## 2022-06-06 PROCEDURE — 1160F RVW MEDS BY RX/DR IN RCRD: CPT | Mod: CPTII,S$GLB,, | Performed by: NURSE PRACTITIONER

## 2022-06-06 PROCEDURE — 1160F PR REVIEW ALL MEDS BY PRESCRIBER/CLIN PHARMACIST DOCUMENTED: ICD-10-PCS | Mod: CPTII,S$GLB,, | Performed by: NURSE PRACTITIONER

## 2022-06-06 PROCEDURE — 1159F MED LIST DOCD IN RCRD: CPT | Mod: CPTII,S$GLB,, | Performed by: NURSE PRACTITIONER

## 2022-06-06 PROCEDURE — 99999 PR PBB SHADOW E&M-EST. PATIENT-LVL III: CPT | Mod: PBBFAC,,, | Performed by: NURSE PRACTITIONER

## 2022-06-06 PROCEDURE — 3075F SYST BP GE 130 - 139MM HG: CPT | Mod: CPTII,S$GLB,, | Performed by: NURSE PRACTITIONER

## 2022-06-06 PROCEDURE — 3078F DIAST BP <80 MM HG: CPT | Mod: CPTII,S$GLB,, | Performed by: NURSE PRACTITIONER

## 2022-06-06 PROCEDURE — 99213 PR OFFICE/OUTPT VISIT, EST, LEVL III, 20-29 MIN: ICD-10-PCS | Mod: S$GLB,,, | Performed by: NURSE PRACTITIONER

## 2022-06-06 PROCEDURE — 99999 PR PBB SHADOW E&M-EST. PATIENT-LVL III: ICD-10-PCS | Mod: PBBFAC,,, | Performed by: NURSE PRACTITIONER

## 2022-06-06 PROCEDURE — 3008F PR BODY MASS INDEX (BMI) DOCUMENTED: ICD-10-PCS | Mod: CPTII,S$GLB,, | Performed by: NURSE PRACTITIONER

## 2022-06-06 PROCEDURE — 3008F BODY MASS INDEX DOCD: CPT | Mod: CPTII,S$GLB,, | Performed by: NURSE PRACTITIONER

## 2022-06-06 NOTE — PROGRESS NOTES
Subjective:       Patient ID: Julienne Rosa is a 37 y.o. female.    Chief Complaint:  No chief complaint on file.    Patient's last menstrual period was 2022.  History of Present Illness  Patient presents to clinic with complaints of dark spot on left labium.  Patient 1st noticed this about 4 days ago.  Patient reports mild discomfort with direct pressure on area although denies significant pain.  Patient with history of varicose veins to labia with previous pregnancies.    OB History    Para Term  AB Living   3 3 3     3   SAB IAB Ectopic Multiple Live Births         0 3      # Outcome Date GA Lbr Will/2nd Weight Sex Delivery Anes PTL Lv   3 Term 21 38w1d  3.373 kg (7 lb 7 oz) F Vag-Spont None N PEYTON   2 Term 20 38w5d  3.47 kg (7 lb 10.4 oz) M Vag-Spont EPI N PEYTON   1 Term 11/24/15 39w3d / 01:59 3.29 kg (7 lb 4.1 oz) M Vag-Spont EPI N PEYTON      Complications: Shoulder Dystocia       Review of Systems  Review of Systems   Constitutional: Negative for appetite change, fatigue and fever.   Gastrointestinal: Negative for abdominal pain, bloating, constipation, diarrhea, nausea and vomiting.   Genitourinary: Negative for bladder incontinence, dysmenorrhea, dyspareunia, dysuria, flank pain, frequency, genital sores, menorrhagia, menstrual problem, pelvic pain, urgency, vaginal bleeding, vaginal discharge, vaginal pain, postcoital bleeding, vaginal dryness and vaginal odor.        Vaginal discomfort, vaginal discoloration   All other systems reviewed and are negative.           Objective:      Physical Exam:   Constitutional: She is oriented to person, place, and time. She appears well-developed and well-nourished.    HENT:   Head: Normocephalic and atraumatic.    Eyes: Pupils are equal, round, and reactive to light. Conjunctivae and EOM are normal.     Cardiovascular: Normal rate, regular rhythm and normal heart sounds.     Pulmonary/Chest: Effort normal.        Abdominal: Soft. There  is no abdominal tenderness. Hernia confirmed negative in the right inguinal area and confirmed negative in the left inguinal area.     Genitourinary:    Inguinal canal and rectum normal.            Pelvic exam was performed with patient supine.   The external female genitalia was normal.   Genitalia hair distrobution normal .   Labial bartholins normal.There is no rash, tenderness, lesion or injury on the right labia. There is no rash or lesion on the left labia.    Genitourinary Comments: deferred             Musculoskeletal: Normal range of motion and moves all extremeties.      Lymphadenopathy: No inguinal adenopathy noted on the right or left side.    Neurological: She is alert and oriented to person, place, and time.    Skin: Skin is warm and dry. She is not diaphoretic.    Psychiatric: She has a normal mood and affect. Her behavior is normal. Judgment and thought content normal.            Assessment:     1. Rupture of blood vessel          Plan:   Diagnoses and all orders for this visit:    Rupture of blood vessel      Recommend expectant management over the next month. If no improvement or worsening of symptoms, RTC for further evaluation.

## 2022-08-16 ENCOUNTER — PATIENT MESSAGE (OUTPATIENT)
Dept: OBSTETRICS AND GYNECOLOGY | Facility: CLINIC | Age: 37
End: 2022-08-16

## 2022-08-16 DIAGNOSIS — B37.31 VULVOVAGINAL CANDIDIASIS: Primary | ICD-10-CM

## 2022-08-17 ENCOUNTER — PATIENT MESSAGE (OUTPATIENT)
Dept: OBSTETRICS AND GYNECOLOGY | Facility: CLINIC | Age: 37
End: 2022-08-17

## 2022-08-17 RX ORDER — FLUCONAZOLE 150 MG/1
150 TABLET ORAL ONCE
Qty: 1 TABLET | Refills: 0 | Status: SHIPPED | OUTPATIENT
Start: 2022-08-17 | End: 2022-08-17

## 2022-08-17 NOTE — TELEPHONE ENCOUNTER
Pt tested postive for covid on Monday and has tried monostat for vaginal itch and it is not helping. Pt would like for you to call in RX. Please advise.

## 2023-01-31 ENCOUNTER — OFFICE VISIT (OUTPATIENT)
Dept: INTERNAL MEDICINE | Facility: CLINIC | Age: 38
End: 2023-01-31
Payer: COMMERCIAL

## 2023-01-31 VITALS
OXYGEN SATURATION: 99 % | RESPIRATION RATE: 18 BRPM | TEMPERATURE: 98 F | SYSTOLIC BLOOD PRESSURE: 116 MMHG | DIASTOLIC BLOOD PRESSURE: 82 MMHG | HEART RATE: 80 BPM | WEIGHT: 139.13 LBS | BODY MASS INDEX: 26.28 KG/M2

## 2023-01-31 DIAGNOSIS — J02.9 PHARYNGITIS, UNSPECIFIED ETIOLOGY: ICD-10-CM

## 2023-01-31 DIAGNOSIS — J06.9 URI WITH COUGH AND CONGESTION: Primary | ICD-10-CM

## 2023-01-31 LAB — GROUP A STREP, MOLECULAR: NEGATIVE

## 2023-01-31 PROCEDURE — 1160F RVW MEDS BY RX/DR IN RCRD: CPT | Mod: CPTII,S$GLB,,

## 2023-01-31 PROCEDURE — 3079F DIAST BP 80-89 MM HG: CPT | Mod: CPTII,S$GLB,,

## 2023-01-31 PROCEDURE — 99214 PR OFFICE/OUTPT VISIT, EST, LEVL IV, 30-39 MIN: ICD-10-PCS | Mod: S$GLB,,,

## 2023-01-31 PROCEDURE — 1159F MED LIST DOCD IN RCRD: CPT | Mod: CPTII,S$GLB,,

## 2023-01-31 PROCEDURE — 87651 STREP A DNA AMP PROBE: CPT

## 2023-01-31 PROCEDURE — 3079F PR MOST RECENT DIASTOLIC BLOOD PRESSURE 80-89 MM HG: ICD-10-PCS | Mod: CPTII,S$GLB,,

## 2023-01-31 PROCEDURE — 99214 OFFICE O/P EST MOD 30 MIN: CPT | Mod: S$GLB,,,

## 2023-01-31 PROCEDURE — 3074F PR MOST RECENT SYSTOLIC BLOOD PRESSURE < 130 MM HG: ICD-10-PCS | Mod: CPTII,S$GLB,,

## 2023-01-31 PROCEDURE — 3008F PR BODY MASS INDEX (BMI) DOCUMENTED: ICD-10-PCS | Mod: CPTII,S$GLB,,

## 2023-01-31 PROCEDURE — 99999 PR PBB SHADOW E&M-EST. PATIENT-LVL III: CPT | Mod: PBBFAC,,,

## 2023-01-31 PROCEDURE — 3008F BODY MASS INDEX DOCD: CPT | Mod: CPTII,S$GLB,,

## 2023-01-31 PROCEDURE — 99999 PR PBB SHADOW E&M-EST. PATIENT-LVL III: ICD-10-PCS | Mod: PBBFAC,,,

## 2023-01-31 PROCEDURE — 3074F SYST BP LT 130 MM HG: CPT | Mod: CPTII,S$GLB,,

## 2023-01-31 PROCEDURE — 1159F PR MEDICATION LIST DOCUMENTED IN MEDICAL RECORD: ICD-10-PCS | Mod: CPTII,S$GLB,,

## 2023-01-31 PROCEDURE — 1160F PR REVIEW ALL MEDS BY PRESCRIBER/CLIN PHARMACIST DOCUMENTED: ICD-10-PCS | Mod: CPTII,S$GLB,,

## 2023-01-31 RX ORDER — METHYLPREDNISOLONE 4 MG/1
TABLET ORAL
Qty: 21 EACH | Refills: 0 | Status: SHIPPED | OUTPATIENT
Start: 2023-01-31 | End: 2023-02-21

## 2023-01-31 RX ORDER — FLUTICASONE PROPIONATE 50 MCG
1 SPRAY, SUSPENSION (ML) NASAL DAILY
Qty: 16 G | Refills: 0 | Status: SHIPPED | OUTPATIENT
Start: 2023-01-31

## 2023-01-31 RX ORDER — LEVOCETIRIZINE DIHYDROCHLORIDE 5 MG/1
5 TABLET, FILM COATED ORAL NIGHTLY
Qty: 30 TABLET | Refills: 0 | Status: SHIPPED | OUTPATIENT
Start: 2023-01-31 | End: 2024-01-31

## 2023-01-31 NOTE — PROGRESS NOTES
Julienne Diazz  01/31/2023  [id number]    MICHELLE Dillard MD  Patient Care Team:  MICHELLE Dillard MD as PCP - General (Family Medicine)          Visit Type:an urgent visit for a new problem    Chief Complaint:  Chief Complaint   Patient presents with    Sore Throat     Sore throat, cough and congestion with negative at home covid test. Advises that her family has all been sick, but no one has tested positive. Wants to check for strep.        History of Present Illness:    She has been congested for a week  Cough and sore throat over the weekend  Has some pain with swallowing  Denies fever or SOB  Has taken 2 At home COVID test and both were negative  Kids have been sick recently with runny nose/cough/congestion      History:  Past Medical History:   Diagnosis Date    Abnormal Pap smear of cervix     Abnormal Pap smear of vagina     x 2-had 2 normal colposcopies//paps have been normal since    Mental disorder     depression and anxiety    Seasonal allergic rhinitis due to pollen 2/2/2019     Past Surgical History:   Procedure Laterality Date    MOUTH SURGERY      tonsilectomy       Family History   Problem Relation Age of Onset    No Known Problems Paternal Grandfather     No Known Problems Paternal Grandmother     No Known Problems Maternal Grandmother     No Known Problems Maternal Grandfather     No Known Problems Father     No Known Problems Mother     No Known Problems Brother     No Known Problems Sister     Breast cancer Neg Hx     Colon cancer Neg Hx     Ovarian cancer Neg Hx      Social History     Socioeconomic History    Marital status:    Tobacco Use    Smoking status: Never    Smokeless tobacco: Never   Substance and Sexual Activity    Alcohol use: Not Currently     Comment: socially    Drug use: No    Sexual activity: Yes     Partners: Male     Patient Active Problem List   Diagnosis    History of postpartum hemorrhage    History of shoulder dystocia in prior pregnancy    Pelvic pressure  in female     Review of patient's allergies indicates:   Allergen Reactions    Ceclor [cefaclor] Other (See Comments)     unknown    Augmentin [amoxicillin-pot clavulanate] Hives    Sulfa (sulfonamide antibiotics) Rash       The following were reviewed at this visit: active problem list, medication list, allergies, family history, social history, and health maintenance.    Medications:  Current Outpatient Medications on File Prior to Visit   Medication Sig Dispense Refill    Lactobacillus rhamnosus GG (CULTURELLE) 10 billion cell capsule Take 1 capsule by mouth once daily.      multivitamin capsule Take 1 capsule by mouth once daily.      hydrocortisone 2.5 % cream Apply topically 2 (two) times daily. 28 g 1    nystatin (MYCOSTATIN) cream Apply topically 2 (two) times daily. 30 g 0    prenatal vits62/FA/om3/dha/epa (PRENATAL GUMMY ORAL)        No current facility-administered medications on file prior to visit.       Medications have been reviewed and reconciled with patient at this visit.  Barriers to medications reviewed with patient.    Adverse reactions to current medications reviewed with patient..    Over the counter medications reviewed and reconciled with patient.    Exam:  Wt Readings from Last 3 Encounters:   01/31/23 63.1 kg (139 lb 1.8 oz)   06/06/22 64.6 kg (142 lb 6.7 oz)   12/08/21 64.2 kg (141 lb 8.6 oz)     Temp Readings from Last 3 Encounters:   01/31/23 97.6 °F (36.4 °C)   12/08/21 98.2 °F (36.8 °C) (Tympanic)   08/01/21 98.5 °F (36.9 °C) (Axillary)     BP Readings from Last 3 Encounters:   01/31/23 116/82   06/06/22 134/72   12/08/21 110/72     Pulse Readings from Last 3 Encounters:   01/31/23 80   12/08/21 77   08/01/21 85     Body mass index is 26.28 kg/m².      Review of Systems   Constitutional:  Positive for malaise/fatigue.   HENT:  Positive for congestion and sore throat.    Respiratory:  Positive for cough. Negative for shortness of breath and wheezing.    Cardiovascular:  Negative for  chest pain and palpitations.   Physical Exam  Vitals and nursing note reviewed.   Constitutional:       General: She is not in acute distress.     Appearance: She is well-developed. She is not diaphoretic.   HENT:      Head: Normocephalic and atraumatic.      Salivary Glands: Right salivary gland is tender. Right salivary gland is not diffusely enlarged. Left salivary gland is tender. Left salivary gland is not diffusely enlarged.      Right Ear: Tympanic membrane and external ear normal.      Left Ear: Tympanic membrane and external ear normal.      Nose: Mucosal edema, congestion and rhinorrhea present.      Right Sinus: No maxillary sinus tenderness or frontal sinus tenderness.      Left Sinus: No maxillary sinus tenderness or frontal sinus tenderness.      Mouth/Throat:      Pharynx: Uvula midline. Pharyngeal swelling present. No uvula swelling.   Eyes:      General:         Right eye: No discharge.         Left eye: No discharge.      Conjunctiva/sclera: Conjunctivae normal.      Pupils: Pupils are equal, round, and reactive to light.   Cardiovascular:      Rate and Rhythm: Normal rate and regular rhythm.      Heart sounds: Normal heart sounds. No murmur heard.  Pulmonary:      Effort: Pulmonary effort is normal. No respiratory distress.      Breath sounds: Normal breath sounds. No wheezing.   Neurological:      Mental Status: She is alert and oriented to person, place, and time.   Psychiatric:         Behavior: Behavior normal.         Thought Content: Thought content normal.         Judgment: Judgment normal.       Laboratory Reviewed ({Yes)  Lab Results   Component Value Date    WBC 9.32 07/31/2021    HGB 11.7 (L) 07/31/2021    HCT 35.8 (L) 07/31/2021     07/31/2021    CHOL 163 01/31/2017    TRIG 97 01/31/2017    HDL 46 01/31/2017    ALT 21 05/07/2020    AST 16 05/07/2020     05/07/2020    K 3.8 05/07/2020     05/07/2020    CREATININE 0.7 05/07/2020    BUN 15 05/07/2020    CO2 22 (L)  05/07/2020    TSH 1.554 01/31/2017    GLUF 86 04/01/2015         Julienne was seen today for sore throat.    Diagnoses and all orders for this visit:    URI with cough and congestion  -     methylPREDNISolone (MEDROL DOSEPACK) 4 mg tablet; Take as directed  -     levocetirizine (XYZAL) 5 MG tablet; Take 1 tablet (5 mg total) by mouth every evening.  -     fluticasone propionate (FLONASE) 50 mcg/actuation nasal spray; Shake well and 1 spray (50 mcg total) by Each Nostril route once daily.    Pharyngitis, unspecified etiology  -     Group A Strep, Molecular  -     diphenhydrAMINE-aluminum-magnesium hydroxide-simethicone-LIDOcaine HCl 2%; Swish and spit 15 mLs every 6 (six) hours as needed (throat pain).      Instructions  Drink plenty of fluids  Get plenty of Rest  Take OTC tylenol and or ibuprofen as directed on package for pain/fever. Do not take more than package suggests to take  Follow up with your PCP in 1 week if symptoms persist   Gargle with warm water and salt and use warm liquids to help with throat pain      Will schedule an establish care appointment with Dr. Simmons at later time       Care Plan/Goals: Reviewed    Goals    None         Follow up: No follow-ups on file.    After visit summary was printed and given to patient upon discharge today.  Patient goals and care plan are included in After Visit Summary.

## 2023-02-04 ENCOUNTER — PATIENT MESSAGE (OUTPATIENT)
Dept: INTERNAL MEDICINE | Facility: CLINIC | Age: 38
End: 2023-02-04
Payer: COMMERCIAL

## 2023-02-05 ENCOUNTER — PATIENT MESSAGE (OUTPATIENT)
Dept: INTERNAL MEDICINE | Facility: CLINIC | Age: 38
End: 2023-02-05
Payer: COMMERCIAL

## 2023-02-06 ENCOUNTER — OFFICE VISIT (OUTPATIENT)
Dept: INTERNAL MEDICINE | Facility: CLINIC | Age: 38
End: 2023-02-06
Payer: COMMERCIAL

## 2023-02-06 DIAGNOSIS — R05.9 COUGH, UNSPECIFIED TYPE: ICD-10-CM

## 2023-02-06 DIAGNOSIS — R09.81 SINUS CONGESTION: Primary | ICD-10-CM

## 2023-02-06 PROCEDURE — 99214 OFFICE O/P EST MOD 30 MIN: CPT | Mod: 95,,, | Performed by: NURSE PRACTITIONER

## 2023-02-06 PROCEDURE — 99214 PR OFFICE/OUTPT VISIT, EST, LEVL IV, 30-39 MIN: ICD-10-PCS | Mod: 95,,, | Performed by: NURSE PRACTITIONER

## 2023-02-06 RX ORDER — PROMETHAZINE HYDROCHLORIDE AND DEXTROMETHORPHAN HYDROBROMIDE 6.25; 15 MG/5ML; MG/5ML
10 SYRUP ORAL EVERY 4 HOURS PRN
Qty: 240 ML | Refills: 0 | Status: SHIPPED | OUTPATIENT
Start: 2023-02-06 | End: 2023-02-16

## 2023-02-06 RX ORDER — AZITHROMYCIN 250 MG/1
TABLET, FILM COATED ORAL
Qty: 6 TABLET | Refills: 0 | Status: SHIPPED | OUTPATIENT
Start: 2023-02-06 | End: 2023-03-28

## 2023-02-06 RX ORDER — DEXBROMPHENIRAMINE MALEATE AND PHENYLEPHRINE HYDROCHLORIDE 2; 10 MG/1; MG/1
1 TABLET ORAL EVERY 6 HOURS PRN
Qty: 28 TABLET | Refills: 1 | Status: SHIPPED | OUTPATIENT
Start: 2023-02-06 | End: 2023-02-20

## 2023-02-06 NOTE — PROGRESS NOTES
Julienne Marcano Shelley  03/06/2023  [id number]    MICHELLE Dillard MD  Patient Care Team:  MICHELLE Dillard MD as PCP - General (Family Medicine)          Visit Type:an urgent visit for a new problem    Chief Complaint:  No chief complaint on file.      History of Present Illness:    38 yo female presents with co cough, throat pain, sinus congestion for 4 days.   Pt was seen and was tested strep with negative.  \Pt denies fever, chills, sweats, CP, SOB, NVD, abdominal pain, fatigue, headache, body aches, loss of taste or smell.     History:  Past Medical History:   Diagnosis Date    Abnormal Pap smear of cervix     Abnormal Pap smear of vagina     x 2-had 2 normal colposcopies//paps have been normal since    Mental disorder     depression and anxiety    Seasonal allergic rhinitis due to pollen 2/2/2019     Past Surgical History:   Procedure Laterality Date    MOUTH SURGERY      tonsilectomy       Family History   Problem Relation Age of Onset    No Known Problems Paternal Grandfather     No Known Problems Paternal Grandmother     No Known Problems Maternal Grandmother     No Known Problems Maternal Grandfather     No Known Problems Father     No Known Problems Mother     No Known Problems Brother     No Known Problems Sister     Breast cancer Neg Hx     Colon cancer Neg Hx     Ovarian cancer Neg Hx      Social History     Socioeconomic History    Marital status:    Tobacco Use    Smoking status: Never    Smokeless tobacco: Never   Substance and Sexual Activity    Alcohol use: Not Currently     Comment: socially    Drug use: No    Sexual activity: Yes     Partners: Male     Patient Active Problem List   Diagnosis    History of postpartum hemorrhage    History of shoulder dystocia in prior pregnancy    Pelvic pressure in female     Review of patient's allergies indicates:   Allergen Reactions    Ceclor [cefaclor] Other (See Comments)     unknown    Augmentin [amoxicillin-pot clavulanate] Hives    Sulfa  (sulfonamide antibiotics) Rash       The following were reviewed at this visit: active problem list, medication list, allergies, family history, social history, and health maintenance.    Medications:  Current Outpatient Medications on File Prior to Visit   Medication Sig Dispense Refill    diphenhydrAMINE-aluminum-magnesium hydroxide-simethicone-LIDOcaine HCl 2% Swish and spit 15 mLs every 6 (six) hours as needed (throat pain). 100 each 0    fluticasone propionate (FLONASE) 50 mcg/actuation nasal spray Shake well and 1 spray (50 mcg total) by Each Nostril route once daily. 16 g 0    Lactobacillus rhamnosus GG (CULTURELLE) 10 billion cell capsule Take 1 capsule by mouth once daily.      levocetirizine (XYZAL) 5 MG tablet Take 1 tablet (5 mg total) by mouth every evening. 30 tablet 0    LIDOcaine HCl 2%-aluminum & magnesium hydroxide-simethicone-diphenhydrAMINE Swish and spit 15ml by mouth every 6 hours as needed for throat pain 420 mL 0    multivitamin capsule Take 1 capsule by mouth once daily.      prenatal vits62/FA/om3/dha/epa (PRENATAL GUMMY ORAL)        No current facility-administered medications on file prior to visit.       Medications have been reviewed and reconciled with patient at this visit.  Barriers to medications reviewed with patient.    Adverse reactions to current medications reviewed with patient..    Over the counter medications reviewed and reconciled with patient.    Exam:  Wt Readings from Last 3 Encounters:   01/31/23 63.1 kg (139 lb 1.8 oz)   06/06/22 64.6 kg (142 lb 6.7 oz)   12/08/21 64.2 kg (141 lb 8.6 oz)     Temp Readings from Last 3 Encounters:   01/31/23 97.6 °F (36.4 °C)   12/08/21 98.2 °F (36.8 °C) (Tympanic)   08/01/21 98.5 °F (36.9 °C) (Axillary)     BP Readings from Last 3 Encounters:   01/31/23 116/82   06/06/22 134/72   12/08/21 110/72     Pulse Readings from Last 3 Encounters:   01/31/23 80   12/08/21 77   08/01/21 85     There is no height or weight on file to calculate  BMI.      Review of Systems   HENT:  Positive for congestion, ear pain and sore throat. Negative for ear discharge.    Respiratory:  Positive for cough. Negative for shortness of breath and stridor.    Gastrointestinal:  Negative for abdominal pain, diarrhea and vomiting.   Musculoskeletal:  Negative for neck pain.   Neurological:  Positive for headaches.   Physical Exam  Vitals and nursing note reviewed.   Constitutional:       Appearance: Normal appearance.   Eyes:      Conjunctiva/sclera: Conjunctivae normal.   Pulmonary:      Effort: Pulmonary effort is normal.   Neurological:      General: No focal deficit present.      Mental Status: She is alert and oriented to person, place, and time.   Psychiatric:         Mood and Affect: Mood normal.         Behavior: Behavior normal.         Thought Content: Thought content normal.         Judgment: Judgment normal.       Laboratory Reviewed ({Yes)  Lab Results   Component Value Date    WBC 9.32 07/31/2021    HGB 11.7 (L) 07/31/2021    HCT 35.8 (L) 07/31/2021     07/31/2021    CHOL 163 01/31/2017    TRIG 97 01/31/2017    HDL 46 01/31/2017    ALT 21 05/07/2020    AST 16 05/07/2020     05/07/2020    K 3.8 05/07/2020     05/07/2020    CREATININE 0.7 05/07/2020    BUN 15 05/07/2020    CO2 22 (L) 05/07/2020    TSH 1.554 01/31/2017    GLUF 86 04/01/2015       Diagnoses and all orders for this visit:    Sinus congestion  -     azithromycin (Z-SELVIN) 250 MG tablet; Take as directed  -     dexbrompheniramine-phenyleph (ALA-HIST PE) 2-10 mg Tab; Take 1 tablet by mouth every 6 (six) hours as needed (sinus congestion).    Cough, unspecified type  -     promethazine-dextromethorphan (PROMETHAZINE-DM) 6.25-15 mg/5 mL Syrp; Take 10 mLs by mouth every 4 (four) hours as needed.          Care Plan/Goals: Reviewed    Goals    None     Diagnoses and all orders for this visit:    Sinus congestion  -     azithromycin (Z-SELVIN) 250 MG tablet; Take as directed  -      dexbrompheniramine-phenyleph (ALA-HIST PE) 2-10 mg Tab; Take 1 tablet by mouth every 6 (six) hours as needed (sinus congestion).    Cough, unspecified type  -     promethazine-dextromethorphan (PROMETHAZINE-DM) 6.25-15 mg/5 mL Syrp; Take 10 mLs by mouth every 4 (four) hours as needed.         Follow up: Follow up if symptoms worsen or fail to improve, for with your PCP.    After visit summary was printed and given to patient upon discharge today.  Patient goals and care plan are included in After Visit Summary.

## 2023-03-13 ENCOUNTER — OFFICE VISIT (OUTPATIENT)
Dept: OBSTETRICS AND GYNECOLOGY | Facility: CLINIC | Age: 38
End: 2023-03-13
Payer: COMMERCIAL

## 2023-03-13 VITALS
DIASTOLIC BLOOD PRESSURE: 70 MMHG | HEIGHT: 61 IN | BODY MASS INDEX: 27.06 KG/M2 | WEIGHT: 143.31 LBS | SYSTOLIC BLOOD PRESSURE: 115 MMHG

## 2023-03-13 DIAGNOSIS — R10.2 PELVIC PRESSURE IN FEMALE: ICD-10-CM

## 2023-03-13 DIAGNOSIS — Z01.419 WELL WOMAN EXAM WITH ROUTINE GYNECOLOGICAL EXAM: Primary | ICD-10-CM

## 2023-03-13 DIAGNOSIS — N76.3 SUBACUTE VULVITIS: ICD-10-CM

## 2023-03-13 PROBLEM — Z87.59 HISTORY OF POSTPARTUM HEMORRHAGE: Status: RESOLVED | Noted: 2019-10-01 | Resolved: 2023-03-13

## 2023-03-13 PROBLEM — Z87.59 HISTORY OF SHOULDER DYSTOCIA IN PRIOR PREGNANCY: Status: RESOLVED | Noted: 2019-10-01 | Resolved: 2023-03-13

## 2023-03-13 PROCEDURE — 99999 PR PBB SHADOW E&M-EST. PATIENT-LVL III: CPT | Mod: PBBFAC,,, | Performed by: OBSTETRICS & GYNECOLOGY

## 2023-03-13 PROCEDURE — 3008F BODY MASS INDEX DOCD: CPT | Mod: CPTII,S$GLB,, | Performed by: OBSTETRICS & GYNECOLOGY

## 2023-03-13 PROCEDURE — 3078F PR MOST RECENT DIASTOLIC BLOOD PRESSURE < 80 MM HG: ICD-10-PCS | Mod: CPTII,S$GLB,, | Performed by: OBSTETRICS & GYNECOLOGY

## 2023-03-13 PROCEDURE — 1159F MED LIST DOCD IN RCRD: CPT | Mod: CPTII,S$GLB,, | Performed by: OBSTETRICS & GYNECOLOGY

## 2023-03-13 PROCEDURE — 1159F PR MEDICATION LIST DOCUMENTED IN MEDICAL RECORD: ICD-10-PCS | Mod: CPTII,S$GLB,, | Performed by: OBSTETRICS & GYNECOLOGY

## 2023-03-13 PROCEDURE — 3074F SYST BP LT 130 MM HG: CPT | Mod: CPTII,S$GLB,, | Performed by: OBSTETRICS & GYNECOLOGY

## 2023-03-13 PROCEDURE — 99395 PREV VISIT EST AGE 18-39: CPT | Mod: S$GLB,,, | Performed by: OBSTETRICS & GYNECOLOGY

## 2023-03-13 PROCEDURE — 3074F PR MOST RECENT SYSTOLIC BLOOD PRESSURE < 130 MM HG: ICD-10-PCS | Mod: CPTII,S$GLB,, | Performed by: OBSTETRICS & GYNECOLOGY

## 2023-03-13 PROCEDURE — 1160F RVW MEDS BY RX/DR IN RCRD: CPT | Mod: CPTII,S$GLB,, | Performed by: OBSTETRICS & GYNECOLOGY

## 2023-03-13 PROCEDURE — 3008F PR BODY MASS INDEX (BMI) DOCUMENTED: ICD-10-PCS | Mod: CPTII,S$GLB,, | Performed by: OBSTETRICS & GYNECOLOGY

## 2023-03-13 PROCEDURE — 1160F PR REVIEW ALL MEDS BY PRESCRIBER/CLIN PHARMACIST DOCUMENTED: ICD-10-PCS | Mod: CPTII,S$GLB,, | Performed by: OBSTETRICS & GYNECOLOGY

## 2023-03-13 PROCEDURE — 99999 PR PBB SHADOW E&M-EST. PATIENT-LVL III: ICD-10-PCS | Mod: PBBFAC,,, | Performed by: OBSTETRICS & GYNECOLOGY

## 2023-03-13 PROCEDURE — 99395 PR PREVENTIVE VISIT,EST,18-39: ICD-10-PCS | Mod: S$GLB,,, | Performed by: OBSTETRICS & GYNECOLOGY

## 2023-03-13 PROCEDURE — 3078F DIAST BP <80 MM HG: CPT | Mod: CPTII,S$GLB,, | Performed by: OBSTETRICS & GYNECOLOGY

## 2023-03-13 RX ORDER — TRIAMCINOLONE ACETONIDE 1 MG/G
OINTMENT TOPICAL 2 TIMES DAILY
Qty: 30 G | Refills: 0 | Status: SHIPPED | OUTPATIENT
Start: 2023-03-13 | End: 2023-03-23

## 2023-03-13 NOTE — PROGRESS NOTES
Subjective:       Patient ID: Julienne Rosa is a 37 y.o. female.    Chief Complaint:  Annual Exam      History of Present Illness  HPI  Presents for well-woman exam.  Pt has regular, monthly periods.  MM with her , who had a vasectomy.  Pt uses pads during her period.  Notes intense vulvar itching the week after each period that resolves spontaneously.  No discharge or odor.   Pt was having issues with pelvic pressure that resolved s/p PFPT.  Still doing exercises at home.  Last pap: 2021: neg    GYN & OB History  Patient's last menstrual period was 2023 (approximate).   Date of Last Pap: No result found    OB History    Para Term  AB Living   3 3 3     3   SAB IAB Ectopic Multiple Live Births         0 3      # Outcome Date GA Lbr Will/2nd Weight Sex Delivery Anes PTL Lv   3 Term 21 38w1d  3.373 kg (7 lb 7 oz) F Vag-Spont None N PEYTON   2 Term 20 38w5d  3.47 kg (7 lb 10.4 oz) M Vag-Spont EPI N PEYTON   1 Term 11/24/15 39w3d / 01:59 3.29 kg (7 lb 4.1 oz) M Vag-Spont EPI N PEYTON      Complications: Shoulder Dystocia       Review of Systems  Review of Systems        Objective:    Physical Exam:   Constitutional: She is oriented to person, place, and time. She appears well-developed and well-nourished. No distress.      Neck: No thyromegaly present.     Pulmonary/Chest: Right breast exhibits no inverted nipple, no mass, no nipple discharge, no skin change, no tenderness, no bleeding and no swelling. Left breast exhibits inverted nipple (has always been inverted). Left breast exhibits no mass, no nipple discharge, no skin change, no tenderness, no bleeding and no swelling. Breasts are symmetrical.        Abdominal: Soft. She exhibits no distension and no mass. There is no abdominal tenderness. There is no rebound and no guarding.     Genitourinary:    Pelvic exam was performed with patient supine.   There is no rash, tenderness, lesion or injury on the right labia. There is no  rash, tenderness, lesion or injury on the left labia. Cervix is normal. Right adnexum displays no mass, no tenderness and no fullness. Left adnexum displays no mass, no tenderness and no fullness. No erythema,  no vaginal discharge, tenderness, bleeding, rectocele or cystocele in the vagina.    No foreign body in the vagina.      No signs of injury in the vagina.   Cervix exhibits no motion tenderness, no discharge and no friability. Uterus is not deviated, not enlarged, not fixed, not tender and not hosting fibroids.               Neurological: She is alert and oriented to person, place, and time.     Psychiatric: She has a normal mood and affect.        Assessment:        1. Well woman exam with routine gynecological exam    2. Pelvic pressure in female    3. Subacute vulvitis                Plan:      Julienne was seen today for annual exam.    Diagnoses and all orders for this visit:    Well woman exam with routine gynecological exam    Pelvic pressure in female    Subacute vulvitis  -     triamcinolone acetonide 0.1% (KENALOG) 0.1 % ointment; Apply topically 2 (two) times daily. for 5 days     Reviewed updated recommendations for pap smears (every 3 years) in low risk patients.   Recommend annual pelvic exams.  Reviewed recommendations for annual CBE.  Vulvar skin appears healthy.  Advised to use a barrier ointment (Aquaphor, John's) on the days she wears pads.  Can use kenalog ointment as needed for vulvar itching for up to 5 days.  RTC 1 year.

## 2023-03-28 ENCOUNTER — OFFICE VISIT (OUTPATIENT)
Dept: INTERNAL MEDICINE | Facility: CLINIC | Age: 38
End: 2023-03-28
Payer: COMMERCIAL

## 2023-03-28 DIAGNOSIS — J32.9 BACTERIAL SINUSITIS: Primary | ICD-10-CM

## 2023-03-28 DIAGNOSIS — B96.89 BACTERIAL SINUSITIS: Primary | ICD-10-CM

## 2023-03-28 PROCEDURE — 1159F MED LIST DOCD IN RCRD: CPT | Mod: CPTII,95,,

## 2023-03-28 PROCEDURE — 1159F PR MEDICATION LIST DOCUMENTED IN MEDICAL RECORD: ICD-10-PCS | Mod: CPTII,95,,

## 2023-03-28 PROCEDURE — 99213 PR OFFICE/OUTPT VISIT, EST, LEVL III, 20-29 MIN: ICD-10-PCS | Mod: 95,,,

## 2023-03-28 PROCEDURE — 1160F RVW MEDS BY RX/DR IN RCRD: CPT | Mod: CPTII,95,,

## 2023-03-28 PROCEDURE — 99213 OFFICE O/P EST LOW 20 MIN: CPT | Mod: 95,,,

## 2023-03-28 PROCEDURE — 1160F PR REVIEW ALL MEDS BY PRESCRIBER/CLIN PHARMACIST DOCUMENTED: ICD-10-PCS | Mod: CPTII,95,,

## 2023-03-28 RX ORDER — ALBUTEROL SULFATE 90 UG/1
2 AEROSOL, METERED RESPIRATORY (INHALATION) EVERY 6 HOURS PRN
Qty: 18 G | Refills: 0 | Status: SHIPPED | OUTPATIENT
Start: 2023-03-28 | End: 2024-03-27

## 2023-03-28 RX ORDER — DOXYCYCLINE 100 MG/1
100 CAPSULE ORAL EVERY 12 HOURS
Qty: 14 CAPSULE | Refills: 0 | Status: SHIPPED | OUTPATIENT
Start: 2023-03-28

## 2023-03-28 NOTE — PROGRESS NOTES
Julienne Rosa  03/28/2023  [id number]    MICHELLE Dillard MD  Patient Care Team:  MICHELLE Dillard MD as PCP - General (Family Medicine)          Visit Type:an urgent visit for a new problem  The patient location is: [facility]   The chief complaint leading to consultation is:   Sinus Congestion     Visit type: audiovisual    Face to Face time with patient: 11   minutes of total time spent on the encounter, which includes face to face time and non-face to face time preparing to see the patient (eg, review of tests), Obtaining and/or reviewing separately obtained history, Documenting clinical information in the electronic or other health record, Independently interpreting results (not separately reported) and communicating results to the patient/family/caregiver, or Care coordination (not separately reported).         Each patient to whom he or she provides medical services by telemedicine is:  (1) informed of the relationship between the physician and patient and the respective role of any other health care provider with respect to management of the patient; and (2) notified that he or she may decline to receive medical services by telemedicine and may withdraw from such care at any time.    Notes:      She has been having sinus congestion/cough/sinus pressure for the last 8 days  Negative for fever or SOB   Tried OTC Muncinex and cough medicine without relief and   Using xyzal and using flonase daily as well   She has been coughing since January   Cough has gotten worse since congestion and sinus pressure has started     History:  Past Medical History:   Diagnosis Date    Abnormal Pap smear of cervix     Abnormal Pap smear of vagina     x 2-had 2 normal colposcopies//paps have been normal since    Mental disorder     depression and anxiety    Seasonal allergic rhinitis due to pollen 2/2/2019     Past Surgical History:   Procedure Laterality Date    MOUTH SURGERY      tonsilectomy       Family History    Problem Relation Age of Onset    No Known Problems Paternal Grandfather     No Known Problems Paternal Grandmother     No Known Problems Maternal Grandmother     No Known Problems Maternal Grandfather     No Known Problems Father     No Known Problems Mother     No Known Problems Brother     No Known Problems Sister     Breast cancer Neg Hx     Colon cancer Neg Hx     Ovarian cancer Neg Hx      Social History     Socioeconomic History    Marital status:    Tobacco Use    Smoking status: Never    Smokeless tobacco: Never   Substance and Sexual Activity    Alcohol use: Not Currently     Comment: socially    Drug use: No    Sexual activity: Yes     Partners: Male     Birth control/protection: Partner-Vasectomy     Patient Active Problem List   Diagnosis    Pelvic pressure in female     Review of patient's allergies indicates:   Allergen Reactions    Ceclor [cefaclor] Other (See Comments)     unknown    Augmentin [amoxicillin-pot clavulanate] Hives    Sulfa (sulfonamide antibiotics) Rash       The following were reviewed at this visit: active problem list, medication list, allergies, family history, social history, and health maintenance.    Medications:  Current Outpatient Medications on File Prior to Visit   Medication Sig Dispense Refill    azithromycin (Z-SELVIN) 250 MG tablet Take as directed 6 tablet 0    diphenhydrAMINE-aluminum-magnesium hydroxide-simethicone-LIDOcaine HCl 2% Swish and spit 15 mLs every 6 (six) hours as needed (throat pain). 100 each 0    fluticasone propionate (FLONASE) 50 mcg/actuation nasal spray Shake well and 1 spray (50 mcg total) by Each Nostril route once daily. 16 g 0    Lactobacillus rhamnosus GG (CULTURELLE) 10 billion cell capsule Take 1 capsule by mouth once daily.      levocetirizine (XYZAL) 5 MG tablet Take 1 tablet (5 mg total) by mouth every evening. 30 tablet 0    LIDOcaine HCl 2%-aluminum & magnesium hydroxide-simethicone-diphenhydrAMINE Swish and spit 15ml by mouth  every 6 hours as needed for throat pain 420 mL 0    multivitamin capsule Take 1 capsule by mouth once daily.      prenatal vits62/FA/om3/dha/epa (PRENATAL GUMMY ORAL)       triamcinolone acetonide 0.1% (KENALOG) 0.1 % ointment Apply topically 2 (two) times daily. for 5 days 30 g 0     No current facility-administered medications on file prior to visit.       Medications have been reviewed and reconciled with patient at this visit.  Barriers to medications reviewed with patient.    Adverse reactions to current medications reviewed with patient..    Over the counter medications reviewed and reconciled with patient.    Exam:  Wt Readings from Last 3 Encounters:   03/13/23 65 kg (143 lb 4.8 oz)   01/31/23 63.1 kg (139 lb 1.8 oz)   06/06/22 64.6 kg (142 lb 6.7 oz)     Temp Readings from Last 3 Encounters:   01/31/23 97.6 °F (36.4 °C)   12/08/21 98.2 °F (36.8 °C) (Tympanic)   08/01/21 98.5 °F (36.9 °C) (Axillary)     BP Readings from Last 3 Encounters:   03/13/23 115/70   01/31/23 116/82   06/06/22 134/72     Pulse Readings from Last 3 Encounters:   01/31/23 80   12/08/21 77   08/01/21 85     There is no height or weight on file to calculate BMI.      Review of Systems   Constitutional:  Negative for chills, fever and weight loss.   HENT:  Positive for congestion and sinus pain. Negative for ear pain and sore throat.    Respiratory:  Positive for cough. Negative for hemoptysis, shortness of breath and wheezing.    Cardiovascular:  Negative for chest pain.   Gastrointestinal:  Negative for heartburn.   Musculoskeletal:  Negative for myalgias.   Skin:  Negative for rash.   Neurological:  Positive for headaches.   Endo/Heme/Allergies:  Positive for environmental allergies.   Answers submitted by the patient for this visit:  Cough Questionnaire (Submitted on 3/28/2023)  Chief Complaint: Cough  Chronicity: new  Onset: in the past 7 days  Progression since onset: waxing and waning  Frequency: every few minutes  Cough  characteristics: productive of purulent sputum  ear congestion: Yes  nasal congestion: Yes  postnasal drip: Yes  rhinorrhea: Yes  sweats: No  Aggravated by: nothing  asthma: No  bronchiectasis: No  bronchitis: No  COPD: No  emphysema: No  pneumonia: No  Treatments tried: OTC cough suppressant, prescription cough suppressant  Improvement on treatment: mild      Physical Exam  Nursing note reviewed.   Pulmonary:      Effort: Pulmonary effort is normal. No respiratory distress.   Neurological:      Mental Status: She is alert and oriented to person, place, and time.   Psychiatric:         Mood and Affect: Mood normal.         Behavior: Behavior normal.         Thought Content: Thought content normal.         Judgment: Judgment normal.       Laboratory Reviewed ({Yes)  Lab Results   Component Value Date    WBC 9.32 07/31/2021    HGB 11.7 (L) 07/31/2021    HCT 35.8 (L) 07/31/2021     07/31/2021    CHOL 163 01/31/2017    TRIG 97 01/31/2017    HDL 46 01/31/2017    ALT 21 05/07/2020    AST 16 05/07/2020     05/07/2020    K 3.8 05/07/2020     05/07/2020    CREATININE 0.7 05/07/2020    BUN 15 05/07/2020    CO2 22 (L) 05/07/2020    TSH 1.554 01/31/2017    GLUF 86 04/01/2015       Diagnoses and all orders for this visit:    Bacterial sinusitis  -     doxycycline (VIBRAMYCIN) 100 MG Cap; Take 1 capsule (100 mg total) by mouth every 12 (twelve) hours.  -     albuterol (PROAIR HFA) 90 mcg/actuation inhaler; Inhale 2 puffs into the lungs every 6 (six) hours as needed for Wheezing. Rescue      Instructions  Drink plenty of fluids  Get plenty of Rest  Take OTC tylenol and or ibuprofen as directed on package for pain/fever. Do not take more than package suggests to take  Follow up with your PCP in 1 week if symptoms persist     If no improvement in symptoms recommend in office appt with our ENT department     Care Plan/Goals: Reviewed    Goals    None         Follow up: No follow-ups on file.    After visit summary  was printed and given to patient upon discharge today.  Patient goals and care plan are included in After Visit Summary.

## 2023-07-20 ENCOUNTER — PATIENT MESSAGE (OUTPATIENT)
Dept: ADMINISTRATIVE | Facility: OTHER | Age: 38
End: 2023-07-20
Payer: COMMERCIAL

## 2023-11-29 NOTE — DISCHARGE SUMMARY
"[facility] -   Obstetrics  Discharge Summary      Patient Name: Julienne Rosa  MRN: [id number]  Admission Date: 2020  Hospital Length of Stay: 2 days  Discharge Date and Time:  2020 10:44 AM  Attending Physician: Marina Gonzales MD   Discharging Provider: Theodora Mcguire CNM   Primary Care Provider: MICHELLE Dillard MD    HPI:  38.4 to L&D with c/o SROM of clear fluid @ 0300        * No surgery found *     Hospital Course:   Admit to L&D for management of labor  SVE 3-/-2  VSSAF  Strongly desires to avoid Pitocin at this time, encouraged to walk and pump  Would like NCB  1700: cervix unchanged, will start Pitocin  0515: epidural in place, 9 cm, OP position  Patient's labor progressed well.  On 20, she had an uncomplicated  of a male infant (baby "Chet").   cc.  Periurethral laceration noted and repaired with 3-0 chromic running.  She and the baby were transferred to [facility] for routine postpartum care.  20- PPD 1.5- doing well, routine postpartum orders   20-PPD.5- pt requesting discharge home. Infant discharged. Discharge instructions given.         Final Active Diagnoses:    Diagnosis Date Noted POA    PRINCIPAL PROBLEM:   (normal spontaneous vaginal delivery) [O80] 2020 Not Applicable    Single live  [Z38.2] 2020 Unknown      Problems Resolved During this Admission:    Diagnosis Date Noted Date Resolved POA    Prolonged rupture of membranes [O42.90] 2020 No    Amniotic fluid leaking [O42.90] 2020 Yes        Labs: All labs within the past 24 hours have been reviewed    Feeding Method: breast    Immunizations     None          Delivery:    Episiotomy: None   Lacerations: 1st   Repair suture:     Repair # of packets:     Blood loss (ml): 300     Birth information:  YOB: 2020   Time of birth: 7:32 AM   Sex: male   Delivery type: Vaginal, Spontaneous   Gestational Age: " Pt reports chest pain has slightly improved since arriving in ER, rates pain 6/10 at this time, reports he has not taken anything for pain today. Pt ambulates independently with steady gait to ER room 11. Pt denies SOB.    38w5d    Delivery Clinician:      Other providers:       Additional  information:  Forceps:    Vacuum:    Breech:    Observed anomalies      Living?:           APGARS  One minute Five minutes Ten minutes   Skin color:         Heart rate:         Grimace:         Muscle tone:         Breathing:         Totals: 8  9        Placenta: Delivered:       appearance    Pending Diagnostic Studies:     None          Discharged Condition: good    Disposition: Home or Self Care    Follow Up:  Follow-up Information     Margot Bond MD In 4 weeks.    Specialties:  Obstetrics and Gynecology, Obstetrics  Contact information:  [address] [place] [address] [address] Whit [place] [address] [phone]                 Patient Instructions:      Diet Adult Regular     Pelvic Rest     Notify your health care provider if you experience any of the following:  temperature >100.4     Notify your health care provider if you experience any of the following:  persistent nausea and vomiting or diarrhea     Notify your health care provider if you experience any of the following:  severe uncontrolled pain     Notify your health care provider if you experience any of the following:  difficulty breathing or increased cough     Notify your health care provider if you experience any of the following:  severe persistent headache     Notify your health care provider if you experience any of the following:  persistent dizziness, light-headedness, or visual disturbances     Notify your health care provider if you experience any of the following:  increased confusion or weakness     Notify your health care provider if you experience any of the following:   Order Comments: Increased vaginal bleeding, dizziness, headache, blurred vision, and/or signs of postpartum depression     Medications:  Current Discharge Medication List      START taking these medications    Details   HYDROcodone-acetaminophen (NORCO) 5-325 mg per tablet Take 1 tablet by mouth every 4 (four)  hours as needed.  Qty: 10 tablet, Refills: 0    Comments: Quantity prescribed more than 7 day supply? No      ibuprofen (ADVIL,MOTRIN) 600 MG tablet Take 1 tablet (600 mg total) by mouth every 6 (six) hours.  Qty: 30 tablet, Refills: 0         CONTINUE these medications which have NOT CHANGED    Details   prenatal vits62/FA/om3/dha/epa (PRENATAL GUMMY ORAL)              Theodora Mcguire CNM  Obstetrics  [facility]

## 2024-01-29 ENCOUNTER — LAB VISIT (OUTPATIENT)
Dept: LAB | Facility: HOSPITAL | Age: 39
End: 2024-01-29
Attending: FAMILY MEDICINE
Payer: COMMERCIAL

## 2024-01-29 DIAGNOSIS — E72.11 METHYLENE THF REDUCTASE DEFICIENCY AND HOMOCYSTINURIA: ICD-10-CM

## 2024-01-29 DIAGNOSIS — Z79.899 ENCOUNTER FOR LONG-TERM (CURRENT) USE OF OTHER MEDICATIONS: ICD-10-CM

## 2024-01-29 DIAGNOSIS — E72.12 METHYLENE THF REDUCTASE DEFICIENCY AND HOMOCYSTINURIA: ICD-10-CM

## 2024-01-29 DIAGNOSIS — F41.9 ANXIETY: ICD-10-CM

## 2024-01-29 DIAGNOSIS — E34.9 MULTIPLE ENDOCRINE DEFICIENCY SYNDROME: ICD-10-CM

## 2024-01-29 DIAGNOSIS — G47.00 INSOMNIA, UNSPECIFIED: ICD-10-CM

## 2024-01-29 DIAGNOSIS — Z13.220 SCREENING CHOLESTEROL LEVEL: ICD-10-CM

## 2024-01-29 DIAGNOSIS — Z00.00 ROUTINE GENERAL MEDICAL EXAMINATION AT A HEALTH CARE FACILITY: ICD-10-CM

## 2024-01-29 DIAGNOSIS — Z00.00 ROUTINE GENERAL MEDICAL EXAMINATION AT A HEALTH CARE FACILITY: Primary | ICD-10-CM

## 2024-01-29 DIAGNOSIS — D64.9 ANEMIA, UNSPECIFIED: ICD-10-CM

## 2024-01-29 DIAGNOSIS — R53.83 FATIGUE: ICD-10-CM

## 2024-01-29 LAB
ALBUMIN SERPL BCP-MCNC: 3.9 G/DL (ref 3.5–5.2)
ALP SERPL-CCNC: 67 U/L (ref 55–135)
ALT SERPL W/O P-5'-P-CCNC: 12 U/L (ref 10–44)
ANION GAP SERPL CALC-SCNC: 8 MMOL/L (ref 8–16)
AST SERPL-CCNC: 14 U/L (ref 10–40)
BASOPHILS # BLD AUTO: 0.05 K/UL (ref 0–0.2)
BASOPHILS NFR BLD: 0.9 % (ref 0–1.9)
BILIRUB SERPL-MCNC: 0.5 MG/DL (ref 0.1–1)
BILIRUB UR QL STRIP: NEGATIVE
BUN SERPL-MCNC: 11 MG/DL (ref 6–20)
CALCIUM SERPL-MCNC: 9.2 MG/DL (ref 8.7–10.5)
CHLORIDE SERPL-SCNC: 106 MMOL/L (ref 95–110)
CHOLEST SERPL-MCNC: 183 MG/DL (ref 120–199)
CHOLEST/HDLC SERPL: 3.9 {RATIO} (ref 2–5)
CLARITY UR: CLEAR
CO2 SERPL-SCNC: 23 MMOL/L (ref 23–29)
COLOR UR: YELLOW
CORTIS SERPL-MCNC: 8.1 UG/DL (ref 4.3–22.4)
CREAT SERPL-MCNC: 0.7 MG/DL (ref 0.5–1.4)
DHEA-S SERPL-MCNC: 30.5 UG/DL (ref 74.8–410.2)
DIFFERENTIAL METHOD BLD: NORMAL
EOSINOPHIL # BLD AUTO: 0.1 K/UL (ref 0–0.5)
EOSINOPHIL NFR BLD: 2.4 % (ref 0–8)
ERYTHROCYTE [DISTWIDTH] IN BLOOD BY AUTOMATED COUNT: 13.2 % (ref 11.5–14.5)
EST. GFR  (NO RACE VARIABLE): >60 ML/MIN/1.73 M^2
ESTRADIOL SERPL-MCNC: 37 PG/ML
FERRITIN SERPL-MCNC: 13 NG/ML (ref 20–300)
FOLATE SERPL-MCNC: 16.1 NG/ML (ref 4–24)
FSH SERPL-ACNC: 8 MIU/ML
GLUCOSE SERPL-MCNC: 90 MG/DL (ref 70–110)
GLUCOSE UR QL STRIP: NEGATIVE
HCT VFR BLD AUTO: 42.3 % (ref 37–48.5)
HCYS SERPL-SCNC: 3.6 UMOL/L (ref 4–15.5)
HDLC SERPL-MCNC: 47 MG/DL (ref 40–75)
HDLC SERPL: 25.7 % (ref 20–50)
HGB BLD-MCNC: 13.6 G/DL (ref 12–16)
HGB UR QL STRIP: NEGATIVE
IMM GRANULOCYTES # BLD AUTO: 0.01 K/UL (ref 0–0.04)
IMM GRANULOCYTES NFR BLD AUTO: 0.2 % (ref 0–0.5)
IRON SERPL-MCNC: 78 UG/DL (ref 30–160)
KETONES UR QL STRIP: NEGATIVE
LDLC SERPL CALC-MCNC: 111 MG/DL (ref 63–159)
LEUKOCYTE ESTERASE UR QL STRIP: NEGATIVE
LH SERPL-ACNC: 3.9 MIU/ML
LYMPHOCYTES # BLD AUTO: 1.7 K/UL (ref 1–4.8)
LYMPHOCYTES NFR BLD: 29.1 % (ref 18–48)
MCH RBC QN AUTO: 29.1 PG (ref 27–31)
MCHC RBC AUTO-ENTMCNC: 32.2 G/DL (ref 32–36)
MCV RBC AUTO: 90 FL (ref 82–98)
MONOCYTES # BLD AUTO: 0.4 K/UL (ref 0.3–1)
MONOCYTES NFR BLD: 7.2 % (ref 4–15)
NEUTROPHILS # BLD AUTO: 3.5 K/UL (ref 1.8–7.7)
NEUTROPHILS NFR BLD: 60.2 % (ref 38–73)
NITRITE UR QL STRIP: NEGATIVE
NONHDLC SERPL-MCNC: 136 MG/DL
NRBC BLD-RTO: 0 /100 WBC
PH UR STRIP: 6 [PH] (ref 5–8)
PLATELET # BLD AUTO: 253 K/UL (ref 150–450)
PMV BLD AUTO: 12.1 FL (ref 9.2–12.9)
POTASSIUM SERPL-SCNC: 4.5 MMOL/L (ref 3.5–5.1)
PROGEST SERPL-MCNC: 0.6 NG/ML
PROT SERPL-MCNC: 6.9 G/DL (ref 6–8.4)
PROT UR QL STRIP: NEGATIVE
RBC # BLD AUTO: 4.68 M/UL (ref 4–5.4)
SATURATED IRON: 18 % (ref 20–50)
SODIUM SERPL-SCNC: 137 MMOL/L (ref 136–145)
SP GR UR STRIP: 1.02 (ref 1–1.03)
T3FREE SERPL-MCNC: 3.4 PG/ML (ref 2.3–4.2)
T4 FREE SERPL-MCNC: 0.9 NG/DL (ref 0.71–1.51)
TESTOST SERPL-MCNC: 17 NG/DL (ref 5–73)
THYROGLOB AB SERPL IA-ACNC: <4 IU/ML (ref 0–3.9)
THYROPEROXIDASE IGG SERPL-ACNC: <6 IU/ML
TOTAL IRON BINDING CAPACITY: 441 UG/DL (ref 250–450)
TRANSFERRIN SERPL-MCNC: 298 MG/DL (ref 200–375)
TRIGL SERPL-MCNC: 125 MG/DL (ref 30–150)
TSH SERPL DL<=0.005 MIU/L-ACNC: 1.49 UIU/ML (ref 0.4–4)
URN SPEC COLLECT METH UR: NORMAL
VIT B12 SERPL-MCNC: >2000 PG/ML (ref 210–950)
WBC # BLD AUTO: 5.73 K/UL (ref 3.9–12.7)

## 2024-01-29 PROCEDURE — 82533 TOTAL CORTISOL: CPT | Performed by: FAMILY MEDICINE

## 2024-01-29 PROCEDURE — 84144 ASSAY OF PROGESTERONE: CPT | Performed by: FAMILY MEDICINE

## 2024-01-29 PROCEDURE — 82746 ASSAY OF FOLIC ACID SERUM: CPT | Performed by: FAMILY MEDICINE

## 2024-01-29 PROCEDURE — 82607 VITAMIN B-12: CPT | Performed by: FAMILY MEDICINE

## 2024-01-29 PROCEDURE — 82627 DEHYDROEPIANDROSTERONE: CPT | Performed by: FAMILY MEDICINE

## 2024-01-29 PROCEDURE — 82670 ASSAY OF TOTAL ESTRADIOL: CPT | Performed by: FAMILY MEDICINE

## 2024-01-29 PROCEDURE — 84443 ASSAY THYROID STIM HORMONE: CPT | Performed by: FAMILY MEDICINE

## 2024-01-29 PROCEDURE — 81003 URINALYSIS AUTO W/O SCOPE: CPT | Performed by: FAMILY MEDICINE

## 2024-01-29 PROCEDURE — 86376 MICROSOMAL ANTIBODY EACH: CPT | Performed by: FAMILY MEDICINE

## 2024-01-29 PROCEDURE — 83002 ASSAY OF GONADOTROPIN (LH): CPT | Performed by: FAMILY MEDICINE

## 2024-01-29 PROCEDURE — 36415 COLL VENOUS BLD VENIPUNCTURE: CPT | Performed by: FAMILY MEDICINE

## 2024-01-29 PROCEDURE — 80061 LIPID PANEL: CPT | Performed by: FAMILY MEDICINE

## 2024-01-29 PROCEDURE — 80053 COMPREHEN METABOLIC PANEL: CPT | Performed by: FAMILY MEDICINE

## 2024-01-29 PROCEDURE — 86800 THYROGLOBULIN ANTIBODY: CPT | Performed by: FAMILY MEDICINE

## 2024-01-29 PROCEDURE — 83001 ASSAY OF GONADOTROPIN (FSH): CPT | Performed by: FAMILY MEDICINE

## 2024-01-29 PROCEDURE — 82728 ASSAY OF FERRITIN: CPT | Performed by: FAMILY MEDICINE

## 2024-01-29 PROCEDURE — 83540 ASSAY OF IRON: CPT | Performed by: FAMILY MEDICINE

## 2024-01-29 PROCEDURE — 83090 ASSAY OF HOMOCYSTEINE: CPT | Performed by: FAMILY MEDICINE

## 2024-01-29 PROCEDURE — 85025 COMPLETE CBC W/AUTO DIFF WBC: CPT | Performed by: FAMILY MEDICINE

## 2024-01-29 PROCEDURE — 82679 ASSAY OF ESTRONE: CPT | Performed by: FAMILY MEDICINE

## 2024-01-29 PROCEDURE — 84403 ASSAY OF TOTAL TESTOSTERONE: CPT | Performed by: FAMILY MEDICINE

## 2024-01-29 PROCEDURE — 84439 ASSAY OF FREE THYROXINE: CPT | Performed by: FAMILY MEDICINE

## 2024-01-29 PROCEDURE — 84481 FREE ASSAY (FT-3): CPT | Performed by: FAMILY MEDICINE

## 2024-02-02 LAB — ESTRONE SERPL-MCNC: 50 PG/ML

## 2024-03-11 ENCOUNTER — OFFICE VISIT (OUTPATIENT)
Dept: FAMILY MEDICINE | Facility: CLINIC | Age: 39
End: 2024-03-11
Payer: COMMERCIAL

## 2024-03-11 VITALS
RESPIRATION RATE: 18 BRPM | HEART RATE: 84 BPM | SYSTOLIC BLOOD PRESSURE: 118 MMHG | TEMPERATURE: 99 F | DIASTOLIC BLOOD PRESSURE: 82 MMHG | HEIGHT: 61 IN | OXYGEN SATURATION: 96 % | WEIGHT: 140.56 LBS | BODY MASS INDEX: 26.54 KG/M2

## 2024-03-11 DIAGNOSIS — R68.89 FLU-LIKE SYMPTOMS: Primary | ICD-10-CM

## 2024-03-11 LAB
CTP QC/QA: YES
CTP QC/QA: YES
FLUAV AG NPH QL: NEGATIVE
FLUBV AG NPH QL: NEGATIVE
SARS-COV-2 RDRP RESP QL NAA+PROBE: NEGATIVE

## 2024-03-11 PROCEDURE — 3074F SYST BP LT 130 MM HG: CPT | Mod: CPTII,S$GLB,, | Performed by: INTERNAL MEDICINE

## 2024-03-11 PROCEDURE — 87804 INFLUENZA ASSAY W/OPTIC: CPT | Mod: 59,QW,S$GLB, | Performed by: INTERNAL MEDICINE

## 2024-03-11 PROCEDURE — 87635 SARS-COV-2 COVID-19 AMP PRB: CPT | Mod: QW,S$GLB,, | Performed by: INTERNAL MEDICINE

## 2024-03-11 PROCEDURE — 3079F DIAST BP 80-89 MM HG: CPT | Mod: CPTII,S$GLB,, | Performed by: INTERNAL MEDICINE

## 2024-03-11 PROCEDURE — 3008F BODY MASS INDEX DOCD: CPT | Mod: CPTII,S$GLB,, | Performed by: INTERNAL MEDICINE

## 2024-03-11 PROCEDURE — 99999 PR PBB SHADOW E&M-EST. PATIENT-LVL IV: CPT | Mod: PBBFAC,,, | Performed by: INTERNAL MEDICINE

## 2024-03-11 PROCEDURE — 99214 OFFICE O/P EST MOD 30 MIN: CPT | Mod: S$GLB,,, | Performed by: INTERNAL MEDICINE

## 2024-03-11 PROCEDURE — 1159F MED LIST DOCD IN RCRD: CPT | Mod: CPTII,S$GLB,, | Performed by: INTERNAL MEDICINE

## 2024-03-11 RX ORDER — PROMETHAZINE HYDROCHLORIDE AND DEXTROMETHORPHAN HYDROBROMIDE 6.25; 15 MG/5ML; MG/5ML
5 SYRUP ORAL EVERY 4 HOURS PRN
Qty: 118 ML | Refills: 0 | Status: SHIPPED | OUTPATIENT
Start: 2024-03-11 | End: 2024-03-21

## 2024-03-11 NOTE — PROGRESS NOTES
Subjective     Patient ID: Julienne Rosa is a 38 y.o. female.    Chief Complaint: Cough (Since 2/14), Nasal Congestion, Hoarse, and Generalized Body Aches (No fever)      HPI  Patient presents due to flu-like symptoms. She reports she first became sick on 2/14. She started to feel better when she developed a cough. Last night she started  having body aches, cough, runny nose, nasal congestion and postnasal drip. She has been taking Elderberry, Vitamin C and D and cough medicine.    Review of Systems   Constitutional:  Negative for chills and fatigue.        Body aches   HENT:  Positive for nasal congestion, postnasal drip, rhinorrhea and voice change.    Respiratory:  Positive for cough. Negative for chest tightness and shortness of breath.    Cardiovascular:  Negative for chest pain and palpitations.   Gastrointestinal:  Negative for abdominal pain, constipation, diarrhea, nausea and vomiting.   Genitourinary:  Negative for frequency and urgency.   Neurological:  Negative for dizziness, numbness and headaches.          Objective       Current Outpatient Medications:     Lactobacillus rhamnosus GG (CULTURELLE) 10 billion cell capsule, Take 1 capsule by mouth once daily., Disp: , Rfl:     multivitamin capsule, Take 1 capsule by mouth once daily., Disp: , Rfl:     albuterol (PROAIR HFA) 90 mcg/actuation inhaler, Inhale 2 puffs into the lungs every 6 (six) hours as needed for Wheezing. Rescue, Disp: 18 g, Rfl: 0    doxycycline (VIBRAMYCIN) 100 MG Cap, Take 1 capsule (100 mg total) by mouth every 12 (twelve) hours., Disp: 14 capsule, Rfl: 0    fluticasone propionate (FLONASE) 50 mcg/actuation nasal spray, Shake well and 1 spray (50 mcg total) by Each Nostril route once daily., Disp: 16 g, Rfl: 0    levocetirizine (XYZAL) 5 MG tablet, Take 1 tablet (5 mg total) by mouth every evening., Disp: 30 tablet, Rfl: 0    promethazine-dextromethorphan (PROMETHAZINE-DM) 6.25-15 mg/5 mL Syrp, Take 5 mLs by mouth every 4  (four) hours as needed (cough)., Disp: 118 mL, Rfl: 0    triamcinolone acetonide 0.1% (KENALOG) 0.1 % ointment, Apply topically 2 (two) times daily. for 5 days, Disp: 30 g, Rfl: 0     Physical Exam  Constitutional:       General: She is not in acute distress.     Appearance: Normal appearance. She is normal weight.   HENT:      Head: Normocephalic and atraumatic.   Cardiovascular:      Rate and Rhythm: Normal rate and regular rhythm.      Heart sounds: Normal heart sounds. No murmur heard.     No friction rub. No gallop.   Pulmonary:      Effort: Pulmonary effort is normal.      Breath sounds: Normal breath sounds. No wheezing, rhonchi or rales.   Abdominal:      General: Bowel sounds are normal. There is no distension.      Palpations: Abdomen is soft.      Tenderness: There is no abdominal tenderness. There is no rebound.   Skin:     General: Skin is warm and dry.      Coloration: Skin is not jaundiced.   Neurological:      General: No focal deficit present.      Mental Status: She is alert and oriented to person, place, and time. Mental status is at baseline.   Psychiatric:         Mood and Affect: Mood normal.         Behavior: Behavior normal.            Assessment and Plan     1. Flu-like symptoms  -     POCT Influenza A/B Rapid Antigen  -     POCT COVID-19 Rapid Screening  -     promethazine-dextromethorphan (PROMETHAZINE-DM) 6.25-15 mg/5 mL Syrp; Take 5 mLs by mouth every 4 (four) hours as needed (cough).  Dispense: 118 mL; Refill: 0    Will test for COVID and Flu. Will treat accordingly.  Advised patient to take Xyzal and to use Flonase.  Will prescribe Promethazine-DM for cough  COVID and Flu negative         No follow-ups on file.

## 2024-05-17 ENCOUNTER — LAB VISIT (OUTPATIENT)
Dept: LAB | Facility: HOSPITAL | Age: 39
End: 2024-05-17
Attending: FAMILY MEDICINE
Payer: COMMERCIAL

## 2024-05-17 DIAGNOSIS — E72.12 METHYLENE THF REDUCTASE DEFICIENCY AND HOMOCYSTINURIA: ICD-10-CM

## 2024-05-17 DIAGNOSIS — D51.9 VITAMIN B12 DEFICIENCY ANEMIA: ICD-10-CM

## 2024-05-17 DIAGNOSIS — E72.11 METHYLENE THF REDUCTASE DEFICIENCY AND HOMOCYSTINURIA: ICD-10-CM

## 2024-05-17 DIAGNOSIS — E72.12 METHYLENE THF REDUCTASE DEFICIENCY AND HOMOCYSTINURIA: Primary | ICD-10-CM

## 2024-05-17 DIAGNOSIS — D50.9 IRON DEFICIENCY ANEMIA, UNSPECIFIED: ICD-10-CM

## 2024-05-17 DIAGNOSIS — E27.40 ALDOSTERONE DEFICIENCY DUE TO 18-HYDROXYLASE DEFECT: ICD-10-CM

## 2024-05-17 DIAGNOSIS — E72.11 METHYLENE THF REDUCTASE DEFICIENCY AND HOMOCYSTINURIA: Primary | ICD-10-CM

## 2024-05-17 LAB
CORTIS SERPL-MCNC: 11 UG/DL
FERRITIN SERPL-MCNC: 28 NG/ML (ref 20–300)
IRON SERPL-MCNC: 143 UG/DL (ref 30–160)
SATURATED IRON: 35 % (ref 20–50)
TOTAL IRON BINDING CAPACITY: 407 UG/DL (ref 250–450)
TRANSFERRIN SERPL-MCNC: 275 MG/DL (ref 200–375)
VIT B12 SERPL-MCNC: 853 PG/ML (ref 210–950)

## 2024-05-17 PROCEDURE — 82627 DEHYDROEPIANDROSTERONE: CPT | Performed by: FAMILY MEDICINE

## 2024-05-17 PROCEDURE — 83540 ASSAY OF IRON: CPT | Performed by: FAMILY MEDICINE

## 2024-05-17 PROCEDURE — 82607 VITAMIN B-12: CPT | Performed by: FAMILY MEDICINE

## 2024-05-17 PROCEDURE — 36415 COLL VENOUS BLD VENIPUNCTURE: CPT | Performed by: FAMILY MEDICINE

## 2024-05-17 PROCEDURE — 82533 TOTAL CORTISOL: CPT | Performed by: FAMILY MEDICINE

## 2024-05-17 PROCEDURE — 82728 ASSAY OF FERRITIN: CPT | Performed by: FAMILY MEDICINE

## 2024-05-18 LAB — DHEA-S SERPL-MCNC: 80.1 UG/DL (ref 74.8–410.2)

## 2025-03-28 ENCOUNTER — LAB VISIT (OUTPATIENT)
Dept: LAB | Facility: HOSPITAL | Age: 40
End: 2025-03-28
Attending: INTERNAL MEDICINE
Payer: COMMERCIAL

## 2025-03-28 DIAGNOSIS — R53.83 FATIGUE, UNSPECIFIED TYPE: ICD-10-CM

## 2025-03-28 DIAGNOSIS — D51.9 VITAMIN B12 DEFICIENCY ANEMIA: ICD-10-CM

## 2025-03-28 DIAGNOSIS — E34.9 ENDOCRINE DISORDER RELATED TO PUBERTY: ICD-10-CM

## 2025-03-28 DIAGNOSIS — G47.00 PERSISTENT DISORDER OF INITIATING OR MAINTAINING SLEEP: ICD-10-CM

## 2025-03-28 DIAGNOSIS — D50.9 IRON DEFICIENCY ANEMIA, UNSPECIFIED: ICD-10-CM

## 2025-03-28 DIAGNOSIS — Z79.69 NEED FOR PROPHYLACTIC CHEMOTHERAPY: ICD-10-CM

## 2025-03-28 DIAGNOSIS — N92.6 IRREGULAR MENSTRUAL CYCLE: ICD-10-CM

## 2025-03-28 DIAGNOSIS — Z13.220 SCREENING FOR LIPOID DISORDERS: ICD-10-CM

## 2025-03-28 DIAGNOSIS — Z00.00 ROUTINE GENERAL MEDICAL EXAMINATION AT A HEALTH CARE FACILITY: Primary | ICD-10-CM

## 2025-03-28 PROCEDURE — 82607 VITAMIN B-12: CPT

## 2025-03-28 PROCEDURE — 82670 ASSAY OF TOTAL ESTRADIOL: CPT

## 2025-03-28 PROCEDURE — 83001 ASSAY OF GONADOTROPIN (FSH): CPT

## 2025-03-28 PROCEDURE — 85027 COMPLETE CBC AUTOMATED: CPT

## 2025-03-28 PROCEDURE — 84481 FREE ASSAY (FT-3): CPT

## 2025-03-28 PROCEDURE — 84144 ASSAY OF PROGESTERONE: CPT

## 2025-03-28 PROCEDURE — 82679 ASSAY OF ESTRONE: CPT

## 2025-03-28 PROCEDURE — 80053 COMPREHEN METABOLIC PANEL: CPT

## 2025-03-28 PROCEDURE — 80061 LIPID PANEL: CPT

## 2025-03-28 PROCEDURE — 82533 TOTAL CORTISOL: CPT

## 2025-03-28 PROCEDURE — 82306 VITAMIN D 25 HYDROXY: CPT

## 2025-03-28 PROCEDURE — 82728 ASSAY OF FERRITIN: CPT

## 2025-03-28 PROCEDURE — 36415 COLL VENOUS BLD VENIPUNCTURE: CPT

## 2025-03-28 PROCEDURE — 84403 ASSAY OF TOTAL TESTOSTERONE: CPT

## 2025-03-28 PROCEDURE — 81001 URINALYSIS AUTO W/SCOPE: CPT

## 2025-03-28 PROCEDURE — 84443 ASSAY THYROID STIM HORMONE: CPT

## 2025-03-28 PROCEDURE — 84439 ASSAY OF FREE THYROXINE: CPT

## 2025-03-29 LAB
25(OH)D3+25(OH)D2 SERPL-MCNC: 49 NG/ML (ref 30–96)
ALBUMIN SERPL BCP-MCNC: 4.2 G/DL (ref 3.5–5.2)
ALP SERPL-CCNC: 59 UNIT/L (ref 40–150)
ALT SERPL W/O P-5'-P-CCNC: 27 UNIT/L (ref 10–44)
ANION GAP (OHS): 10 MMOL/L (ref 8–16)
AST SERPL-CCNC: 21 UNIT/L (ref 11–45)
BILIRUB SERPL-MCNC: 0.4 MG/DL (ref 0.1–1)
BILIRUB UR QL STRIP.AUTO: NEGATIVE
BUN SERPL-MCNC: 12 MG/DL (ref 6–20)
CALCIUM SERPL-MCNC: 9 MG/DL (ref 8.7–10.5)
CHLORIDE SERPL-SCNC: 104 MMOL/L (ref 95–110)
CHOLEST SERPL-MCNC: 213 MG/DL (ref 120–199)
CHOLEST/HDLC SERPL: 4.2 {RATIO} (ref 2–5)
CLARITY UR: CLEAR
CO2 SERPL-SCNC: 22 MMOL/L (ref 23–29)
COLOR UR AUTO: COLORLESS
CORTIS SERPL-MCNC: 12.5 UG/DL
CREAT SERPL-MCNC: 0.7 MG/DL (ref 0.5–1.4)
ERYTHROCYTE [DISTWIDTH] IN BLOOD BY AUTOMATED COUNT: 12.8 % (ref 11.5–14.5)
ESTRADIOL SERPL HS-MCNC: 114 PG/ML
FERRITIN SERPL-MCNC: 28 NG/ML (ref 20–300)
FSH SERPL-ACNC: 4.87 MIU/ML
GFR SERPLBLD CREATININE-BSD FMLA CKD-EPI: >60 ML/MIN/1.73/M2
GLUCOSE SERPL-MCNC: 89 MG/DL (ref 70–110)
GLUCOSE UR QL STRIP: NEGATIVE
HCT VFR BLD AUTO: 45.4 % (ref 37–48.5)
HDLC SERPL-MCNC: 51 MG/DL (ref 40–75)
HDLC SERPL: 23.9 % (ref 20–50)
HGB BLD-MCNC: 14.3 GM/DL (ref 12–16)
HGB UR QL STRIP: NEGATIVE
KETONES UR QL STRIP: NEGATIVE
LDLC SERPL CALC-MCNC: 135.4 MG/DL (ref 63–159)
LEUKOCYTE ESTERASE UR QL STRIP: ABNORMAL
MCH RBC QN AUTO: 28.7 PG (ref 27–50)
MCHC RBC AUTO-ENTMCNC: 31.5 G/DL (ref 32–36)
MCV RBC AUTO: 91 FL (ref 82–98)
MICROSCOPIC COMMENT: NORMAL
NITRITE UR QL STRIP: NEGATIVE
NONHDLC SERPL-MCNC: 162 MG/DL
PH UR STRIP: 7 [PH]
PLATELET # BLD AUTO: 252 K/UL (ref 150–450)
PMV BLD AUTO: 12.7 FL (ref 9.2–12.9)
POTASSIUM SERPL-SCNC: 4.2 MMOL/L (ref 3.5–5.1)
PROGEST SERPL-MCNC: 7.6 NG/ML
PROT SERPL-MCNC: 7.4 GM/DL (ref 6–8.4)
PROT UR QL STRIP: NEGATIVE
RBC # BLD AUTO: 4.98 M/UL (ref 4–5.4)
RBC #/AREA URNS AUTO: 2 /HPF (ref 0–4)
SODIUM SERPL-SCNC: 136 MMOL/L (ref 136–145)
SP GR UR STRIP: <1.005
SQUAMOUS #/AREA URNS AUTO: 2 /HPF
T3FREE SERPL-MCNC: 3.1 PG/ML (ref 2.3–4.2)
T4 FREE SERPL-MCNC: 1.04 NG/DL (ref 0.71–1.51)
TRIGL SERPL-MCNC: 133 MG/DL (ref 30–150)
TSH SERPL-ACNC: 1.5 UIU/ML (ref 0.4–4)
UROBILINOGEN UR STRIP-ACNC: NEGATIVE EU/DL
VIT B12 SERPL-MCNC: 635 PG/ML (ref 210–950)
WBC # BLD AUTO: 5.25 K/UL (ref 3.9–12.7)
WBC #/AREA URNS AUTO: 3 /HPF (ref 0–5)

## 2025-04-01 LAB
ESTRONE SERPL-MCNC: 62 PG/ML
TESTOST SERPL-MCNC: 24 NG/DL (ref 8–60)